# Patient Record
Sex: MALE | Race: WHITE | NOT HISPANIC OR LATINO | Employment: OTHER | URBAN - METROPOLITAN AREA
[De-identification: names, ages, dates, MRNs, and addresses within clinical notes are randomized per-mention and may not be internally consistent; named-entity substitution may affect disease eponyms.]

---

## 2017-01-18 ENCOUNTER — APPOINTMENT (OUTPATIENT)
Dept: SLEEP MEDICINE | Facility: MEDICAL CENTER | Age: 72
End: 2017-01-18
Attending: NURSE PRACTITIONER
Payer: MEDICARE

## 2017-02-01 ENCOUNTER — APPOINTMENT (OUTPATIENT)
Dept: SLEEP MEDICINE | Facility: MEDICAL CENTER | Age: 72
End: 2017-02-01
Payer: MEDICARE

## 2017-10-23 ENCOUNTER — TELEPHONE (OUTPATIENT)
Dept: PULMONOLOGY | Facility: HOSPICE | Age: 72
End: 2017-10-23

## 2017-10-23 NOTE — TELEPHONE ENCOUNTER
Samantha the pt's wife called wanting to know if we had received something from the dentist for the pt's dental appliance.  I informed that nothing had been received yet and gave her the fax number to have info sent over.

## 2017-11-01 NOTE — TELEPHONE ENCOUNTER
Pt notified on vm that [facility] was [facility] and for them to cb.

## 2018-01-31 ENCOUNTER — OFFICE VISIT (OUTPATIENT)
Dept: URGENT CARE | Facility: PHYSICIAN GROUP | Age: 73
End: 2018-01-31
Payer: MEDICARE

## 2018-01-31 VITALS
BODY MASS INDEX: 23.46 KG/M2 | HEIGHT: 66 IN | HEART RATE: 71 BPM | WEIGHT: 146 LBS | SYSTOLIC BLOOD PRESSURE: 122 MMHG | OXYGEN SATURATION: 94 % | DIASTOLIC BLOOD PRESSURE: 78 MMHG | RESPIRATION RATE: 15 BRPM | TEMPERATURE: 99 F

## 2018-01-31 DIAGNOSIS — J22 LRTI (LOWER RESPIRATORY TRACT INFECTION): ICD-10-CM

## 2018-01-31 PROCEDURE — 99203 OFFICE O/P NEW LOW 30 MIN: CPT | Performed by: FAMILY MEDICINE

## 2018-01-31 RX ORDER — GUAIFENESIN 600 MG/1
600 TABLET, EXTENDED RELEASE ORAL PRN
COMMUNITY
End: 2022-08-15

## 2018-01-31 RX ORDER — AZITHROMYCIN 250 MG/1
TABLET, FILM COATED ORAL
Qty: 6 TAB | Refills: 0 | Status: SHIPPED | OUTPATIENT
Start: 2018-01-31 | End: 2020-09-01

## 2018-01-31 ASSESSMENT — ENCOUNTER SYMPTOMS
SHORTNESS OF BREATH: 1
COUGH: 1
WHEEZING: 1
FEVER: 0

## 2018-02-01 NOTE — PROGRESS NOTES
Subjective:     Alex Montemayor is a 72 y.o. male who presents for Cough (Chest congestion, body aches, sore thraot x 3 weeks)       Cough   This is a new problem. The current episode started 1 to 4 weeks ago. The problem has been gradually worsening. The problem occurs every few minutes. The cough is productive of sputum. Associated symptoms include shortness of breath and wheezing. Pertinent negatives include no fever, nasal congestion or postnasal drip.     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 at [facility]   • FORAMINOTOMY  12/2/2013    Performed by Aleksey Garcia M.D. at [facility]   • PB INJ,FORAMEN,L/S,1 LEVEL  10/30/2013    Performed by Demond Sullivan M.D. at [facility]   • PB INJ,FORAMEN,L/S,ADDL LEVELS  10/30/2013    Performed by Demond Sullivan M.D. at [facility]   • CAROTID ENDARTERECTOMY  7/24/2013    Performed by Collins Conroy M.D. at [facility]   • [facility]  [facility]23/2012    [facility]   • OTHER      pacemaker   • TONSILLECTOMY       Social History     Social History   • Marital status:   "    Spouse name: N/A   • Number of children: N/A   • Years of education: N/A     Occupational History   • Not on file.     Social History Main Topics   • Smoking status: Former Smoker     Packs/day: 3.00     Years: 6.00     Types: Cigarettes     Quit date: 1/1/1967   • Smokeless tobacco: Never Used   • Alcohol use 0.0 oz/week      Comment: RARE 1 PER MONTH   • Drug use: No   • Sexual activity: Not on file     Other Topics Concern   • Not on file     Social History Narrative   • No narrative on file      Family History   Problem Relation Age of Onset   • Other Mother      wegeners granulomatosis   • Other Father      parkinsons,dementia    Review of Systems   Constitutional: Negative for fever.   HENT: Negative for postnasal drip.    Respiratory: Positive for cough, shortness of breath and wheezing.      Allergies   Allergen Reactions   • Serzone [Nefazodone Hydrochloride] Anaphylaxis   • Ibuprofen Hives     Chest pains if taken long term   • Nsaids Hives     Chest pain, if taken long term   • Other Misc Rash     Adhesive tape,  3-0 vicryl, 4-0 monocryl      Objective:   /78   Pulse 71   Temp 37.2 °C (99 °F)   Resp 15   Ht 1.676 m (5' 6\")   Wt 66.2 kg (146 lb)   SpO2 94%   BMI 23.57 kg/m²   Physical Exam   Constitutional: He is oriented to person, place, and time. He appears well-developed and well-nourished. No distress.   HENT:   Head: Normocephalic and atraumatic.   Eyes: Conjunctivae and EOM are normal. Pupils are equal, round, and reactive to light.   Cardiovascular: Normal rate and regular rhythm.    No murmur heard.  Pulmonary/Chest: Effort normal. No respiratory distress. He has wheezes. He has no rales.   Abdominal: Soft. He exhibits no distension. There is no tenderness.   Neurological: He is alert and oriented to person, place, and time. He has normal reflexes. No sensory deficit.   Skin: Skin is warm and dry.   Psychiatric: He has a normal mood and affect.         Assessment/Plan: "   Assessment    1. LRTI (lower respiratory tract infection)  - azithromycin (ZITHROMAX) 250 MG Tab; Take 2 tablets by mouth on day one. Take one tablet by mouth the remaining days until gone  Dispense: 6 Tab; Refill: 0  Differential diagnosis, natural history, supportive care, and indications for immediate follow-up discussed.

## 2020-09-01 ENCOUNTER — OFFICE VISIT (OUTPATIENT)
Dept: NEUROLOGY | Facility: MEDICAL CENTER | Age: 75
End: 2020-09-01
Payer: MEDICARE

## 2020-09-01 VITALS
DIASTOLIC BLOOD PRESSURE: 60 MMHG | RESPIRATION RATE: 16 BRPM | HEART RATE: 80 BPM | WEIGHT: 142 LBS | OXYGEN SATURATION: 96 % | SYSTOLIC BLOOD PRESSURE: 96 MMHG | TEMPERATURE: 98.2 F | BODY MASS INDEX: 22.82 KG/M2 | HEIGHT: 66 IN

## 2020-09-01 DIAGNOSIS — G47.33 OBSTRUCTIVE SLEEP APNEA: ICD-10-CM

## 2020-09-01 DIAGNOSIS — R41.3 MEMORY LOSS: ICD-10-CM

## 2020-09-01 DIAGNOSIS — F41.9 ANXIETY: ICD-10-CM

## 2020-09-01 DIAGNOSIS — Z86.73 HISTORY OF STROKE: ICD-10-CM

## 2020-09-01 DIAGNOSIS — F32.A DEPRESSION, UNSPECIFIED DEPRESSION TYPE: ICD-10-CM

## 2020-09-01 DIAGNOSIS — Z87.898 HISTORY OF SEIZURE: ICD-10-CM

## 2020-09-01 PROCEDURE — 99205 OFFICE O/P NEW HI 60 MIN: CPT | Performed by: PSYCHIATRY & NEUROLOGY

## 2020-09-01 ASSESSMENT — ENCOUNTER SYMPTOMS
ABDOMINAL PAIN: 0
FALLS: 0
SHORTNESS OF BREATH: 0
WEIGHT LOSS: 0
SORE THROAT: 0
HALLUCINATIONS: 0
BRUISES/BLEEDS EASILY: 0
FEVER: 0
EYE DISCHARGE: 0

## 2020-09-01 ASSESSMENT — PATIENT HEALTH QUESTIONNAIRE - PHQ9
CLINICAL INTERPRETATION OF PHQ2 SCORE: 1
SUM OF ALL RESPONSES TO PHQ QUESTIONS 1-9: 6
5. POOR APPETITE OR OVEREATING: 0 - NOT AT ALL
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
## 2020-09-04 ENCOUNTER — TELEPHONE (OUTPATIENT)
Dept: NEUROLOGY | Facility: MEDICAL CENTER | Age: 75
End: 2020-09-04

## 2020-09-04 NOTE — TELEPHONE ENCOUNTER
After I got a fax saying no medical record at [facility] I called the pt and spoke with his wife teddy . She gave me a phone  and fax number for Augusto's PCP . So I called and spoke with assistant to send us the test results gave him our fax number. And I faxed a request to his pcp office.  The pt asked us the referrals for behavioral health and psychiatry to be moved to [facility] from [facility]. I contacted gudelia chapman and she moved the referrals to [facility] and I informed the pt . He commincated understanding.

## 2020-09-09 ENCOUNTER — DOCUMENTATION (OUTPATIENT)
Dept: NEUROLOGY | Facility: MEDICAL CENTER | Age: 75
End: 2020-09-09

## 2020-09-09 NOTE — PROGRESS NOTES
Labs received dated August 2020.    Folate, B6 12, TSH normal  Unremarkable CBC and CMP  The panel with triglycerides 54 LDL 96

## 2020-09-17 ENCOUNTER — DOCUMENTATION (OUTPATIENT)
Dept: NEUROLOGY | Facility: MEDICAL CENTER | Age: 75
End: 2020-09-17

## 2020-09-17 NOTE — PROGRESS NOTES
Records received.    MRI brain with without contrast 2001.  -Incidental vascular lesion in the left subinsular region suspicion for venous angioma  -Sinus disease    MRI brain with and without contrast dated April 2008.  -Few scattered nonspecific white matter change  -Mild sinus disease    MRI?  Brain 1991  -No cerebral aneurysm  -Nonspecific white matter change    1996 EEG interpreted by Dr. West: Normal    Neuropsych testing?  Incomplete report.   Dr. Logan, PhD/ELISA    No change to plan or impression

## 2020-10-02 ENCOUNTER — OFFICE VISIT (OUTPATIENT)
Dept: BEHAVIORAL HEALTH | Facility: CLINIC | Age: 75
End: 2020-10-02
Payer: MEDICARE

## 2020-10-02 DIAGNOSIS — G47.00 INSOMNIA, UNSPECIFIED TYPE: ICD-10-CM

## 2020-10-02 DIAGNOSIS — F41.1 GENERALIZED ANXIETY DISORDER: ICD-10-CM

## 2020-10-02 DIAGNOSIS — F33.1 MODERATE RECURRENT MAJOR DEPRESSION (HCC): ICD-10-CM

## 2020-10-02 PROCEDURE — 99213 OFFICE O/P EST LOW 20 MIN: CPT | Performed by: PSYCHIATRY & NEUROLOGY

## 2020-10-02 RX ORDER — ZOLPIDEM TARTRATE 10 MG/1
10 TABLET ORAL NIGHTLY PRN
Qty: 30 TAB | Refills: 2 | Status: SHIPPED | OUTPATIENT
Start: 2020-10-02 | End: 2020-11-01

## 2020-10-02 RX ORDER — ESCITALOPRAM OXALATE 20 MG/1
TABLET ORAL
Qty: 30 TAB | Refills: 2 | Status: SHIPPED | OUTPATIENT
Start: 2020-10-02 | End: 2020-12-02 | Stop reason: SDUPTHER

## 2020-10-02 ASSESSMENT — ANXIETY QUESTIONNAIRES
4. TROUBLE RELAXING: SEVERAL DAYS
1. FEELING NERVOUS, ANXIOUS, OR ON EDGE: SEVERAL DAYS
3. WORRYING TOO MUCH ABOUT DIFFERENT THINGS: SEVERAL DAYS
GAD7 TOTAL SCORE: 7
2. NOT BEING ABLE TO STOP OR CONTROL WORRYING: SEVERAL DAYS
5. BEING SO RESTLESS THAT IT IS HARD TO SIT STILL: SEVERAL DAYS
7. FEELING AFRAID AS IF SOMETHING AWFUL MIGHT HAPPEN: SEVERAL DAYS
6. BECOMING EASILY ANNOYED OR IRRITABLE: SEVERAL DAYS

## 2020-10-02 ASSESSMENT — PATIENT HEALTH QUESTIONNAIRE - PHQ9
5. POOR APPETITE OR OVEREATING: 0 - NOT AT ALL
CLINICAL INTERPRETATION OF PHQ2 SCORE: 5
SUM OF ALL RESPONSES TO PHQ QUESTIONS 1-9: 13

## 2020-10-02 NOTE — PROGRESS NOTES
" [facility] INITIAL PSYCHIATRIC EVALUATION    This provider informed the patient their medical records are totally confidential except for the use by other providers involved in their care, or if the patient signs a release, or to report instances of child or elder abuse, or if it is determined they are an immediate risk to harm themselves or others.    TOTAL FACE-TO-FACE TIME 60 minutes    CHIEF COMPLAINT       Having depressed mood, generalized anxiety, and neurocognitive disorder.    IDENTIFYING INFORMATION       The patient is a  75yo W male who retired in 2009 from a job as a .    HISTORY OF PRESENT ILLNESS       The patient was referred by his neurologist, Dr. 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  He has fought depression since [place].  His depressive episodes have a typical pattern of anhedonia, hypersomnia and increased appetite, markedly decreased energy, concentration, interest, and motivation, feeling worthless and hopeless, and having passive suicidal thoughts \"that people might be better off without him\", but he denies ever having an overt suicidal plan, intent, or attempt.  He had serious depressions in 1990, 1996, and 2001.         He also has had Generalized Anxiety Disorder with excessive worry and concern that he cannot get out of his mind, restlessness, irritability, difficulty relaxing, and muscle tension in his neck, shoulders, and upper back.       He has had serious medical problems including having a (L) carotid endarterectomy after a TIA, having a cardiac pacemaker, and having obstructive sleep apnea.  He was treated for seizure disorder in the past.        Today he scores a 30/30 on a mini mental status exam and has no evidence of serious cognitive impairment.  However, he at times is thinking slower and he " may have pseudodementia associated with his depressed mood.       His father  in  from Parkinson's Disease and the patient was his major caretaker.  His 38yo daughter who has bipolar disorder has been living with the patient and his wife for the last 2 years.    PSYCHIATRIC REVIEW OF SYSTEMS  denies manic symptoms, denies psychotic symptoms including AH / VH, denies OCD symptoms, denies restrictive eating or purging, denies trauma related symptoms, see HPI for depressive symptoms and see HPI for anxeity symptoms    MEDICAL REVIEW OF SYSTEMS:   Constitutional negative   Eyes negative   Ears/Nose/Mouth/Throat positive - cervical spondylosis, (L) carotid endarterectomy, sinusitis   Cardiovascular positive - cardiac pacemaker   Respiratory positive - obtsructive sleep apnea with CPAP   Gastrointestinal negative   Genitourinary negative   Muscular negative   Integumentary negative   Neurological positive - seizure disorder   Endocrine negative   Hematologic/Lymphatic negative       PAST PSYCHIATRIC HISTORY       Recurrent major Depression and Generalized Anxiety Disorder.  He denies ever having OCD, manic or hypomanic episodes.  PAST PSYCHIATRIC MEDICATIONS       Serzone, buspirone (hallucinations, sertraline, paroxetine, fluoxetine (spacy, lightheaded)  SOCIAL HISTORY       Born and raised in [place], [place] and graduated from college.  He worked for the [employer] company 35 years.  DRUGS none  ALCOHOL occasional alcohol  TOBACCO former smoker    MEDICAL HISTORY       Cervical spondylosis, sinusitis, (L) carotid endarterectomy, cardiac pacemaker, obstructive sleep apnea with CPAP, TIA, and seizure disorder with possible petit mal.  CURRENT MEDICATIONS       See Rooming section of this medical record for current medications.  ALLERGIES       Serzone, NSAIDS, ibuprofen  MENTAL STATUS EXAMINATION    There were no vitals taken for this visit.  Participation: Active verbal participation  Grooming:Neat  Orientation: Fully  Oriented  Eye contact: Good  Behavior:Calm   Mood: Depressed  Affect: Full range  Thought process: Logical  Thought content:  Within normal limits  Speech: Rate within normal limits and Volume within normal limits  Perception:  Within normal limits  Memory:  No gross evidence of memory deficits  Insight: Good  Judgment: Good  Family/couple interaction observations:   Other:  Depression Screen (PHQ-2/PHQ-9) 9/1/2020 10/2/2020   PHQ-2 Total Score 1 5   PHQ-9 Total Score 6 13       Interpretation of PHQ-9 Total Score   Score Severity   1-4 No Depression   5-9 Mild Depression   10-14 Moderate Depression   15-19 Moderately Severe Depression   20-27 Severe Depression  CURRENT RISK       Suicidal:Low       Homicidal:Not applicable       Self-Harm:Low       Relapse:Moderate       Crisis Safety Plan Reviewed No    ASSESSMENT       Having generalized anxiety and depression and he will be started on escitalopram   DIFFERENTIAL DIAGNOSES       (1) Major Depression, Recurrent, Moderate (F33.1)       (2) Generalized Anxiety Disorder (F41.1)  PLAN       Start escitalopram 10mg po daily X 1 week, then increase the dosage to 20mg po daily thereafter.       RTC to  Clinic in 2 months for 30 min follow up with this provider.        Patient was seen for 60 minutes face to face of which > 50% of appointment time was spent on counseling and coordination of care regarding the above.

## 2020-10-07 ENCOUNTER — APPOINTMENT (OUTPATIENT)
Dept: BEHAVIORAL HEALTH | Facility: CLINIC | Age: 75
End: 2020-10-07
Payer: MEDICARE

## 2020-12-02 ENCOUNTER — OFFICE VISIT (OUTPATIENT)
Dept: BEHAVIORAL HEALTH | Facility: CLINIC | Age: 75
End: 2020-12-02
Payer: MEDICARE

## 2020-12-02 DIAGNOSIS — F41.1 GENERALIZED ANXIETY DISORDER: ICD-10-CM

## 2020-12-02 DIAGNOSIS — F33.1 MODERATE RECURRENT MAJOR DEPRESSION (HCC): ICD-10-CM

## 2020-12-02 PROCEDURE — 90833 PSYTX W PT W E/M 30 MIN: CPT | Performed by: PSYCHIATRY & NEUROLOGY

## 2020-12-02 PROCEDURE — 99213 OFFICE O/P EST LOW 20 MIN: CPT | Performed by: PSYCHIATRY & NEUROLOGY

## 2020-12-02 RX ORDER — ESCITALOPRAM OXALATE 20 MG/1
20 TABLET ORAL DAILY
Qty: 90 TAB | Refills: 0 | Status: SHIPPED | OUTPATIENT
Start: 2020-12-02 | End: 2021-03-02

## 2020-12-02 NOTE — PROGRESS NOTES
"                                  [facility] PSYCHIATRIC FOLLOW-UP NOTE    This provider informed the patient their medical records are totally confidential except for the use by other providers involved in their care, or if the patient signs a release, or to report instances of child or elder abuse, or if it is determined they are an immediate risk to harm themselves or others.    TOTAL FACE-TO-FACE TIME  30 minutes    CHIEF COMPLAINT       Having depression, generalized anxiety, and neurocognitive disorder.  HISTORY OF PRESENT ILLNESS       The patient is a  76yo W male who is followed by this provider for recurrent Major Depression, Generalized Anxiety Disorder, and Pseudodementia.  He has had recurrent depressive episodes since high school with a typical pattern of anhedonia, decreased sleep and appetite, feeling worthless and hopeless, having moderate psychomotor retardation, and having passive suicidal ideation \"that people might be better off without him\".  He denies ever having an overt suicidal plan, intent, or attempt.  He has serious depressions in , , and .            He also has had Generalized Anxiety Disorder with excessive worry and concern that he cannot get out of his mind, restlessness, irritability, difficulty relaxing, and muscle tension in his neck, shoulders, and upper back.       He has had serious medical problems including having a (L) carotid endarterectomy after a TIA, having a cardiac pacemaker, and having obstructive sleep apnea.  He was treated for seizure disorder in the past.             His depression has been associated with some pseudodementia and he will think slower and have decreased recent memory.       His father  in  from Parkinson's Disease and the patient was his major caretaker.  His 40yo daughter who has bipolar disorder has been living with the patient and his wife for the last 2 years.  PSYCHOSOCIAL CHANGES SINCE PREVIOUS " CONTACT       The patient is maintaining social distancing but is very active at home and exercises by taking walking.  RESPONSE TO TREATMENT       The patient is more calm and has alleviation of anxiety on escitalopram 20mg po daily.  PAST PSYCHIATRIC MEDICATIONS       Serzone, buspirone (hallucinations), sertraline, paroxetine, fluoxetine (spacy, lightheaded)  MEDICATION SIDE EFFECTS       None    MEDICAL REVIEW OF SYSTEMS:   Constitutional negative   Eyes negative   Ears/Nose/Mouth/Throat positive - cervical spondylosis, (L) carotid endarterectomy, sinusitis   Cardiovascular positive - cardiac pacemaker   Respiratory positive - obtsructive sleep apnea with CPAP   Gastrointestinal negative   Genitourinary negative   Muscular negative   Integumentary negative   Neurological positive - seizure disorder   Endocrine negative   Hematologic/Lymphatic negative       MENTAL STATUS EVALUATION  There were no vitals taken for this visit.  Participation: Active verbal participation  Grooming:Neat  Orientation: Fully Oriented  Eye contact: Good  Behavior:Calm   Mood: Euthymic  Affect: Full range  Thought process: Logical  Thought content:  Within normal limits  Speech: Rate within normal limits and Volume within normal limits  Perception:  Within normal limits  Memory:  No gross evidence of memory deficits  Insight: Good  Judgment: Good  Family/couple interaction observations:   Other:  Depression Screen (PHQ-2/PHQ-9) 9/1/2020 10/2/2020   PHQ-2 Total Score 1 5   PHQ-9 Total Score 6 13       Interpretation of PHQ-9 Total Score   Score Severity   1-4 No Depression   5-9 Mild Depression   10-14 Moderate Depression   15-19 Moderately Severe Depression   20-27 Severe Depression  Current risk:    Suicide: Low   Homicide: Not applicable   Self-harm: Low  Relapse: Moderate  Other:   Crisis Safety Plan reviewed?No  If evidence of imminent risk is present, intervention/plan:    Medical Records/Labs/Diagnostic Tests Reviewed:  yes    Medical Records/Labs/Diagnostic Tests Ordered: no    DIAGNOSTIC IMPRESSIONS       (1) Major Depression, Recurrent, Moderate (F33.1)       (2) Generalized Anxiety Disorder (F41.1)    ASSESSMENT AND PLAN       Having alleviation of anxiety and depression on escitalopram 20mg po daily.       Continue escitalopram 20mg po daily.       RTC to  Clinic in 2 months for 30 min follow up with this provider.    30 minutes spent in psychotherapy  Topics addressed in psychotherapy include:  Discussed how depression can cause changes in cognition.  Explored external stressors and patient's support system.  Discussed how to restore a sense of meaning in his life.

## 2021-01-14 DIAGNOSIS — Z23 NEED FOR VACCINATION: ICD-10-CM

## 2021-01-22 ENCOUNTER — TELEPHONE (OUTPATIENT)
Dept: NEUROLOGY | Facility: MEDICAL CENTER | Age: 76
End: 2021-01-22

## 2021-01-22 ENCOUNTER — HOSPITAL ENCOUNTER (OUTPATIENT)
Dept: RADIOLOGY | Facility: MEDICAL CENTER | Age: 76
End: 2021-01-22
Attending: PSYCHIATRY & NEUROLOGY
Payer: MEDICARE

## 2021-01-22 DIAGNOSIS — R41.3 MEMORY LOSS: ICD-10-CM

## 2021-01-22 PROCEDURE — 70450 CT HEAD/BRAIN W/O DYE: CPT

## 2021-01-22 NOTE — TELEPHONE ENCOUNTER
----- Message from Tran Arevalo M.D. sent at 1/22/2021  1:35 PM PST -----  NO acute findings. Suspect underlying white matter disease. Will have Dipesh communicate to patient.

## 2021-01-23 NOTE — TELEPHONE ENCOUNTER
I called the pt and informed him the above. He communicated understanding. He asked the report to be mailed to him and I mailed it to his address

## 2021-03-02 ENCOUNTER — APPOINTMENT (OUTPATIENT)
Dept: BEHAVIORAL HEALTH | Facility: CLINIC | Age: 76
End: 2021-03-02
Payer: MEDICARE

## 2021-03-22 DIAGNOSIS — F51.01 PRIMARY INSOMNIA: ICD-10-CM

## 2021-03-22 RX ORDER — ZOLPIDEM TARTRATE 10 MG/1
10 TABLET ORAL NIGHTLY PRN
Qty: 30 TABLET | Refills: 2 | Status: SHIPPED | OUTPATIENT
Start: 2021-03-22 | End: 2021-04-21

## 2021-03-22 RX ORDER — ESCITALOPRAM OXALATE 20 MG/1
20 TABLET ORAL DAILY
Qty: 90 TABLET | Refills: 0 | Status: SHIPPED | OUTPATIENT
Start: 2021-03-22 | End: 2021-05-28 | Stop reason: SDUPTHER

## 2021-03-22 RX ORDER — ESCITALOPRAM OXALATE 20 MG/1
20 TABLET ORAL DAILY
Qty: 90 TABLET | Refills: 0 | Status: SHIPPED | OUTPATIENT
Start: 2021-03-22 | End: 2021-03-22 | Stop reason: SDUPTHER

## 2021-05-27 NOTE — PROGRESS NOTES
"                                  [facility] PSYCHIATRIC FOLLOW-UP NOTE    This provider informed the patient their medical records are totally confidential except for the use by other providers involved in their care, or if the patient signs a release, or to report instances of child or elder abuse, or if it is determined they are an immediate risk to harm themselves or others.  To avoid spread of covid-19 virus this appointment was conducted by telehealth.  The patient gave consent to have this follow up session by video telehealth.    Time at beginning of call:  11:00 AM    TOTAL FACE-TO-FACE TIME  30 minutes    CHIEF COMPLAINT       Having depression, generalized anxiety, and neurocognitive disorder.  HISTORY OF PRESENT ILLNESS       The patient is a  76yo W male who is followed by this provider for recurrent Major Depression, Generalized Anxiety Disorder, and Pseudodementia.  He has had recurrent depressive episodes since high school with a typical pattern of anhedonia, decreased sleep and appetite, feeling worthless and hopeless, having moderate psychomotor retardation, and having passive suicidal ideation \"that people might be better off without him\".  He denies ever having an overt suicidal plan, intent, or attempt.  He has serious depressions in 1990, 1996, and 2001.       He also has had Generalized Anxiety Disorder with excessive worry and concern that he cannot get out of his mind, restlessness, irritability, difficulty relaxing, and muscle tension in his neck, shoulders, and upper back.       He has had serious medical problems including having a (L) carotid endarterectomy after a TIA, having a cardiac pacemaker, and having obstructive sleep apnea.  He was treated for seizure disorder in the past.             His depression has been associated with some pseudodementia and he will think slower and have decreased recent memory.  However, his wife has noticed he has difficulty using " the TV remote, he has difficulty with word finding, and he loses track of directions when driving to places he has been familiar before.         His father  in  from Parkinson's Disease and the patient was his major caretaker.  His 40yo daughter who has bipolar disorder has been living with the patient and his wife for the last 2 years.       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  PSYCHOSOCIAL CHANGES SINCE PREVIOUS CONTACT       His spouse is getting frustrated and wants him to be on medications for dementing change.  RESPONSE TO TREATMENT       Having progression of his dementing change.  PAST PSYCHIATRIC MEDICATIONS       Serzone, buspirone (hallucinations), sertraline, paroxetine, fluoxetine (spacy, lightheaded)  MEDICATION SIDE EFFECTS       None    MEDICAL REVIEW OF SYSTEMS:   Constitutional negative   Eyes negative   Ears/Nose/Mouth/Throat positive - cervical spondylosis, (L) carotid endarterectomy, sinusitis   Cardiovascular positive - cardiac pacemaker   Respiratory positive - obtsructive sleep apnea with CPAP   Gastrointestinal negative   Genitourinary negative   Muscular negative   Integumentary negative   Neurological positive - seizure disorder   Endocrine negative   Hematologic/Lymphatic negative           MENTAL STATUS EVALUATION  There were no vitals taken for this visit.  Participation: Active verbal participation  Grooming:Neat  Orientation: Fully Oriented  Eye contact: Good  Behavior:Calm   Mood: Euthymic  Affect: Full range  Thought process: Logical  Thought content:  Within normal limits  Speech: Rate within normal limits and Volume within normal limits  Perception:  Within normal limits  Memory:  No gross evidence of memory deficits  Insight: Good  Judgment: Good  Family/couple interaction observations:   Other:  Depression Screen (PHQ-2/PHQ-9) 2020 10/2/2020   PHQ-2 Total Score 1 5  "  PHQ-9 Total Score 6 13       Interpretation of PHQ-9 Total Score   Score Severity   1-4 No Depression   5-9 Mild Depression   10-14 Moderate Depression   15-19 Moderately Severe Depression   20-27 Severe Depression  Current risk:    Suicide: Low   Homicide: Not applicable   Self-harm: Low  Relapse: Moderate  Other:   Crisis Safety Plan reviewed?No  If evidence of imminent risk is present, intervention/plan:    Medical Records/Labs/Diagnostic Tests Reviewed: yes    Medical Records/Labs/Diagnostic Tests Ordered: no    DIAGNOSTIC IMPRESSIONS  DIAGNOSTIC IMPRESSIONS       (1) Major Depression, Recurrent, Moderate (F33.1)       (2) Generalized Anxiety Disorder (F41.1)       (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  However, his dementing change is progressing and he has recent memory loss.  He will be started on donepezil 5mg po QHS and memantine 10mg po BID.       Continue escitalopram 20mg po daily.       Start donepezil 5mg po QHS.       Start memantine 10mg po BID.       RTC to  Clinic in 2 months for 30 min follow up with this provider.    Time at end of call:  11:30 AM    30 minutes spent in psychotherapy  Topics addressed in psychotherapy include:  His cognition and recent memory is improving as he becomes less depressed.  The patient shared his fears because of seeing his father immobilized by Parkinson\"s Disease.  He was reassured that his cognitive impaired was part of having severe depression and reversible.          "

## 2021-05-28 ENCOUNTER — TELEMEDICINE (OUTPATIENT)
Dept: BEHAVIORAL HEALTH | Facility: CLINIC | Age: 76
End: 2021-05-28
Payer: MEDICARE

## 2021-05-28 DIAGNOSIS — F41.1 GENERALIZED ANXIETY DISORDER: ICD-10-CM

## 2021-05-28 DIAGNOSIS — F33.1 MODERATE RECURRENT MAJOR DEPRESSION (HCC): ICD-10-CM

## 2021-05-28 DIAGNOSIS — F03.90: ICD-10-CM

## 2021-05-28 PROCEDURE — 90833 PSYTX W PT W E/M 30 MIN: CPT | Mod: 95,CR | Performed by: PSYCHIATRY & NEUROLOGY

## 2021-05-28 PROCEDURE — 99213 OFFICE O/P EST LOW 20 MIN: CPT | Mod: 95,CR | Performed by: PSYCHIATRY & NEUROLOGY

## 2021-05-28 RX ORDER — ESCITALOPRAM OXALATE 20 MG/1
20 TABLET ORAL DAILY
Qty: 90 TABLET | Refills: 0 | Status: SHIPPED | OUTPATIENT
Start: 2021-05-28 | End: 2021-08-26

## 2021-05-28 RX ORDER — DONEPEZIL HYDROCHLORIDE 5 MG/1
5 TABLET, FILM COATED ORAL
Qty: 90 TABLET | Refills: 0 | Status: SHIPPED | OUTPATIENT
Start: 2021-05-28 | End: 2021-06-28

## 2021-05-28 RX ORDER — MEMANTINE HYDROCHLORIDE 10 MG/1
10 TABLET ORAL 2 TIMES DAILY
Qty: 180 TABLET | Refills: 0 | Status: SHIPPED | OUTPATIENT
Start: 2021-05-28 | End: 2021-06-28

## 2021-06-28 ENCOUNTER — OFFICE VISIT (OUTPATIENT)
Dept: NEUROLOGY | Facility: MEDICAL CENTER | Age: 76
End: 2021-06-28
Attending: PSYCHIATRY & NEUROLOGY
Payer: MEDICARE

## 2021-06-28 VITALS
SYSTOLIC BLOOD PRESSURE: 120 MMHG | TEMPERATURE: 98.4 F | HEART RATE: 68 BPM | WEIGHT: 149.25 LBS | BODY MASS INDEX: 23.99 KG/M2 | RESPIRATION RATE: 20 BRPM | HEIGHT: 66 IN | DIASTOLIC BLOOD PRESSURE: 74 MMHG | OXYGEN SATURATION: 96 %

## 2021-06-28 DIAGNOSIS — R41.3 MEMORY LOSS: ICD-10-CM

## 2021-06-28 DIAGNOSIS — Z12.11 COLON CANCER SCREENING: ICD-10-CM

## 2021-06-28 DIAGNOSIS — F32.A DEPRESSION, UNSPECIFIED DEPRESSION TYPE: ICD-10-CM

## 2021-06-28 PROCEDURE — 99214 OFFICE O/P EST MOD 30 MIN: CPT | Performed by: PSYCHIATRY & NEUROLOGY

## 2021-06-28 PROCEDURE — 99202 OFFICE O/P NEW SF 15 MIN: CPT | Performed by: PSYCHIATRY & NEUROLOGY

## 2021-06-28 RX ORDER — ZOLPIDEM TARTRATE 10 MG/1
TABLET ORAL
COMMUNITY
End: 2021-09-16 | Stop reason: SDUPTHER

## 2021-06-28 RX ORDER — OMEPRAZOLE 40 MG/1
CAPSULE, DELAYED RELEASE ORAL DAILY
COMMUNITY
Start: 2021-05-20

## 2021-06-28 ASSESSMENT — ENCOUNTER SYMPTOMS: HALLUCINATIONS: 0
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
## 2021-07-22 ENCOUNTER — PRE-ADMISSION TESTING (OUTPATIENT)
Dept: ADMISSIONS | Facility: MEDICAL CENTER | Age: 76
End: 2021-07-22
Attending: INTERNAL MEDICINE
Payer: MEDICARE

## 2021-07-26 NOTE — OR NURSING
COVID-19 Pre-surgery screenin. Do you have an undiagnosed respiratory illness or symptoms such as coughing or sneezing? NO (Yes/No)  a. Onset of Sx -  b. Acute vs. chronic respiratory illness -     2. Do you have an unexplained fever greater than 100.4 degrees Fahrenheit or 38 degrees Celsius?                     NO (Yes/No)     3. Have you had direct exposure to a patient who tested positive for Covid-19?                          NO (Yes/No)     4. Have you had any loss of your sense of taste or smell? Have you had N/V or sore throat? NO     Patient has been informed of visitor policy and asked to wear a mask upon entering the hospital   YES (Yes/No)            COVID-19 Pre-surgery screening:     Pt did not answer generic voicemail was left with call back number.

## 2021-07-27 ENCOUNTER — HOSPITAL ENCOUNTER (OUTPATIENT)
Facility: MEDICAL CENTER | Age: 76
End: 2021-07-27
Attending: INTERNAL MEDICINE | Admitting: INTERNAL MEDICINE
Payer: MEDICARE

## 2021-07-27 VITALS
HEIGHT: 65 IN | DIASTOLIC BLOOD PRESSURE: 69 MMHG | OXYGEN SATURATION: 96 % | RESPIRATION RATE: 16 BRPM | BODY MASS INDEX: 24.72 KG/M2 | WEIGHT: 148.37 LBS | TEMPERATURE: 98.2 F | SYSTOLIC BLOOD PRESSURE: 129 MMHG | HEART RATE: 67 BPM

## 2021-07-27 PROCEDURE — 160048 HCHG OR STATISTICAL LEVEL 1-5: Performed by: INTERNAL MEDICINE

## 2021-07-27 PROCEDURE — 700101 HCHG RX REV CODE 250

## 2021-07-27 PROCEDURE — 160035 HCHG PACU - 1ST 60 MINS PHASE I: Performed by: INTERNAL MEDICINE

## 2021-07-27 PROCEDURE — 160200: Performed by: INTERNAL MEDICINE

## 2021-07-27 PROCEDURE — 91010 ESOPHAGUS MOTILITY STUDY: CPT | Performed by: INTERNAL MEDICINE

## 2021-07-27 RX ORDER — LIDOCAINE HYDROCHLORIDE 20 MG/ML
SOLUTION OROPHARYNGEAL
Status: DISCONTINUED
Start: 2021-07-27 | End: 2021-07-27 | Stop reason: HOSPADM

## 2021-07-27 RX ORDER — LIDOCAINE HYDROCHLORIDE 20 MG/ML
JELLY TOPICAL
Status: DISCONTINUED
Start: 2021-07-27 | End: 2021-07-27 | Stop reason: HOSPADM

## 2021-07-27 NOTE — DISCHARGE INSTRUCTIONS
Esophageal Manometry- Discharge Instructions     Esophageal Manometry  1. Resume regular diet and medications.  2. Follow up with your doctor.  3. Additional instructions:     You should call 911 if you develop problems with breathing or chest pain. If any questions arise, call your doctor. The Contact Center is open Monday through Friday 7AM to 5PM and may speak to a nurse at [phone], or toll free at [phone].    I acknowledge receipt and understanding of these Home Care Instructions.

## 2021-07-27 NOTE — PROGRESS NOTES
Patient consented for the procedure, as RN proceeded with placing the esophageal manometry probe to right nostril, patient continuously starting coughing, gaging. Waited for couple minutes to proceed but patient started vomiting and stated that his neck is hurting now. Patient stated to take the probe out now and wished not to continue with this procedure. Explained that this is normal as the beads from probe does irritate the back of his throat which causes the gagging and vomiting, but patient refused. Gave patient more water to drink and suggested to proceed with left nostril. Patient refused to proceed with this procedure. Informed Dr Ley office and spoke with Carisa () about patient refusal for this procedure. Informed patient's wife as well about this. Patient signed his discharge paperwork and left the building with his wife.

## 2021-07-27 NOTE — PROGRESS NOTES
Risk and benefits reviewed with patient including the following:  Esophageal manometry is generally safe, though complications including nose bleeds, and nose and/or throat discomfort may occur.  Acquired data may be inaccurate due to the inability to place the manometry tube in the correct position.  Potential serious risks which include irregular heartbeat, aspiration, or perforation are extremely rare.  Your physician believes these potential risks are outweighed by the benefits of this test in your case.  Patient agrees and wishes to continue with test.

## 2021-09-13 ENCOUNTER — TELEMEDICINE (OUTPATIENT)
Dept: BEHAVIORAL HEALTH | Facility: CLINIC | Age: 76
End: 2021-09-13
Payer: MEDICARE

## 2021-09-13 DIAGNOSIS — F03.90: ICD-10-CM

## 2021-09-13 DIAGNOSIS — F41.1 GENERALIZED ANXIETY DISORDER: ICD-10-CM

## 2021-09-13 DIAGNOSIS — F33.1 MODERATE RECURRENT MAJOR DEPRESSION (HCC): ICD-10-CM

## 2021-09-13 NOTE — PROGRESS NOTES
The patient could not get on to the virtual visit and will reschedule and come face-to-face for a follow up appointment.

## 2021-09-16 ENCOUNTER — OFFICE VISIT (OUTPATIENT)
Dept: BEHAVIORAL HEALTH | Facility: CLINIC | Age: 76
End: 2021-09-16
Payer: MEDICARE

## 2021-09-16 DIAGNOSIS — F41.1 GENERALIZED ANXIETY DISORDER: ICD-10-CM

## 2021-09-16 DIAGNOSIS — F33.1 MODERATE RECURRENT MAJOR DEPRESSION (HCC): ICD-10-CM

## 2021-09-16 PROCEDURE — 99213 OFFICE O/P EST LOW 20 MIN: CPT | Performed by: PSYCHIATRY & NEUROLOGY

## 2021-09-16 PROCEDURE — 90833 PSYTX W PT W E/M 30 MIN: CPT | Performed by: PSYCHIATRY & NEUROLOGY

## 2021-09-16 RX ORDER — ZOLPIDEM TARTRATE 10 MG/1
TABLET ORAL
Qty: 30 TABLET | Refills: 2 | Status: SHIPPED | OUTPATIENT
Start: 2021-09-16 | End: 2022-03-11 | Stop reason: SDUPTHER

## 2021-09-16 RX ORDER — ESCITALOPRAM OXALATE 10 MG/1
10 TABLET ORAL DAILY
Qty: 90 TABLET | Refills: 0 | Status: SHIPPED | OUTPATIENT
Start: 2021-09-16 | End: 2021-12-15

## 2021-09-16 NOTE — PROGRESS NOTES
"                                  [facility] PSYCHIATRIC FOLLOW-UP NOTE    This provider informed the patient their medical records are totally confidential except for the use by other providers involved in their care, or if the patient signs a release, or to report instances of child or elder abuse, or if it is determined they are an immediate risk to harm themselves or others.  To avoid spread of covid-19 virus this follow up session was conducted face to-face wearing masks and a face shield.    Time at beginning of session:  8:00 AM    TOTAL FACE-TO-FACE TIME  30 minutes    CHIEF COMPLAINT       Having depression, generalized anxiety, and neurocognitive disorder.  HISTORY OF PRESENT ILLNESS       The patient is a  74yo W male who is followed by this provider for recurrent Major Depression, Generalized Anxiety Disorder, and Pseudodementia.  He has had recurrent depressive episodes since high school with a typical pattern of anhedonia, decreased sleep and appetite, feeling worthless and hopeless, having moderate psychomotor retardation, and having passive suicidal ideation \"that people might be better off without him\".  He denies ever having an overt suicidal plan, intent, or attempt.  He has serious depressions in 1990, 1996, and 2001.       He also has had Generalized Anxiety Disorder with excessive worry and concern that he cannot get out of his mind, restlessness, irritability, difficulty relaxing, and muscle tension in his neck, shoulders, and upper back.       He has had serious medical problems including having a (L) carotid endarterectomy after a TIA, having a cardiac pacemaker, and having obstructive sleep apnea.  He was treated for seizure disorder in the past.             His depression has been associated with some pseudodementia and he will think slower and have decreased recent memory.  However, his wife has noticed he has difficulty using the TV remote, he has difficulty with " word finding, and he loses track of directions when driving to places he has been familiar before.         His father  in  from Parkinson's Disease and the patient was his major caretaker.  His 38yo daughter who has bipolar disorder has been living with the patient and his wife for the last 2 years.       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  He still has a problem with short term memory and he stays at home most of the time.  PSYCHOSOCIAL CHANGES SINCE PREVIOUS CONTACT       The patient has had 3 to 4 times when his vision acted unusually and his binocularity broke down and one eye looked upwards and one eye looked downwards.  These symptoms lasted only 15 seconds.  He is seeing a neurologist to evaluate these symptoms.  RESPONSE TO TREATMENT       Having alleviation of   PAST PSYCHIATRIC MEDICATIONS       Serzone, buspirone (hallucinations), sertraline, paroxetine, fluoxetine (spacy, lightheaded)  MEDICATION SIDE EFFECTS       Visual hallucinations and some disorientation from escitalopram 20mg po daily.  He therefore decreased the escitalopram dosage to 10mg po QAM and his side effects went away.    MEDICAL REVIEW OF SYSTEMS:   Constitutional negative   Eyes negative   Ears/Nose/Mouth/Throat positive - cervical spondylosis, (L) carotid endarterectomy, sinusitis   Cardiovascular positive - cardiac pacemaker   Respiratory positive - obtsructive sleep apnea with CPAP   Gastrointestinal negative   Genitourinary negative   Muscular negative   Integumentary negative   Neurological positive - seizure disorder   Endocrine negative   Hematologic/Lymphatic negative       MENTAL STATUS EVALUATION  There were no vitals taken for this visit.  Participation: Active verbal participation  Grooming:Neat  Orientation: Fully Oriented  Eye contact: Good  Behavior:Calm   Mood: Anxious  Affect: Constricted  Thought process:  Logical  Thought content:  Within normal limits  Speech: Rate within normal limits and Volume within normal limits  Perception:  Within normal limits  Memory:  No gross evidence of memory deficits  Insight: Adequate  Judgment: Adequate  Family/couple interaction observations:   Other:  BRAYAN-7 Questionnaire    Feeling nervous, anxious, or on edge:  2  Not being able to stop or control worryin  Worrying too much about different things:  2  Trouble relaxin  Being so restless that it's hard to sit still: 2   Becoming easily annoyed or irritable: 3  Feeling afraid as if something awful might happen:  3  Total:  15    Interpretation of BRAYAN 7 Total Score   Score Severity :  0-4 No Anxiety   5-9 Mild Anxiety  10-14 Moderate Anxiety  15-21 Severe Anxiety    Depression Screening    Little interest or pleasure in doing things?    3  Feeling down, depressed , or hopeless?   1  Trouble falling or staying asleep, or sleeping too much?    2  Feeling tired or having little energy?    2  Poor appetite or overeating?    2  Feeling bad about yourself - or that you are a failure or have let yourself or your family down?   1  Trouble concentrating on things, such as reading the newspaper or watching television?   2  Moving or speaking so slowly that other people could have noticed.  Or the opposite - being so fidgety or restless that you have been moving around a lot more than usual?    2  Thoughts that you would be better off dead, or of hurting yourself?    1  Patient Health Questionnaire Score:   16      If depressive symptoms identified deferred to follow up visit unless specifically addressed in assesment and plan.    Interpretation of PHQ-9 Total Score   Score Severity   1-4 No Depression   5-9 Mild Depression   10-14 Moderate Depression   15-19 Moderately Severe Depression   20-27 Severe Depression    Current risk:    Suicide: Low   Homicide: Not applicable   Self-harm: Low  Relapse: Moderate  Other:   Crisis Safety Plan  reviewed?No  If evidence of imminent risk is present, intervention/plan:    Medical Records/Labs/Diagnostic Tests Reviewed: yes    Medical Records/Labs/Diagnostic Tests Ordered: no    DIAGNOSTIC IMPRESSIONS       (1) Major Depression, Recurrent, Moderate (F33.1)       (2) Generalized Anxiety Disorder (F41.1)       (3) 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  However, his dementing change is progressing and he has recent memory loss.         Continue escitalopram 10mg po daily.       RTC to  Clinic in 2 months for 30 min follow up with this provider.    Time at end of session:  8:30 AM    Greater than 16 minutes spent in psychotherapy exclusive of my E&M.  Topics addressed in psychotherapy include:  He is afraid because he watched his father die from dementia.  He ruminates because he was his father's caretaker.  Discussed how medications for memory improve functioning.

## 2021-10-11 ENCOUNTER — TELEPHONE (OUTPATIENT)
Dept: NEUROLOGY | Facility: MEDICAL CENTER | Age: 76
End: 2021-10-11

## 2021-10-11 ENCOUNTER — OFFICE VISIT (OUTPATIENT)
Dept: NEUROLOGY | Facility: MEDICAL CENTER | Age: 76
End: 2021-10-11
Attending: PSYCHIATRY & NEUROLOGY
Payer: MEDICARE

## 2021-10-11 VITALS
HEIGHT: 65 IN | HEART RATE: 85 BPM | DIASTOLIC BLOOD PRESSURE: 84 MMHG | BODY MASS INDEX: 24.28 KG/M2 | TEMPERATURE: 97.9 F | RESPIRATION RATE: 12 BRPM | SYSTOLIC BLOOD PRESSURE: 132 MMHG | OXYGEN SATURATION: 97 % | WEIGHT: 145.72 LBS

## 2021-10-11 DIAGNOSIS — Z86.73 HISTORY OF STROKE: ICD-10-CM

## 2021-10-11 DIAGNOSIS — R41.3 MEMORY LOSS: ICD-10-CM

## 2021-10-11 PROCEDURE — 99214 OFFICE O/P EST MOD 30 MIN: CPT | Performed by: PSYCHIATRY & NEUROLOGY

## 2021-10-11 PROCEDURE — 99212 OFFICE O/P EST SF 10 MIN: CPT | Performed by: PSYCHIATRY & NEUROLOGY

## 2021-10-11 RX ORDER — KETOCONAZOLE 20 MG/ML
SHAMPOO TOPICAL
COMMUNITY
Start: 2021-10-06 | End: 2021-12-28

## 2021-10-11 RX ORDER — NYSTATIN 100000 U/G
OINTMENT TOPICAL
COMMUNITY
End: 2021-12-28

## 2021-10-11 RX ORDER — TRIAMCINOLONE ACETONIDE 1 MG/G
CREAM TOPICAL
COMMUNITY
End: 2021-12-28

## 2021-10-11 ASSESSMENT — ENCOUNTER SYMPTOMS: HALLUCINATIONS: 0

## 2021-10-11 NOTE — TELEPHONE ENCOUNTER
Called pt to request that he get a lipid panel completed as soon as possible, per Dr. Arevalo's request.     I have requested the paperwork from his last lab work/lipid profile from [facility]/Dr. Lott (cardiology). It is reportedly from 8/2020 and is to be faxed over today.

## 2021-10-11 NOTE — TELEPHONE ENCOUNTER
Called pt / LVM to let him know that Dr. Arevalo placed an order for an MRI- Brain today for Augusto. She would like him to get that imaging completed as soon as possible. Requested return call if any questions.    --------------------------------------------------------    Pt returned call saying that he is most likely ineligible for an MRI due to his pacemaker. Please advise.
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
## 2021-10-14 ENCOUNTER — HOSPITAL ENCOUNTER (OUTPATIENT)
Dept: RADIOLOGY | Facility: MEDICAL CENTER | Age: 76
End: 2021-10-14
Attending: NURSE PRACTITIONER
Payer: MEDICARE

## 2021-10-14 DIAGNOSIS — R13.14 PHARYNGOESOPHAGEAL DYSPHAGIA: ICD-10-CM

## 2021-10-14 DIAGNOSIS — R13.19 ESOPHAGEAL DYSPHAGIA: ICD-10-CM

## 2021-10-14 PROCEDURE — 92611 MOTION FLUOROSCOPY/SWALLOW: CPT

## 2021-10-14 PROCEDURE — 74230 X-RAY XM SWLNG FUNCJ C+: CPT

## 2021-10-14 NOTE — OP THERAPY EVALUATION
"[facility]  Speech-Language Pathology Department  Modified Barium Swallow Study Summary      Patient:  Augusto Montemayor      Date of Evaluation: 10/14/21    Referring Provider:  Princess RINCON   Fax: [phone]      Patient History/Reason for Referral: Pt was referred for MBS due to pharyngoesophageal dysphagia.    Patient with swallowing deficits for years.  Recently with coughing on liquids where \"goes the wrong way.\"  This is occurring about 1 time a week.  When having soft solids such as scrambled eggs, egg ad tuna salad, and mashed potatoes where will get stuck at laryngeal and sternal notch area.  Will take a couple of extra swallows or liquid wash to clear from pharynx.   Can have salad and crunchy solids with no complications.  Ahsan admit that is taking larger bites with soft solids as well as not masticating as much in comparison to the more complex solids. Stated he isn't eating the softer texture more than 1 time a week, but if he did, he would have this happen consistently.  Determined at end of evaluation he is having swallowing deficits more at night while he is eating/drinking with distraction (watching TV, talking, etc). Felt that Lexepro was causing swallowing difficulty and weaned off over the last month and has been completely off for a week with improved swallow function.  Feels he is back to 75% of his baseline.  At times can feel bolus in chest area which takes more to clear through to stomach.  Has no weight loss or vocal changes.    MOCA score 28/30 on 10/11/21.  Had EGD per report a couple of months ago with no improvements with swallowing.  Manometry also attempted, but unable to complete as there was an issue with procedure.    Past medical history not limited to: Afib, arthritis, CVA, dysphagia, seizures, heart burn, macular degeneration, depression, and KAMI       Oral Mechanism Exam:   -Dentition: natural and intact  -Labial: WFL  -Lingual: WFL  -Palatal Elevation: " WFL  -Laryngeal Elevation: mild decrease  -Cough: Strong  -Vocal Quality: Clear  - Circumlaryngeal Palpation: No edema, right sided pharyngeal pain 2/10.    Procedure Results:  The examination was conducted with videofluoroscopy from a   Lateral and Anterior view.  The following textures were presented:   Pudding, Diced Fruit, Cookie and Thin Liquid     Structural Abnormalities: Query CP Bar    The following observations were made:   (WFL unless otherwise noted)    Oral Phase Thin Liquids  Puree Mixed Solid   Lip Closure       Tongue Control During Bolus Hold X  Valleculae      Premature spillage into the valleculae X X     Premature spillage into the pyriform sinus X  X    Bolus Preparation/ Mastication    X  extended   Bolus Transport/ Lingual Motion  X  Slow     Oral Residue after swallow X  Collection  X  Collection    Initiation of Pharyngeal Swallow          Other Observations:   Only larger intake with residue and more spillage, small intake with no residue or spillage.         Pharyngeal Phase Thin Liquids  Puree Mixed Solid   Soft Palate Elevation       Laryngeal Elevation       Anterior Hyoid Excursion       Epiglottic Inversion  X X X X   Laryngeal Vestibular Closure       Pharyngeal Stripping Wave       Pharyngoesophageal Segment Opening (PES) X  Ret/retro X X  Ret/retro X   Tongue Base Retraction (BOT) and/or BOT Residue X  Collection        X   Collection     Residue in valleculae X  Collection      Residue in piriform sinus(es) X  Collection      Residue on posterior pharyngeal wall (PPW)       Penetration X      Aspiration         Other Observations:  Only larger intake with residue and penetration, small intake with no residue.      Penetration Aspiration Scale:   Score of 1: Neither penetration nor aspiration  Score of 2-5: Penetration  Score of 6-8: Aspiration    1: Material does not enter airway  2: Material enters airway, but remains above vocal folds; Ejected from airway; no stasis  3: Material  remains above vocal folds; visible stasis remains  4: Material contacts vocal folds, but is ejected; no stasis  5: Material contacts vocal folds, and is not ejected; visible stasis remains  6: Material passes glottis, but is ejected from airway; No visible subglottic stasis  7: Material passes glottis, but is not ejected from airway; visible subglottic stasis despite patient’s response  8: Material passes glottis, and is not ejected; visible subglottic stasis; Absent patient response                        Esophageal Phase: Retention and retrograde flow at UES with liquids and mixed viscosities.  Slight retention below UES with liquids.      Impressions:  Patient with penetration of thin liquids one time, only with large intake, no stasis.  No aspiration with all intake.  Small intake eliminated penetration and risks of occurrence.       Patient independently took additional swallows to clear oropharyngeal cavities.  Oropharyngeal residue varied from slight to mild with all intake, but only occurred with larger intake. Smaller intake with no deficits noted in all function. Patient with negative globus sensation at mid pharynx which I suspect was referred sensation from items at UES that were not clearing.  Soft solids with more residue than hard solids.  Educated on floating pills especially due to fluctuations with managing larger amounts of liquids needed to clear pills from oral cavity.    Mild retention and retrograde flow at UES was not observed to enter into vestibular space, but did enter into pharynx.  Query CP bar that minimally reduce flow through UES.  If strategies weren’t observed I would suspect that there would be intermittent ascending penetration/aspiration events occurring.  Suspect the retention is causing referred sensation in pharynx.    Oropharyngeal function within normal age related changes that are inconsistent.  When using strategies, he demonstrated improved tolerance of PO. Educated on  the aging brain and his current change in cognitive function and need to focus more on swallowing, no distractions, no talking, attending to bite and drink size, and increase mastication with softer items to ensure can pass pharynx to UES.         Recommendations:   Diet: Regular (IDDSI L 7)    Liquid: Thin (IDDSI L 0)          Medications:   Whole or floated    Compensatory Strategies:   siting up at 90 degrees, small bites/drinks, no distractions, avoid problematic foods, and no talking.        Your patient may benefit from a referral for:       1. Consider further assessment from GI as needed for UES dysfunction.        Thanks you for the referral.    For further questions, please call [phone].       Urmila Diaz M.S., CCC-SLP

## 2021-11-01 ENCOUNTER — TELEPHONE (OUTPATIENT)
Dept: NEUROLOGY | Facility: MEDICAL CENTER | Age: 76
End: 2021-11-01

## 2021-11-01 NOTE — TELEPHONE ENCOUNTER
"Returned call to Samantha Sim regarding health of , Alex. She says that he has recently(last week) had \"severe memory lapses and worsening headaches\" and \"worsening mood and personality changes\". Her concern is primarily about the recent progressive decline she is seeing in Alex. She would like to speak with Dr. Arevalo when convenient. Given numbers were  or .   "

## 2021-11-12 NOTE — TELEPHONE ENCOUNTER
Pt's wife called again today asking to schedule an appointment with Dr. Arevalo. Sent message to [employer] asking her to schedule them asap.

## 2021-12-28 ENCOUNTER — OFFICE VISIT (OUTPATIENT)
Dept: NEUROLOGY | Facility: MEDICAL CENTER | Age: 76
End: 2021-12-28
Attending: PSYCHIATRY & NEUROLOGY
Payer: MEDICARE

## 2021-12-28 VITALS
DIASTOLIC BLOOD PRESSURE: 80 MMHG | BODY MASS INDEX: 23.82 KG/M2 | SYSTOLIC BLOOD PRESSURE: 132 MMHG | HEART RATE: 74 BPM | TEMPERATURE: 98.7 F | WEIGHT: 143 LBS | HEIGHT: 65 IN | OXYGEN SATURATION: 98 %

## 2021-12-28 DIAGNOSIS — R41.3 MEMORY LOSS: ICD-10-CM

## 2021-12-28 PROBLEM — N45.1 EPIDIDYMITIS: Status: ACTIVE | Noted: 2021-10-14

## 2021-12-28 PROCEDURE — 99214 OFFICE O/P EST MOD 30 MIN: CPT | Performed by: PSYCHIATRY & NEUROLOGY

## 2021-12-28 RX ORDER — DOXYCYCLINE HYCLATE 100 MG/1
CAPSULE ORAL
COMMUNITY
Start: 2021-10-14 | End: 2021-12-28
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
## 2022-01-13 ENCOUNTER — HOSPITAL ENCOUNTER (OUTPATIENT)
Dept: RADIOLOGY | Facility: MEDICAL CENTER | Age: 77
End: 2022-01-13
Attending: PSYCHIATRY & NEUROLOGY
Payer: MEDICARE

## 2022-01-13 DIAGNOSIS — R41.3 MEMORY LOSS: ICD-10-CM

## 2022-01-13 PROCEDURE — A9584 IODINE I-123 IOFLUPANE: HCPCS

## 2022-01-17 ENCOUNTER — TELEPHONE (OUTPATIENT)
Dept: NEUROLOGY | Facility: MEDICAL CENTER | Age: 77
End: 2022-01-17

## 2022-01-17 NOTE — TELEPHONE ENCOUNTER
MAYELA for patient with information to [facility]. Per Dr. Arevalo, patient was having trouble logging back into [employer] account.     Phone number provided: [phone] for [employer] assistance.

## 2022-02-08 ENCOUNTER — OFFICE VISIT (OUTPATIENT)
Dept: NEUROLOGY | Facility: MEDICAL CENTER | Age: 77
End: 2022-02-08
Attending: PSYCHIATRY & NEUROLOGY
Payer: MEDICARE

## 2022-02-08 VITALS
BODY MASS INDEX: 23.84 KG/M2 | WEIGHT: 143.08 LBS | DIASTOLIC BLOOD PRESSURE: 58 MMHG | TEMPERATURE: 97.5 F | OXYGEN SATURATION: 96 % | HEIGHT: 65 IN | HEART RATE: 72 BPM | SYSTOLIC BLOOD PRESSURE: 118 MMHG

## 2022-02-08 DIAGNOSIS — G31.83 LEWY BODY DEMENTIA WITH BEHAVIORAL DISTURBANCE (HCC): ICD-10-CM

## 2022-02-08 DIAGNOSIS — F02.818 LEWY BODY DEMENTIA WITH BEHAVIORAL DISTURBANCE (HCC): ICD-10-CM

## 2022-02-08 PROCEDURE — 99214 OFFICE O/P EST MOD 30 MIN: CPT | Performed by: PSYCHIATRY & NEUROLOGY

## 2022-02-08 PROCEDURE — 99212 OFFICE O/P EST SF 10 MIN: CPT | Performed by: PSYCHIATRY & NEUROLOGY
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
## 2022-03-11 DIAGNOSIS — F33.1 MODERATE RECURRENT MAJOR DEPRESSION (HCC): ICD-10-CM

## 2022-03-11 RX ORDER — ZOLPIDEM TARTRATE 10 MG/1
TABLET ORAL
Qty: 30 TABLET | Refills: 2 | Status: SHIPPED | OUTPATIENT
Start: 2022-03-11 | End: 2022-03-24 | Stop reason: SDUPTHER

## 2022-03-11 NOTE — TELEPHONE ENCOUNTER
Upcoming appt. 03/24    Received request via: Pharmacy    Was the patient seen in the last year in this department? No    Does the patient have an active prescription (recently filled or refills available) for medication(s) requested? No

## 2022-03-24 ENCOUNTER — TELEMEDICINE (OUTPATIENT)
Dept: BEHAVIORAL HEALTH | Facility: CLINIC | Age: 77
End: 2022-03-24
Payer: MEDICARE

## 2022-03-24 DIAGNOSIS — F33.1 MODERATE RECURRENT MAJOR DEPRESSION (HCC): ICD-10-CM

## 2022-03-24 DIAGNOSIS — F02.80 DEMENTIA DUE TO PARKINSON'S DISEASE WITHOUT BEHAVIORAL DISTURBANCE (HCC): ICD-10-CM

## 2022-03-24 DIAGNOSIS — G20.A1 DEMENTIA DUE TO PARKINSON'S DISEASE WITHOUT BEHAVIORAL DISTURBANCE (HCC): ICD-10-CM

## 2022-03-24 DIAGNOSIS — F41.1 GENERALIZED ANXIETY DISORDER: ICD-10-CM

## 2022-03-24 PROCEDURE — 90833 PSYTX W PT W E/M 30 MIN: CPT | Performed by: PSYCHIATRY & NEUROLOGY

## 2022-03-24 PROCEDURE — 99214 OFFICE O/P EST MOD 30 MIN: CPT | Performed by: PSYCHIATRY & NEUROLOGY

## 2022-03-24 RX ORDER — ESCITALOPRAM OXALATE 5 MG/1
5 TABLET ORAL DAILY
Qty: 90 TABLET | Refills: 0 | Status: SHIPPED | OUTPATIENT
Start: 2022-03-24 | End: 2022-06-09

## 2022-03-24 RX ORDER — ZOLPIDEM TARTRATE 10 MG/1
TABLET ORAL
Qty: 30 TABLET | Refills: 2 | Status: SHIPPED | OUTPATIENT
Start: 2022-03-24 | End: 2022-04-23

## 2022-03-24 NOTE — PROGRESS NOTES
"                                  [facility] PSYCHIATRIC FOLLOW-UP NOTE    This provider informed the patient their medical records are totally confidential except for the use by other providers involved in their care, or if the patient signs a release, or to report instances of child or elder abuse, or if it is determined they are an immediate risk to harm themselves or others.  To avoid spread of covid-19 virus this follow up appointment was conducted face-to-face wearing masks and a face shield.    Time at beginning of session:  9:30 AM    TOTAL FACE-TO-FACE TIME  30 minutes    CHIEF COMPLAINT       Having depression, generalized anxiety, and neurocognitive disorder.  HISTORY OF PRESENT ILLNESS       The patient is a  76yo W male who is followed by this provider for recurrent Major Depression, Generalized Anxiety Disorder, and Pseudodementia.  He has had recurrent depressive episodes since high school with a typical pattern of anhedonia, decreased sleep and appetite, feeling worthless and hopeless, having moderate psychomotor retardation, and having passive suicidal ideation \"that people might be better off without him\".  He denies ever having an overt suicidal plan, intent, or attempt.  He has serious depressions in 1990, 1996, and 2001.       He also has had Generalized Anxiety Disorder with excessive worry and concern that he cannot get out of his mind, restlessness, irritability, difficulty relaxing, and muscle tension in his neck, shoulders, and upper back.       He has had serious medical problems including having a (L) carotid endarterectomy after a TIA, having a cardiac pacemaker, and having obstructive sleep apnea.  He was treated for seizure disorder in the past.             His depression has been associated with some pseudodementia and he will think slower and have decreased recent memory.  However, his wife has noticed he has difficulty using the TV remote, he has difficulty with " "word finding, and he loses track of directions when driving to places he has been familiar before.         His father  in  from Parkinson's Disease and the patient was his major caretaker.  His 38yo daughter who has bipolar disorder has been living with the patient and his wife for the last 2 years.       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  He still has a problem with short term memory and he stays at home most of the time.  With medical illnesses including carotid endarterectomy, KAMI, cardiac pacemaker, and seizures, and he had a ERIC scan which is similar to a PET scan which showed abnormalities in the caudate and putamen and striatum which were consistent with Parkinson\"s  dementing change.  His wife has never seen him with \"sundowning\" and recent imaging is consistent with Parkinson's.  PSYCHOSOCIAL CHANGES SINCE PREVIOUS CONTACT       The patient has been diagnosed with Parkinson's Disease.  RESPONSE TO TREATMENT       Having partial alleviation of depression and anxiety by escitalopram 10mg po daily.  PAST PSYCHIATRIC MEDICATIONS       Serzone, buspirone (hallucinations), sertraline, paroxetine, fluoxetine (spacy, lightheaded)  MEDICATION SIDE EFFECTS       The patient believes he's a slow metabolizer and a little bit of medication goes a long way.    MEDICAL REVIEW OF SYSTEMS:   Constitutional negative   Eyes negative   Ears/Nose/Mouth/Throat positive - cervical spondylosis, (L) carotid endarterectomy, sinusitis   Cardiovascular positive - cardiac pacemaker   Respiratory positive - obtsructive sleep apnea with CPAP   Gastrointestinal negative   Genitourinary negative   Muscular negative   Integumentary negative   Neurological positive - seizure disorder   Endocrine negative   Hematologic/Lymphatic negative       MENTAL STATUS EVALUATION  There were no vitals taken for this visit.  Participation: Active " verbal participation  Grooming:Neat  Orientation: Fully Oriented  Eye contact: Good  Behavior:Calm   Mood: Depressed  Affect: Constricted  Thought process: Logical  Thought content:  Within normal limits  Speech: Rate within normal limits and Volume within normal limits  Perception:  Within normal limits  Memory:  Poor memory for chronology of events  Insight: Good  Judgment: Good  Family/couple interaction observations:   Other:  BRAYAN-7 Questionnaire    Feeling nervous, anxious, or on edge:  0  Not being able to stop or control worryin  Worrying too much about different things:  2  Trouble relaxin  Being so restless that it's hard to sit still:  0  Becoming easily annoyed or irritable:  0  Feeling afraid as if something awful might happen:  0  Total:  6    Interpretation of BRAYAN 7 Total Score   Score Severity :  0-4 No Anxiety   5-9 Mild Anxiety  10-14 Moderate Anxiety  15-21 Severe Anxiety    Depression Screening    Little interest or pleasure in doing things?    2  Feeling down, depressed , or hopeless?   1  Trouble falling or staying asleep, or sleeping too much?   1   Feeling tired or having little energy?    1  Poor appetite or overeating?    1  Feeling bad about yourself - or that you are a failure or have let yourself or your family down?  2   Trouble concentrating on things, such as reading the newspaper or watching television?  2   Moving or speaking so slowly that other people could have noticed.  Or the opposite - being so fidgety or restless that you have been moving around a lot more than usual?    2  Thoughts that you would be better off dead, or of hurting yourself?    1  Patient Health Questionnaire Score:   13      If depressive symptoms identified deferred to follow up visit unless specifically addressed in assesment and plan.    Interpretation of PHQ-9 Total Score   Score Severity   1-4 No Depression   5-9 Mild Depression   10-14 Moderate Depression   15-19 Moderately Severe Depression    20-27 Severe Depression    Current risk:    Suicide: Low   Homicide: Not applicable   Self-harm: Low  Relapse: Moderate  Other:   Crisis Safety Plan reviewed?No  If evidence of imminent risk is present, intervention/plan:    Medical Records/Labs/Diagnostic Tests Reviewed: yes    Medical Records/Labs/Diagnostic Tests Ordered: no    DIAGNOSTIC IMPRESSIONS       (1) Major Depression, Recurrent, Moderate (F33.1)       (2) Generalized Anxiety Disorder (F41.1)       (3)  Dementing Change from Parkinson's Disease or Lewy Body Dementia (F01.50)    ASSESSMENT AND PLAN       Stable on current medications and dosages.       Continue escitalopram 10mg po daily.       Continue zolpidem 5mg po QHS as needed for sleep.       RTC to  Clinic in 3 months for 30 min follow up with this provider.    Time at end of session:  10:00 AM    Greater than 16 minutes spent in psychotherapy exclusive of my E&M.  Topics addressed in psychotherapy include:  The patient's cognitive problems wax and wane during the day or day-to-day.  He is afraid of developing Parkinson's Disease as a cause of his cognitive decline.

## 2022-05-18 DIAGNOSIS — F33.1 MODERATE RECURRENT MAJOR DEPRESSION (HCC): ICD-10-CM

## 2022-05-19 RX ORDER — ZOLPIDEM TARTRATE 10 MG/1
5 TABLET ORAL NIGHTLY PRN
Qty: 15 TABLET | Refills: 2 | Status: SHIPPED | OUTPATIENT
Start: 2022-05-19 | End: 2022-06-18

## 2022-06-09 RX ORDER — ESCITALOPRAM OXALATE 5 MG/1
TABLET ORAL
Qty: 90 TABLET | Refills: 0 | Status: SHIPPED | OUTPATIENT
Start: 2022-06-09 | End: 2022-10-03 | Stop reason: SDUPTHER

## 2022-08-15 ENCOUNTER — OFFICE VISIT (OUTPATIENT)
Dept: NEUROLOGY | Facility: MEDICAL CENTER | Age: 77
End: 2022-08-15
Attending: PSYCHIATRY & NEUROLOGY
Payer: MEDICARE

## 2022-08-15 VITALS
DIASTOLIC BLOOD PRESSURE: 58 MMHG | OXYGEN SATURATION: 97 % | HEART RATE: 67 BPM | RESPIRATION RATE: 16 BRPM | WEIGHT: 139.33 LBS | TEMPERATURE: 98.1 F | SYSTOLIC BLOOD PRESSURE: 114 MMHG | BODY MASS INDEX: 23.21 KG/M2 | HEIGHT: 65 IN

## 2022-08-15 DIAGNOSIS — F03.90 DEMENTIA WITHOUT BEHAVIORAL DISTURBANCE, UNSPECIFIED DEMENTIA TYPE: ICD-10-CM

## 2022-08-15 DIAGNOSIS — F32.A DEPRESSION, UNSPECIFIED DEPRESSION TYPE: ICD-10-CM

## 2022-08-15 PROCEDURE — 99212 OFFICE O/P EST SF 10 MIN: CPT | Performed by: PSYCHIATRY & NEUROLOGY

## 2022-08-15 PROCEDURE — 99214 OFFICE O/P EST MOD 30 MIN: CPT | Performed by: PSYCHIATRY & NEUROLOGY

## 2022-08-15 RX ORDER — FAMOTIDINE 40 MG/1
40 TABLET, FILM COATED ORAL NIGHTLY
COMMUNITY
Start: 2022-08-10

## 2022-08-15 RX ORDER — ZOLPIDEM TARTRATE 10 MG/1
TABLET ORAL
COMMUNITY
Start: 2022-08-01 | End: 2022-10-03 | Stop reason: SDUPTHER
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
## 2022-10-03 DIAGNOSIS — F51.01 PRIMARY INSOMNIA: ICD-10-CM

## 2022-10-03 NOTE — TELEPHONE ENCOUNTER
Upcoming appt. 10/12  Received request via: Patient    Was the patient seen in the last year in this department? Yes    Does the patient have an active prescription (recently filled or refills available) for medication(s) requested? No

## 2022-10-04 RX ORDER — ZOLPIDEM TARTRATE 10 MG/1
TABLET ORAL
Qty: 30 TABLET | Refills: 2 | Status: SHIPPED | OUTPATIENT
Start: 2022-10-04 | End: 2022-10-12 | Stop reason: SDUPTHER

## 2022-10-04 RX ORDER — ESCITALOPRAM OXALATE 5 MG/1
5 TABLET ORAL DAILY
Qty: 90 TABLET | Refills: 0 | Status: SHIPPED | OUTPATIENT
Start: 2022-10-04 | End: 2022-10-12 | Stop reason: SDUPTHER

## 2022-10-12 ENCOUNTER — OFFICE VISIT (OUTPATIENT)
Dept: BEHAVIORAL HEALTH | Facility: CLINIC | Age: 77
End: 2022-10-12
Payer: MEDICARE

## 2022-10-12 DIAGNOSIS — G31.83 MILD LEWY BODY DEMENTIA WITH PSYCHOTIC DISTURBANCE (HCC): ICD-10-CM

## 2022-10-12 DIAGNOSIS — F41.1 GENERALIZED ANXIETY DISORDER: ICD-10-CM

## 2022-10-12 DIAGNOSIS — F33.1 MODERATE RECURRENT MAJOR DEPRESSION (HCC): ICD-10-CM

## 2022-10-12 DIAGNOSIS — F02.A2 MILD LEWY BODY DEMENTIA WITH PSYCHOTIC DISTURBANCE (HCC): ICD-10-CM

## 2022-10-12 DIAGNOSIS — F51.01 PRIMARY INSOMNIA: ICD-10-CM

## 2022-10-12 PROCEDURE — 99214 OFFICE O/P EST MOD 30 MIN: CPT | Performed by: PSYCHIATRY & NEUROLOGY

## 2022-10-12 PROCEDURE — 90833 PSYTX W PT W E/M 30 MIN: CPT | Performed by: PSYCHIATRY & NEUROLOGY

## 2022-10-12 RX ORDER — ESCITALOPRAM OXALATE 5 MG/1
5 TABLET ORAL DAILY
Qty: 90 TABLET | Refills: 0 | Status: SHIPPED | OUTPATIENT
Start: 2022-10-12 | End: 2023-01-10

## 2022-10-12 RX ORDER — ZOLPIDEM TARTRATE 10 MG/1
TABLET ORAL
Qty: 30 TABLET | Refills: 2 | Status: SHIPPED | OUTPATIENT
Start: 2022-10-12 | End: 2022-11-12

## 2022-10-12 NOTE — PROGRESS NOTES
"                                  [facility] PSYCHI[facility] FOLLOW-UP NOTE    This provider informed the patient their medical records are totally confidential except for the use by other providers involved in their care, or if the patient signs a release, or to report instances of child or elder abuse, or if it is determined they are an immediate risk to harm themselves or others.  To avoid spread of covid-19 virus this follow up appointment was conducted face-to-face wearing masks and a face shield.     Time at end of session:  4:30 PM    TOTAL FACE-TO-FACE TIME  30 minutes    CHIEF COMPLAINT       Having depression, generalized anxiety, and neurocognitive disorder.  HISTORY OF PRESENT ILLNESS       The patient is a  76yo W male who is followed by this provider for recurrent Major Depression, Generalized Anxiety Disorder, and Pseudodementia.  He has had recurrent depressive episodes since high school with a typical pattern of anhedonia, decreased sleep and appetite, feeling worthless and hopeless, having moderate psychomotor retardation, and having passive suicidal ideation \"that people might be better off without him\".  He denies ever having an overt suicidal plan, intent, or attempt.  He has serious depressions in 1990, 1996, and 2001.       He also has had Generalized Anxiety Disorder with excessive worry and concern that he cannot get out of his mind, restlessness, irritability, difficulty relaxing, and muscle tension in his neck, shoulders, and upper back.       He has had serious medical problems including having a (L) carotid endarterectomy after a TIA, having a cardiac pacemaker, and having obstructive sleep apnea.  He was treated for seizure disorder in the past.             His depression has been associated with some pseudodementia and he will think slower and have decreased recent memory.  However, his wife has noticed he has difficulty using the TV remote, he has difficulty with word " "finding, and he loses track of directions when driving to places he has been familiar before.         His father  in  from Parkinson's Disease and the patient was his major caretaker.  His 40yo daughter who has bipolar disorder has been living with the patient and his wife for the last 2 years.       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  He still has a problem with short term memory and he stays at home most of the time.  With medical illnesses including carotid endarterectomy, KAMI, cardiac pacemaker, and seizures, and he had a ERIC scan which is similar to a PET scan which showed abnormalities in the caudate and putamen and striatum which were consistent with Parkinson\"s  dementing change.  His wife has never seen him with \"sundowning\" and recent imaging is consistent with Parkinson's.          PSYCHOSOCIAL CHANGES SINCE PREVIOUS CONTACT       ***  RESPONSE TO TREATMENT       ***  PAST PSYCHIATRIC MEDICATIONS       Serzone, buspirone (hallucinations), sertraline, paroxetine, fluoxetine (spacy, lightheaded)  MEDICATION SIDE EFFECTS       ***    MEDICAL REVIEW OF SYSTEMS:   Constitutional negative   Eyes negative   Ears/Nose/Mouth/Throat positive - cervical spondylosis, (L) carotid endarterectomy, sinusitis   Cardiovascular positive - cardiac pacemaker   Respiratory positive - obtsructive sleep apnea with CPAP   Gastrointestinal negative   Genitourinary negative   Muscular negative   Integumentary negative   Neurological positive - seizure disorder   Endocrine negative   Hematologic/Lymphatic negative          MENTAL STATUS EVALUATION  There were no vitals taken for this visit.  Participation: {[facility] PARTICIPATION MEASURES:[id number]}  Grooming:{[facility] GRO[facility]}  Orientation: {[facility] ORIENTATION:82406158}  Eye contact: {[facility] EYE CONTACT:26086182}  Behavior:{[facility] BEHAVIOR:15313904}   Mood: {RB " MOOD:89075537}  Affect: {[facility] AFFECT:76134929}  Thought process: {[facility] THOUGHT PROCESS:02283463}  Thought content:  {[facility] THOUGHT CONTENT:75521589}  Speech: {[facility] SPEECH:05883001}  Perception:  {[facility] PERCEPTION:93470543}  Memory:  {[facility] MEMORY:64631802}  Insight: {GOOD/ADEQUATE/LIMITED/POOR:90122309}  Judgment: {GOOD/ADEQUATE/LIMITED/POOR:81487486}  Family/couple interaction observations:   Other:    Current risk:    Suicide: Low   Homicide: Not applicable   Self-harm: Low  Relapse: Moderate  Other:   Crisis Safety Plan reviewed?No  If evidence of imminent risk is present, intervention/plan:    Medical Records/Labs/Diagnostic Tests Reviewed: yes    Medical Records/Labs/Diagnostic Tests Ordered: no    DIAGNOSTIC IMPRESSIONS       (1) Major Depression, Recurrent, Moderate (F33.1)       (2) Generalized Anxiety Disorder (F41.1)       (3)  Dementing Change from Parkinson's Disease or Lewy Body Dementia (F01.50)     ASSESSMENT AND PLAN       Stable on current medications and dosages.       Continue escitalopram 10mg po daily.       Continue zolpidem 5mg po QHS as needed for sleep.       RTC to  Clinic in 3 months for 30 min follow up with this provider.    Time at end of session:  5:00 PM    Greater than 16 minutes spent in psychotherapy exclusive of my E&M.  Topics addressed in psychotherapy include:  He has imaging consistent with Parkinson's and he will die of that disease.  He also has problems with vision, hearing, and memory.

## 2022-10-12 NOTE — PROGRESS NOTES
"                                  [facility] PSYCHIATRIC FOLLOW-UP NOTE    This provider informed the patient their medical records are totally confidential except for the use by other providers involved in their care, or if the patient signs a release, or to report instances of child or elder abuse, or if it is determined they are an immediate risk to harm themselves or others.  To avoid spread of covid-19 virus this follow up appointment was conducted face-to-face wearing masks and a face shield.     Time at beginning of call:  4:30 PM    TOTAL FACE-TO-FACE TIME  30 minutes    CHIEF COMPLAINT       Having depression, generalized anxiety, and neurocognitive disorder.  HISTORY OF PRESENT ILLNESS       The patient is a  76yo W male who is followed by this provider for recurrent Major Depression, Generalized Anxiety Disorder, and Pseudodementia.  He has had recurrent depressive episodes since high school with a typical pattern of anhedonia, decreased sleep and appetite, feeling worthless and hopeless, having moderate psychomotor retardation, and having passive suicidal ideation \"that people might be better off without him\".  He denies ever having an overt suicidal plan, intent, or attempt.  He has serious depressions in 1990, 1996, and 2001.       He also has had Generalized Anxiety Disorder with excessive worry and concern that he cannot get out of his mind, restlessness, irritability, difficulty relaxing, and muscle tension in his neck, shoulders, and upper back.       He has had serious medical problems including having a (L) carotid endarterectomy after a TIA, having a cardiac pacemaker, and having obstructive sleep apnea.  He was treated for seizure disorder in the past.             His depression has been associated with some pseudodementia and he will think slower and have decreased recent memory.  However, his wife has noticed he has difficulty using the TV remote, he has difficulty with " "word finding, and he loses track of directions when driving to places he has been familiar before.         His father  in  from Parkinson's Disease and the patient was his major caretaker.  His 38yo daughter who has bipolar disorder has been living with the patient and his wife for the last 2 years.       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  He still has a problem with short term memory and he stays at home most of the time.  With medical illnesses including carotid endarterectomy, KAMI, cardiac pacemaker, and seizures, and he had a ERIC scan which is similar to a PET scan which showed abnormalities in the caudate and putamen and striatum which were consistent with Parkinson\"s  dementing change.  His wife has never seen him with \"sundowning\" and recent imaging is consistent with Parkinson's or Lewy Body Dementia with visual and auditory hallucinations.  He also has eye hand coordianation.  PSYCHOSOCIAL CHANGES SINCE PREVIOUS CONTACT       Having a festenating gait and some difficulty with motor function.  He had a TURP on .  RESPONSE TO TREATMENT       Less dark thoughts on escitalopram 5mg po daily.  PAST PSYCHIATRIC MEDICATIONS       Serzone, buspirone (hallucinations), sertraline, paroxetine, fluoxetine (spacy, lightheaded)  MEDICATION SIDE EFFECTS       Gets pruritis on escitalopram at 10mg po daily.    MEDICAL REVIEW OF SYSTEMS:   Constitutional negative   Eyes negative   Ears/Nose/Mouth/Throat positive - cervical spondylosis, (L) carotid endarterectomy, sinusitis   Cardiovascular positive - cardiac pacemaker   Respiratory positive - obtsructive sleep apnea with CPAP   Gastrointestinal negative   Genitourinary negative   Muscular negative   Integumentary negative   Neurological positive - seizure disorder   Endocrine negative   Hematologic/Lymphatic negative        MENTAL STATUS EVALUATION  There " were no vitals taken for this visit.  Participation: Active verbal participation  Grooming:Neat  Orientation: Fully Oriented  Eye contact: Good  Behavior:Calm   Mood: Depressed  Affect: Blunted  Thought process: Logical  Thought content:  Within normal limits  Speech: Rate within normal limits and Volume within normal limits  Perception:  Within normal limits  Memory:  No gross evidence of memory deficits  Insight: Good  Judgment: Good  Family/couple interaction observations:   Other:  Depression Screening    Little interest or pleasure in doing things?    3  Feeling down, depressed , or hopeless?   2  Trouble falling or staying asleep, or sleeping too much?    1  Feeling tired or having little energy?    1  Poor appetite or overeating?    0  Feeling bad about yourself - or that you are a failure or have let yourself or your family down?   2  Trouble concentrating on things, such as reading the newspaper or watching television?   2  Moving or speaking so slowly that other people could have noticed.  Or the opposite - being so fidgety or restless that you have been moving around a lot more than usual?    2  Thoughts that you would be better off dead, or of hurting yourself?    2  Patient Health Questionnaire Score:   15      If depressive symptoms identified deferred to follow up visit unless specifically addressed in assesment and plan.    Interpretation of PHQ-9 Total Score   Score Severity   1-4 No Depression   5-9 Mild Depression   10-14 Moderate Depression   15-19 Moderately Severe Depression   20-27 Severe Depression   BRAYAN-7 Questionnaire    Feeling nervous, anxious, or on edge:  2  Not being able to stop or control worryin  Worrying too much about different things:  1  Trouble relaxin  Being so restless that it's hard to sit still:  1  Becoming easily annoyed or irritable:  0  Feeling afraid as if something awful might happen:  2  Total:  9    Interpretation of BRAYAN 7 Total Score   Score Severity  ":  0-4 No Anxiety   5-9 Mild Anxiety  10-14 Moderate Anxiety  15-21 Severe Anxiety   .MMSE    -What is the:  Year, season, date, day, month.               5/5 points    -Where are we:   State, county, town, hospital, floor.      5/5 points    -3 objects immediate recall.                                             3/3 points    -World backwards or serial sevens.                                 5/5 points    -Three-minute recall.                                                         2/3 points    -Name 2 objects.                                                               2/2 points    -Repeat the following.      \"No ifs and is or buts\"                                                    1/1 point    -Follow a 3 stage command.      Paper right hand, fold in half, place on floor.                 3/3 points    -Follow a written command.       Close your eyes                                                          1/1 point    -Write a complete sentence.                                           1/1 point    -Copy a design.                                                                0/1 point    Total                                                                                  28/30    Cutoff of 24 indicating dementia.   Current risk:    Suicide: Low   Homicide: Not applicable   Self-harm: Low  Relapse: Moderate  Other:   Crisis Safety Plan reviewed?No  If evidence of imminent risk is present, intervention/plan:    Medical Records/Labs/Diagnostic Tests Reviewed: yes    Medical Records/Labs/Diagnostic Tests Ordered: no    DIAGNOSTIC IMPRESSIONS       (1) Major Depression, Recurrent, Moderate (F33.1)       (2) Generalized Anxiety Disorder (F41.1)       (3)  Dementing Change from Parkinson's Disease or Lewy Body Dementia (F01.50)     ASSESSMENT AND PLAN       Stable on current medications and dosages.       Continue escitalopram 5mg po daily.       Continue zolpidem 5mg po QHS as needed for sleep.       RTC to "  Clinic in 3 months for 30 min follow up with this provider.    Time at end of call:  5:00 PM    Greater than 16 minutes spent in psychotherapy exclusive of my E&M.  Topics addressed in psychotherapy include:  He has imaging consistent with Parkinson's and is afraid of dying of that disease.  He also has difficulty with hearing, vision, and memory.

## 2022-11-02 ENCOUNTER — PATIENT MESSAGE (OUTPATIENT)
Dept: HEALTH INFORMATION MANAGEMENT | Facility: OTHER | Age: 77
End: 2022-11-02

## 2022-11-30 ENCOUNTER — TELEPHONE (OUTPATIENT)
Dept: NEUROLOGY | Facility: MEDICAL CENTER | Age: 77
End: 2022-11-30

## 2022-11-30 NOTE — TELEPHONE ENCOUNTER
Patient's wife called saying patient is having some concerning changes. He has been very unstable on his feet. He was vacuuming and felt like his body was forcing him to fall backwards and slid down the wall. He has been having halations and saying things that are not right.She is even wondering if he's having seizures.    She is not sure what to do he can't get in with a new provider until possibly April. He has appointment next week with a new pcp as the other one left.    Please advise    Thanks

## 2022-11-30 NOTE — TELEPHONE ENCOUNTER
I spoke with wife and  today -this seems like a progression of suspected Lewy body.  There is postural instability.  We discussed obtaining an EEG given his history of seizures.  I offered them an appointment in December which they would like to attend given this changes in August.  Currently there is no evidence of fever, abrupt decline.  I will reach out to Bridgette for scheduling.

## 2022-12-12 ENCOUNTER — TELEPHONE (OUTPATIENT)
Dept: NEUROLOGY | Facility: MEDICAL CENTER | Age: 77
End: 2022-12-12
Payer: MEDICARE

## 2022-12-19 ENCOUNTER — OFFICE VISIT (OUTPATIENT)
Dept: NEUROLOGY | Facility: MEDICAL CENTER | Age: 77
End: 2022-12-19
Attending: PSYCHIATRY & NEUROLOGY
Payer: MEDICARE

## 2022-12-19 VITALS
HEART RATE: 68 BPM | SYSTOLIC BLOOD PRESSURE: 112 MMHG | DIASTOLIC BLOOD PRESSURE: 66 MMHG | BODY MASS INDEX: 23.85 KG/M2 | TEMPERATURE: 97.7 F | OXYGEN SATURATION: 98 % | WEIGHT: 143.3 LBS

## 2022-12-19 DIAGNOSIS — G31.83 LEWY BODY DEMENTIA WITH BEHAVIORAL DISTURBANCE (HCC): ICD-10-CM

## 2022-12-19 DIAGNOSIS — F02.818 LEWY BODY DEMENTIA WITH BEHAVIORAL DISTURBANCE (HCC): ICD-10-CM

## 2022-12-19 PROCEDURE — 99214 OFFICE O/P EST MOD 30 MIN: CPT | Performed by: PSYCHIATRY & NEUROLOGY

## 2022-12-19 PROCEDURE — 99212 OFFICE O/P EST SF 10 MIN: CPT | Performed by: PSYCHIATRY & NEUROLOGY

## 2022-12-19 RX ORDER — DONEPEZIL HYDROCHLORIDE 10 MG/1
TABLET, FILM COATED ORAL
Qty: 90 TABLET | Refills: 2 | Status: SHIPPED | OUTPATIENT
Start: 2022-12-19 | End: 2023-11-20

## 2022-12-19 RX ORDER — ZOLPIDEM TARTRATE 5 MG/1
5 TABLET ORAL NIGHTLY PRN
COMMUNITY
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
## 2023-01-12 ENCOUNTER — APPOINTMENT (OUTPATIENT)
Dept: BEHAVIORAL HEALTH | Facility: CLINIC | Age: 78
End: 2023-01-12
Payer: MEDICARE

## 2023-04-25 ENCOUNTER — SPEECH THERAPY (OUTPATIENT)
Dept: SPEECH THERAPY | Facility: OTHER | Age: 78
End: 2023-04-25
Payer: MEDICARE

## 2023-04-25 DIAGNOSIS — R49.0 HYPOKINETIC PARKINSONIAN DYSPHONIA (HCC): ICD-10-CM

## 2023-04-25 DIAGNOSIS — G20.A1 HYPOKINETIC PARKINSONIAN DYSPHONIA (HCC): ICD-10-CM

## 2023-04-25 PROCEDURE — 31579 LARYNGOSCOPY TELESCOPIC: CPT | Performed by: SPEECH-LANGUAGE PATHOLOGIST

## 2023-04-25 NOTE — OP THERAPY EVALUATION
Outpatient Speech Therapy  INITIAL EVALUATION    [facility] SPEECH PATHOLOGY AND AUDIOLOGY  [address] [place] [address]  Phone:  [phone]  Fax:  [phone]    Date of Evaluation: 04/25/2023    Patient: Augusto Montemayor Jr.  YOB: 1945  MRN: [id number]     Referring Provider: STEFAN Singh   Referring Diagnosis G20     Time Calculation    Start time: 1310  Stop time: 1400 Time Calculation (min): 50 minutes           Chief Complaint: Voice Evaluation (Parkinson's Disease)    Visit Diagnoses     ICD-10-CM   1. Hypokinetic Parkinsonian dysphonia (HCC)  G20    R49.0     Subjective Evaluation  Past Medical History:   Diagnosis Date    Anesthesia     became combative with LAST surgery; unable to urinate and ended up  in the hospital    Arrhythmia     Ventricular Tachycardia    Arthritis     Bronchitis     Cataract 2018    Removed bilat    GERD (gastroesophageal reflux disease)     Heart burn     Indigestion     episodic    Major neurocognitive disorder probably due to vascular disease, without behavioral disturbance (HCC) 5/28/2021    KAMI (obstructive sleep apnea)     Uses a dental device    Osteoporosis     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    Pain     lumbar spine down L leg/buttock; 5/10    Palpitations 2/21/2012    Psychiatric problem     Depression    Seizure (HCC)     last seizure 05/1980    Stroke ([facility]) [address] [id number]    possible TIA, blind in one eye less than a minute duration    Syncope and collapse 2/21/2012    hypersensitive R carotid artery; why pt has a pacermaker    Trouble swallowing 07/22/2021    Unspecified hemorrhagic conditions     brusies easily, fragile skin, bleeds easily     Past Surgical History:   Procedure Laterality Date    LUMBAR DECOMPRESSION  12/2/2013    Performed by Aleksey Garcia M.D. at SURGERY [facility]    FORAMINOTOMY  12/2/2013    Performed by Aleksey Garcia M.D. at SURGERY [facility]    [place] NJX AA&/STRD TFRML EPI  LUMBAR/SACRAL 1 LEVEL  10/30/2013    Performed by Demond Sullivan M.D. at SURGERY [facility]    [place] NJX AA&/STRD TFRML EPI LUMBAR/SACRAL EA ADDL  10/30/2013    Performed by Demond Sullivan M.D. at SURGERY [facility]    CAROTID ENDARTERECTOMY  7/24/2013    Performed by Collins Conroy M.D. at SURGERY [facility]S    RECOVERY  2/23/2012    Performed by SURGERY, CATH-RECOVERY at SURGERY SAME DAY [facility]    OTHER      pacemaker    TONSILLECTOMY         Speech Therapy Objective      Assessments  Background:  Mr. Montemayor comes in with his wife and both serve as informants.  Mr. Montemayor was diagnosed with Lewy body dementia about 15 months ago and he developed PD symptoms about six months ago.  He complains that his voice is too soft to be audible in some situations, and by the end of the day, his voice is like a whisper. Both Mr. Montemayor and his wife admit to hearing impairment which exacerbates the dysphonia problem.  Mr. Monteamyor has hearing aids but does not wear them.  His wife is in the process of hearing aid assessment. They see Dr. Chevalier for ENT care.  Speech articulation was within normal limits. He complains of mild tremor but none was observed today. He is being treated for GERD and he complains of minor dysphagia.    Evaluation:  Vocal quality today was weak and breathy.  Total VHI= 66:  Functional=  26; Physiological= 20; and Emotional= 20.  This suggests that he perceives a severe handicap from dysphonia.  During a sustained vowel at 109 Hz, the RAP= 2.1%, and shimmer= 7.45%.  Total pitch range was greater than two octaves from a basal of 85 Hz to a high of 400 Hz.  The average fundamental frequency in connected speech was 105 Hz.  Dyspnea Index was zero.  Aerodynamic assessment revealed a mean airflow rate of  210 cc/second at a comfortable pitch and loudness.  MPT was 8.82 seconds with a mean airflow rate of 130 cc/second.  Maximum phonation volume was 1.18 liters.   A rigid endoscope was  introduced orally and the true vocal cords were studied by video stroboscopy.  Anatomically, the larynx and vocal cords appeared normal but there was thick mucus.  During phonation, there was asymmetry and aperiodicity of vibration. Excessive compliance in both vocal cords with the right greater than the left. Open phase dominated the cycle. Range, speed, and symmetry of vocal cord abduction and adduction were within normal limits.    Speech Therapy Plan :   Prognosis & Recommendations  Impression Summary:  Mr. Montemayor has a weak, breathy voice of about six months duration that is consistent with a diagnosis of PD. Fundamental frequency range and habitual pitch were within normal limits.  Laryngeal air flow was mildly high in habitual voice but within normal limits for maximum phonation time measurement. Expiratory volume was low but may have reflected lack of maximum effort.  Vocal acoustic measures were high. We offered PD voice training in the summer voice therapy session.  They would like to be contacted when that happens but did not commit.      Prognosis:  Good  Goals  Short Term Goals:  STG1:  Establish vocal hygiene program.  STG2:  Initiate Speak Out Training  Short Term Goal Duration (Weeks):  6-8 weeks  Patient progression on Short Term Goals:  LTG1:  Monitor and maintain Speak Out training.  Long Term Goal Duration (Weeks):  2-4 months  Patient Stated Goal:  Improve vocal loudness.  Therapy Recommendations  Recommendation:  Individual Speech Therapy,[id number]  Frequency:  1x week    Functional Assessment Used       Referring provider co-signature:  I have reviewed this plan of care and my co-signature certifies the need for services.    Certification Period: 04/25/2023 to  08/04/2023    Physician Signature: ________________________________ Date: ______________

## 2023-05-03 ENCOUNTER — TELEPHONE (OUTPATIENT)
Dept: NEUROLOGY | Facility: MEDICAL CENTER | Age: 78
End: 2023-05-03
Payer: MEDICARE

## 2023-05-15 ENCOUNTER — OFFICE VISIT (OUTPATIENT)
Dept: NEUROLOGY | Facility: MEDICAL CENTER | Age: 78
End: 2023-05-15
Attending: PSYCHIATRY & NEUROLOGY
Payer: MEDICARE

## 2023-05-15 VITALS
TEMPERATURE: 98.4 F | WEIGHT: 139.11 LBS | BODY MASS INDEX: 23.18 KG/M2 | HEIGHT: 65 IN | DIASTOLIC BLOOD PRESSURE: 62 MMHG | SYSTOLIC BLOOD PRESSURE: 124 MMHG | HEART RATE: 70 BPM | OXYGEN SATURATION: 97 %

## 2023-05-15 DIAGNOSIS — G31.9 DEGENERATIVE DISEASE OF NERVOUS SYSTEM, UNSPECIFIED (HCC): ICD-10-CM

## 2023-05-15 DIAGNOSIS — G20.C PARKINSONISM, UNSPECIFIED PARKINSONISM TYPE (HCC): ICD-10-CM

## 2023-05-15 DIAGNOSIS — F03.A0 MILD NEURODEGENERATIVE DEMENTIA (HCC): ICD-10-CM

## 2023-05-15 PROCEDURE — 3074F SYST BP LT 130 MM HG: CPT | Performed by: PSYCHIATRY & NEUROLOGY

## 2023-05-15 PROCEDURE — 99215 OFFICE O/P EST HI 40 MIN: CPT | Performed by: PSYCHIATRY & NEUROLOGY

## 2023-05-15 PROCEDURE — 99212 OFFICE O/P EST SF 10 MIN: CPT | Performed by: PSYCHIATRY & NEUROLOGY

## 2023-05-15 PROCEDURE — 3078F DIAST BP <80 MM HG: CPT | Performed by: PSYCHIATRY & NEUROLOGY

## 2023-05-15 RX ORDER — ERYTHROMYCIN 5 MG/G
OINTMENT OPHTHALMIC
COMMUNITY
End: 2023-11-20

## 2023-05-15 RX ORDER — OMEPRAZOLE 20 MG/1
20 CAPSULE, DELAYED RELEASE ORAL DAILY
COMMUNITY

## 2023-05-15 RX ORDER — FLUOXETINE 10 MG/1
20 CAPSULE ORAL DAILY
COMMUNITY
Start: 2023-03-09

## 2023-05-15 ASSESSMENT — MONTREAL COGNITIVE ASSESSMENT (MOCA)
3. DRAW A CLOCK: CONTOUR, NUMBERS, HANDS: 3/3
6. READ LIST OF DIGITS [FORWARD/BACKWARD]: 2/2
8. SERIAL SUBTRACTION OF 7S: 4 OR 5/5
DELAYED RECALL SUBSCORE: 3/5
4. NAME EACH OF THE THREE ANIMALS SHOWN: 3/3
10. [FLUENCY] NAME WORDS STARTING WITH DESIGNATED LETTER: 1/1
ORIENTATION SUBSCORE: 6/6
5. MEMORY TRIALS: SECOND TRIAL
9. REPEAT EACH SENTENCE: 2/2
WHAT IS THE VERSION OF MOCA ADMINISTERED: 8.1
WHAT IS THE TOTAL SCORE (OUT OF 30): 26
7. [VIGILENCE] TAP WHEN HEARING DESIGNATED LETTER: 1/1
2. COPY DRAWING: 0/1
11. FOR EACH PAIR OF WORDS, WHAT CATEGORY DO THEY BELONG TO (OUT OF 2): 2/2
1. ALTERNATING TRAIL MAKING: 0/1

## 2023-05-15 NOTE — PROGRESS NOTES
"Neuro note:    Followed by Dr. Tran Arevalo for 2 year or so for Dementia issue.    Last seen by Tran in 2022 at [facility].    He is here with Samantha and worked as a capable  - retired in .    About 3 years or so go when he started to have problems with his remote control (like checking the DVR)- he would hit the wrong buttons and then frustrated. There was also some \"short term memory loss\"> wife would talk about going somewhere and he would forget the conversation with 5 minutes and with slight forgetfulness over the last 2-3 years or so.    For more than 3-4 days of the week he will repeat himself within 5 to 10 minutes for well over 2 years or so.    The wife  has noticed he has problems with dates and times for over 2 years.    2 or 3 times a week he may see a formed person or animal or \"is something moving throughout the yard\"    The wife does  not endorse any notable cognitive/behavioral fluctuations in the last 12 months.    He has for the last 5-6 years or so wife has noticed he body his kick or jump which can last 1-2 hours and he can infrequently snore and he uses a dental device and he tried a CPAP machine (which drove him insane due to the mask).    Voice has clearly gotten softer in the last 6-12 months.    He has  had no falls or near falls in the last 6 months.    He has not had any dysphagia in the last 3 to 6 months.    He denies lack or loss of appetite in the recent months.    He  used to have a left hand rest tremor and then moved to the right hand and handwriting has gotten smaller.    ERIC Scan done about 2 to 3 years and was abnormal.    Family Hx: Parkinson's Disease- in mid 50's and  at age 78.            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    Pacemaker [employer] placed 2/23/12 02/24/2012    Syncope and collapse 02/21/2012    Palpitations 02/21/2012       Past medical history:   Past Medical History:   Diagnosis Date    Anesthesia     became combative with LAST surgery; unable to urinate and ended up  in the hospital    Arrhythmia     Ventricular Tachycardia    Arthritis     Bronchitis     Cataract 2018    Removed bilat    GERD (gastroesophageal reflux disease)     Heart burn     Indigestion     episodic    Major neurocognitive disorder probably due to vascular disease, without behavioral disturbance ([facility]) 5/28/2021    KAMI (obstructive sleep apnea)     Uses a dental device    Osteoporosis     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    Pain     lumbar spine down L leg/buttock; 5/10    Palpitations 2/21/2012    Psychiatric problem     Depression    Seizure ([facility])     last seizure 05/1980    Stroke ([facility]) [address] [id number]    possible TIA, blind in one eye less than a minute duration    Syncope and collapse 2/21/2012    hypersensitive R carotid artery; why pt has a pacermaker    Trouble swallowing 07/22/2021    Unspecified hemorrhagic conditions     brusies easily, fragile skin, bleeds easily       Past surgical history:   Past Surgical History:   Procedure Laterality Date    LUMBAR DECOMPRESSION  12/2/2013    Performed by Aleksey Garcia M.D. at SURGERY [facility] ORS    FORAMINOTOMY  12/2/2013    Performed by Aleksey Garcia M.D. at [facility]    [place] NJX AA&/STRD TFRML EPI LUMBAR/SACRAL 1 LEVEL  10/30/2013    Performed by Demond Sullivan M.D. at SURGERY [facility]S    [place] NJX AA&/STRD TFRML EPI LUMBAR/SACRAL EA ADDL   10/30/2013    Performed by Demond Sullivan M.D. at SURGERY [facility]    CAROTID ENDARTERECTOMY  2013    [facility] Collins Conroy [facility]    RECOVERY  2012    Performed by SURGERY CATH-RECOVERY at SURGERY SAME DAY [facility] ORS    OTHER      pacemaker    TONSILLECTOMY           Social history:   Social History     Socioeconomic History    Marital status:      Spouse name: Not on file    Number of children: Not on file    Years of education: Not on file    Highest education level: Not on file   Occupational History    Not on file   Tobacco Use    Smoking status: Former     Packs/day: 3.00     Years: 6.00     Pack years: 18.00     Types: Cigarettes     Quit date: 1967     Years since quittin.4    Smokeless tobacco: Never   Vaping Use    Vaping Use: Never used   Substance and Sexual Activity    Alcohol use: Yes     Alcohol/week: 0.0 oz     Comment: RARE 1 every 6 months    Drug use: No    Sexual activity: Not on file   Other Topics Concern    Not on file   Social History Narrative    Not on file     Social Determinants of Health     Financial Resource Strain: Not on file   Food Insecurity: Not on file   Transportation Needs: Not on file   Physical Activity: Not on file   Stress: Not on file   Social Connections: Not on file   Intimate Partner Violence: Not on file   Housing Stability: Not on file       Family history:   Family History   Problem Relation Age of Onset    Other Mother         wegeners granulomatosis    Psychiatric Illness Mother     Other Father         parkinsons,dementia    Psychiatric Illness Sister          Current medications:   Current Outpatient Medications   Medication    FLUoxetine (PROZAC) 10 MG Cap    omeprazole (PRILOSEC) 20 MG delayed-release capsule    zolpidem (AMBIEN) 5 MG Tab    donepezil (ARICEPT) 10 MG tablet    famotidine (PEPCID) 40 MG Tab    Multiple Vitamin (MULTIVITAMIN PO)    rosuvastatin (CRESTOR) 5 MG Tab    docusate  "sodium (COLACE) 100 MG CAPS    Probiotic Product (PRO-BIOTIC BLEND PO)    Lutein 20 MG CAPS    erythromycin 5 MG/GM Ointment    omeprazole (PRILOSEC) 40 MG delayed-release capsule     No current facility-administered medications for this visit.       Medication Allergy:  Allergies   Allergen Reactions    Serzone [Nefazodone Hydrochloride] Anaphylaxis    Ibuprofen Hives     Chest pains if taken long term    Nsaids Hives     Chest pain, if taken long term    Lexapro      Pt reports double vision and confusion    Other Misc Rash     Adhesive tape,  3-0 vicryl, 4-0 monocryl. Paper tape is ok for short-periods.           Physical examination:   Vitals:    05/15/23 0951   BP: 124/62   Pulse: 70   Temp: 36.9 °C (98.4 °F)   TempSrc: Temporal   SpO2: 97%   Weight: 63.1 kg (139 lb 1.8 oz)   Height: 1.651 m (5' 5\")       Normal cephalic atraumatic.  There is full range of movement around the neck in all directions without restrictions or discrete pain evoked triggers.  No lower extremity edema.      Neurological  Exam:      Matthew Cognitive Assessment (MOCA) Version 7.1    Years of Education: 4 years college    TOTAL SCORE: 26/30  (to be scanned into the MEDIA section in the E.M.R.)          Mental status: Awake, alert and fully oriented to person, place, time, and situation. Normal attention, concentration, and fund of knowledge for education level.  Did not appear/act combative,irritable,anxious,paranoid/delusional or aggressive to or with me.    Speech and language: Speech is fluent without errors, clear, intact to repetition, and intact to naming.     Follows 3 step motor commands in sequence without significant delay and correctly.    Cranial nerve exam:  II: Pupils are equally round and reactive to light. Visual fields are intact by confrontation.  III, IV, VI: EOMI, no diplopia, no ptosis.  V: Sensation to light touch is normal over V1-3 distributions bilaterally.  .  VII: Facial movements are symmetrical. There is no " facial droop. .  VIII: Hearing intact to soft speech and finger rub bilaterally  IX: Palate elevates symmetrically, uvula is midline. Dysarthria is not present.  XI: Shoulder shrug are symmetrical and strong.   XII: Tongue protrudes midline.      Motor exam:  Muscle tone is normal in all 4 limbs.    Left vs Right hand is mildly slow when opening and closing hand vs left hand.    Left vs right foot (reduced speed in tapping).    Left leg vs right is slower.        Muscle strength:    Neck Flexors/Extensors: 5/5       Right  Left  Deltoid   5/5  5/5      Biceps   5/5  5/5  Triceps              5/5  5/5   Wrist extensors 5/5  5/5  Wrist flexors  5/5  5/5     5/5  5/5  Interossei  5/5  5/5  Thenar (APB)  5/5  5/5   Hip flexors  5/5  5/5  Quadriceps  5/5  5/5    Hamstrings  5/5  5/5  Dorsiflexors  5/5  5/5  Plantarflexors  5/5  5/5  Toe extension  5/5  5/5            Reflexes:       Right  Left  Biceps   2/4  2/4  Triceps               2/4  2/4  Brachioradialis  2/4  2/4  Knee jerk  2/4  2/4  Ankle jerk  2/4  2/4     Frontal release signs are absent    bilaterally toes are downgoing to plantar stimulation..    Coordination (finger-to-nose, heel/knee/shin, rapid alternating movements) was normal.     There was no ataxia, no tremors, and no dysmetria.     Station and gait were normal. Easily stands up from exam chair without retropulsion,veering,leaning,swaying (to either side).       Labs and Tests:     NEUROIMAGIN/2022:    HISTORY/REASON FOR EXAM: Atypical Parkinson's versus essential tremor  Bilateral hand tremors and memory loss.     TECHNIQUE/EXAM DESCRIPTION AND NUMBER OF VIEWS:  DaTscan? Ioflupane I123        COMPARISON: None.     PROCEDURE:     Oral administration of Lugol solution to block thyroid uptake.     Under standardized conditions, 3.96 MCI of Ioflupane I123 was injected IV.     3 hour after the injection, SPECT imaging of the head were obtained.     FINDINGS:  There is asymmetric slightly  reduced uptake in the bilateral caudates, left more than right.     Significantly reduced/near absent uptake in the bilateral putamens        IMPRESSION:        Abnormal pattern of uptake in the striata is suggestive of a Parkinsonian type syndrome.              Exam Ended: 01/13/22  4:41 PM Last Resulted: 01/13/22  5:03 PM             Head CT    1/22/2021 1:24 PM     HISTORY/REASON FOR EXAM:  History of fall with loss of consciousness and memory loss.        TECHNIQUE/EXAM DESCRIPTION AND NUMBER OF VIEWS:  CT of the head without contrast.     Contiguous 5 mm axial sections were obtained from the skull base through the vertex.     Up to date radiation dose reduction adjustments have been utilized to meet ALARA standards for radiation dose reduction.     COMPARISON:  None available     FINDINGS:     There is no evidence of extra-axial hemorrhage. No intra-axial hemorrhage is noted. There is no brain swelling or edema. No mass effect or midline shift is noted.  There is decreased attenuation in the periventricular white matter.     IMPRESSION:        NO ACUTE ABNORMALITIES ARE NOTED ON CT SCAN OF THE HEAD.     Decreased attenuation in the cerebral white matter likely indicates microvascular ischemic disease.           Exam Ended: 01/22/21  1:24 PM Last Resulted: 01/22/21              Impression/Plans/Recommendations:    Parkinsonism- onset 3 years ago with associated cognitive/memory disturbance.    Differential includes Parkinson's Dz vs Lewy Body.    The clinical issues that support Lewy Body Disease including very mild visual hallucination.    There has also been REM Sleep Behavioral symptoms.    However he has no history of cognitive fluctuations per wife in the last 3-6 months.    2. Early stage of a  Dementia- related to #1> based on information provided by wife and verified on the Global Deterioration Score today (12 today per wife)> wife gave him 3 (2's) today.    Global Deterioration Score today in the 3 to  "range.    MOCA of 26 with clear difficulties on \"trails\", mild difficulties with cube copying and some spatial issues on clock drawing.    We discussed the consideration of Dozepezil (10 mg a day) vs other cognitive medications like Namenda and he is in agreement to not use them at this time or point.    Plans:    A. Brain PET Scan to be arranged to determine with a higher degree of certainty where Augusto is on the parkinsonian spectrum    B. Driving simulator testing to be arranged.    C. He is going PT due to stooping.    D. We discussed the importance of exercise over time.    E. Will hold off on Dopamine medications at this time- we reviewed Sinemet today.    I have performed  a history and physical exam and a directed /focused  ROS today.    Total time spent today or this patient's care was  60 and included reviewing  the diagnostic workup to date (such as labs and imaging as well as interpreting such tests relevant to this patient's neurological condition),  reviewing/obtaining separately obtained history (from patient and/or accompanying ffamily member)  for today's neurological problem(s) ,counseling and educating the patient and family member on issues related to cognition/memory and cognitive health factors and documenting  the clinical information in the EMR.    Follow up in 6 months or so.        Calvin Mckeon MD  [employer].   [facility]    "

## 2023-06-06 ENCOUNTER — HOSPITAL ENCOUNTER (OUTPATIENT)
Dept: RADIOLOGY | Facility: MEDICAL CENTER | Age: 78
End: 2023-06-06
Attending: PSYCHIATRY & NEUROLOGY
Payer: MEDICARE

## 2023-06-06 DIAGNOSIS — F03.A0 MILD NEURODEGENERATIVE DEMENTIA (HCC): ICD-10-CM

## 2023-06-06 DIAGNOSIS — G31.9 DEGENERATIVE DISEASE OF NERVOUS SYSTEM, UNSPECIFIED (HCC): ICD-10-CM

## 2023-06-06 PROCEDURE — A9552 F18 FDG: HCPCS

## 2023-06-07 ENCOUNTER — TELEPHONE (OUTPATIENT)
Dept: NEUROLOGY | Facility: MEDICAL CENTER | Age: 78
End: 2023-06-07
Payer: MEDICARE

## 2023-06-07 NOTE — TELEPHONE ENCOUNTER
Calvin Mckeon M.D.  Kendra Fine, Med Ass't    Kendra       Please send the  LETTER that I just wrote to Augusto in the [place] Mail.     He is not using MY CHART.     Tino garber    Letter mailed today

## 2023-06-08 ENCOUNTER — TELEPHONE (OUTPATIENT)
Dept: NEUROLOGY | Facility: MEDICAL CENTER | Age: 78
End: 2023-06-08
Payer: MEDICARE

## 2023-06-08 NOTE — TELEPHONE ENCOUNTER
[phone]  or [phone]  Samantha wife called states she would like to speak with dr Mckeon regarding the PET scan results. She states they received the letter today however she has questions.  Please advise

## 2023-06-14 ENCOUNTER — SPEECH THERAPY (OUTPATIENT)
Dept: SPEECH THERAPY | Facility: OTHER | Age: 78
End: 2023-06-14
Payer: MEDICARE

## 2023-06-14 DIAGNOSIS — G20.A1 HYPOKINETIC PARKINSONIAN DYSPHONIA (HCC): ICD-10-CM

## 2023-06-14 DIAGNOSIS — R49.0 HYPOKINETIC PARKINSONIAN DYSPHONIA (HCC): ICD-10-CM

## 2023-06-14 PROCEDURE — 99999 PR NO CHARGE: CPT | Performed by: SPEECH-LANGUAGE PATHOLOGIST

## 2023-06-16 NOTE — OP THERAPY DAILY TREATMENT
Outpatient Speech Therapy  DAILY TREATMENT     [facility] SPEECH PATHOLOGY AND AUDIOLOGY  [address] [place] [id number]  Phone:  [phone]  Fax:  [phone]    Date: 06/14/2023    Patient: Augusto Montemayor Jr.  YOB: 1945  MRN: [id number]     Time Calculation    Start time: 1230  Stop time: 1325 Time Calculation (min): 55 minutes         Chief Complaint: No chief complaint on file.    Visit #: 2    Subjective Evaluation    Mr. Montemayor arrived on time accompanied by his wife. Mr. Montemayor often needed redirection throughout the session. He told jokes to the clinicians in between and during baseline measurements. Mr. Montemayor had a positive attitude throughout the session and when asked why he was in speech therapy, Mr. Montemayor reported that his family has a difficult time hearing him.    Speech Therapy Objective  Sustained 'ah'  Average: 10.6 sec  Average: 60.6 dB  Lowest to highest pitch   Average: 406.2 Hz  Highest to lowest pitch  Average: 132.8 Hz  Word reading   Average: 50 dB       Assessments    Mr. Montemayor has a diagnosis of Lewy body dementia and Parkinson's disease. Mr. Montemayor presents with mild, hypokinetic dysarthria characterized by low volume, weak breathy voice, and variable rates.    Mr. Montemayor sustained 'ah', for as long as possible, at a comfortable pitch for an average of 10.6 seconds which is WNL. Mr. Montemayor's average intensity on this task was 60.6 dB. The expected intensity on this task for Mr. Montemayor is between 70-80 dB. Mr. Montemayor is ready to work on increasing his intensity through directed practice.    Mr. Montemayor's average highest pitch was 406.2 Hz and and his average lowest pitch was 132.8. This demonstrates a fundamental frequency range that is within normal limits.     Mr. Montemayor read a list of words at an average of 50 dB. Mr. Montemayor read at a fast rate that reduced his comprehensibility to approximately 50%.    Speech Therapy Plan    Mr. Montemayor will be seen again on 6/21/23.    will continue practice the intensive respiratory program.     LTG: Mr. Montemayor will demonstrate carryover from therapy to his home environment as reported by his wife.     STG1: Mr. Montemayor will increase his average loudness on sustained 'ah' to 75 dB in two consecutive sessions.     STG2: Mr. Montemayor will increase his average loudness on sentences to 75 dB in two consecutive sessions.    Visit #: 2/40      [x] As the licensed therapist supervising this student, I was present during the entire treatment session directing the care and reviewing the assessment plan.  I reviewed all documentation prior to signing.    (Optional) The following changes or alternations were made by the licensed therapist.

## 2023-06-21 ENCOUNTER — SPEECH THERAPY (OUTPATIENT)
Dept: SPEECH THERAPY | Facility: OTHER | Age: 78
End: 2023-06-21
Payer: MEDICARE

## 2023-06-21 DIAGNOSIS — R49.0 HYPOKINETIC PARKINSONIAN DYSPHONIA (HCC): ICD-10-CM

## 2023-06-21 DIAGNOSIS — G20.A1 HYPOKINETIC PARKINSONIAN DYSPHONIA (HCC): ICD-10-CM

## 2023-06-21 PROCEDURE — 99999 PR NO CHARGE: CPT | Performed by: SPEECH-LANGUAGE PATHOLOGIST

## 2023-06-23 NOTE — OP THERAPY DAILY TREATMENT
"  Outpatient Speech Therapy  DAILY TREATMENT     [facility] SPEECH PATHOLOGY AND AUDIOLOGY  [address] [place] [address]  Phone:  [phone]  Fax:  [phone]    Date: 06/21/2023    Patient: Augusto Montemayor Jr.  YOB: 1945  MRN: [id number]     Time Calculation    Start time: 1230  Stop time: 1325 Time Calculation (min): 55 minutes         Chief Complaint: Speech Therapy    Visit #: 3    Subjective Evaluation  Mr. Montemayor was seen for speech therapy services today. His wife accompanied him to the session. He regularly required redirection to stimulus after telling jokes and became distracted easily. However, redirection was successful following clinician prompts. Mr. Montemayor appeared to have a positive attitude and reported that he had completed his homework.     Speech Therapy Objective   1. Sustained \"ah\"   a. Average: 14.7 sec  b. Average: 70 dB  2. Lowest to Highest Pitch  a. Average:456.9 Hz  3. Highest to Lowest Pitch   a. Average: 115 Hz  3. Word Reading  a. Average: 58 dB    Assessments    Mr. Montemayor presents with Parkinson's disease and Lewy body dementia. 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     Mr. Montemayor sustained \"ah\"  for as long as possible at a comfortable pitch for an average of 14.7 seconds, demonstrating WNL. This is an increase from last session (10.6 seconds to 14.7 seconds). His average intensity for this phonation task was 70 dB, which is WNL since the expected intensity for this task is between 70-80 dB. This is also an increase from last session (60.6 dB to 70 dB). Clinician modeling of the task with a timer and the verbal cue \"Think loud!\" assisted with higher intensity, duration, and force. Reminders to drink water prevented vocal fatigue.    Mr. Montemayor's average highest pitch was 456.9 Hz and his average lowest pitch was 115 Hz. The goal for these exercises is to re " "calibrate his perception of loudness, leading to speaking at a louder and more typical volume. This is consistent with a fundamental frequency range that is within normal limits. He demonstrated a higher and lower average pitch than last session.     Mr. Montemayor read a list of words at an average of 58 dB. He read at a slower pace than he did last time, increasing his comprehensibility from 50% to 85%. The verbal cue, \"Use your loud \"ah\" voice\" resulted in Mr. Montemayor's increased intensity.     Speech Therapy Plan  Mr. Montemayor will continue practicing his 10 functional phrases at home. He will return to speech therapy on 6/28/2023.    LTG: Mr. Montemayor will demonstrate carryover from therapy to his home environment as reported by his wife.                 STG1: Mr. Montemayor will increase his average loudness on sustained 'ah' to 75 dB in two consecutive sessions.                 STG2: Mr. Montemayor will increase his average loudness on sentences to 75 dB in two consecutive sessions.     Visit #: 3/40      [x] As the licensed therapist supervising this student, I was present during the entire treatment session directing the care and reviewing the assessment plan.  I reviewed all documentation prior to signing.    (Optional) The following changes or alternations were made by the licensed therapist.             "

## 2023-06-27 ENCOUNTER — APPOINTMENT (OUTPATIENT)
Dept: ADMISSIONS | Facility: MEDICAL CENTER | Age: 78
End: 2023-06-27
Attending: NEUROLOGICAL SURGERY
Payer: MEDICARE

## 2023-06-28 ENCOUNTER — SPEECH THERAPY (OUTPATIENT)
Dept: SPEECH THERAPY | Facility: OTHER | Age: 78
End: 2023-06-28
Payer: MEDICARE

## 2023-06-28 DIAGNOSIS — R49.0 HYPOKINETIC PARKINSONIAN DYSPHONIA (HCC): ICD-10-CM

## 2023-06-28 DIAGNOSIS — G20.A1 HYPOKINETIC PARKINSONIAN DYSPHONIA (HCC): ICD-10-CM

## 2023-06-28 PROCEDURE — 99999 PR NO CHARGE: CPT | Performed by: SPEECH-LANGUAGE PATHOLOGIST

## 2023-06-29 NOTE — OP THERAPY DAILY TREATMENT
"  Outpatient Speech Therapy  DAILY TREATMENT     [facility] Speech Pathology & Audiology  [address] [place] [address]  Phone:  [phone]  Fax:  [phone]    Date: 06/28/2023    Patient: Augusto Montemayor Jr.  YOB: 1945  MRN: [id number]     Time Calculation    Start time: 1230  Stop time: 1325 Time Calculation (min): 55 minutes         Chief Complaint: Speech Therapy    Visit #: 4    Subjective Evaluation  Mr. Montemayor was seen for speech therapy services today. His wife accompanied him to the session but waited outside. He was less distracted today and was cooperative to therapeutic tasks. When distractions did occur, redirection was easily achieved with clinician prompts.     Speech Therapy Objective   1. Sustained \"ah\"              a. Average: 17 sec  b. Average: 74 dB  2. Lowest to Highest Pitch  a. Average: 393 Hz  3. Highest to Lowest Pitch              a. Average: 105 Hz  3. Word Reading   a.  Average: 73.9  dB  4. Sentence Reading   a. Average: 73 dB       Assessments    Mr. Montemayor presents with Parkinson's disease and Lewy body dementia. 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    Mr. Montemayor sustained \"ah\" for as long as possible at a comfortable pitch for an average of 17 seconds, demonstrating WNL. This is an increase from last session (14.7 seconds to 17 seconds). His average intensity for this phonation task was 74 dB, which is an additional increase from last session (70 dB to 74 dB). This is WNL since the expected intensity for this task is between 70-80 dB. Clinician modeling of the task, the verbal cue \"Louder!\" and the visual-handout depicting the words \"THINK LOUD!\" facilitated Mr. Montemayor's higher duration, intensity, and appropriate force. Reminders to drink water prevented vocal fatigue.    Mr. Montemayor's average highest pitch was 393 Hz. This is a decrease from last session " (456.9 Hz to 393 Hz). This may be due to decreased breath support during the exercise. It was noted that Mr. Montemayor often stayed in the middle of the glide followed by a slow ascent. His average lowest pitch was 105 Hz and was lower than last session (115 Hz to 105 Hz), however this is the desired outcome. The goal for these exercises is to re calibrate his perception of loudness, leading to speaking at a more typical and louder volume. This is consistent with a fundamental frequency range WNL.     Mr. Montemayor read two different lists of words at an average of 73.9 dB. He read at a fast rate that reduced his comprehensibility to 80%. Verbal cues to slow down increased his comprehensibility to 100% as the task progressed.     Mr. Montemayor read functional sentences at an average of 73 dB. This is WNL and demonstrates goal advancement. Verbal cues to slow down also occurred during the task.     Speech Therapy Plan  Mr. Montemayor will continue carry-over of 10 functional phrases in the home environment. He will return to speech therapy on 7/5/2023.     LTG: Mr. Montemayor will demonstrate carryover from therapy to his home environment as reported by his wife.                 STG1: Mr. Montemayor will increase his average loudness on sustained 'ah' to 75 dB in two consecutive sessions.                 STG2: Mr. Montemayor will increase his average loudness on sentences to 75 dB in two consecutive sessions.    Visit #: 4/40    [x] As the licensed therapist supervising this student, I was present during the entire treatment session directing the care and reviewing the assessment plan.  I reviewed all documentation prior to signing.

## 2023-06-30 ENCOUNTER — OCCUPATIONAL THERAPY (OUTPATIENT)
Dept: OCCUPATIONAL THERAPY | Facility: REHABILITATION | Age: 78
End: 2023-06-30
Attending: PSYCHIATRY & NEUROLOGY
Payer: MEDICARE

## 2023-06-30 DIAGNOSIS — F03.A0 MILD NEURODEGENERATIVE DEMENTIA (HCC): ICD-10-CM

## 2023-06-30 PROCEDURE — 97750 PHYSICAL PERFORMANCE TEST: CPT

## 2023-06-30 ASSESSMENT — BALANCE ASSESSMENTS
BALANCE - STANDING STATIC: GOOD
BALANCE - STANDING DYNAMIC: FAIR +
BALANCE - SITTING STATIC: NORMAL
BALANCE - SITTING DYNAMIC: NORMAL
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
## 2023-07-03 ENCOUNTER — PRE-ADMISSION TESTING (OUTPATIENT)
Dept: ADMISSIONS | Facility: MEDICAL CENTER | Age: 78
End: 2023-07-03
Attending: NEUROLOGICAL SURGERY
Payer: MEDICARE

## 2023-07-03 NOTE — OR NURSING
RN pre admit tele appointment complete.  Patient has pacemaker.  Pacemaker form faxed to Dr. Lott 7/3/23 at 10:28 - confirmation received.

## 2023-07-05 ENCOUNTER — SPEECH THERAPY (OUTPATIENT)
Dept: SPEECH THERAPY | Facility: OTHER | Age: 78
End: 2023-07-05
Payer: MEDICARE

## 2023-07-05 DIAGNOSIS — R49.0 HYPOKINETIC PARKINSONIAN DYSPHONIA (HCC): ICD-10-CM

## 2023-07-05 DIAGNOSIS — G20.A1 HYPOKINETIC PARKINSONIAN DYSPHONIA (HCC): ICD-10-CM

## 2023-07-05 PROCEDURE — 99999 PR NO CHARGE: CPT | Performed by: SPEECH-LANGUAGE PATHOLOGIST

## 2023-07-06 ENCOUNTER — APPOINTMENT (OUTPATIENT)
Dept: ADMISSIONS | Facility: MEDICAL CENTER | Age: 78
End: 2023-07-06
Attending: NEUROLOGICAL SURGERY
Payer: MEDICARE

## 2023-07-06 DIAGNOSIS — Z01.812 PRE-OPERATIVE LABORATORY EXAMINATION: ICD-10-CM

## 2023-07-06 LAB
ERYTHROCYTE [DISTWIDTH] IN BLOOD BY AUTOMATED COUNT: 48 FL (ref 35.9–50)
HCT VFR BLD AUTO: 42.9 % (ref 42–52)
HGB BLD-MCNC: 14.3 G/DL (ref 14–18)
INR PPP: 1.21 (ref 0.87–1.13)
MCH RBC QN AUTO: 30.7 PG (ref 27–33)
MCHC RBC AUTO-ENTMCNC: 33.3 G/DL (ref 32.3–36.5)
MCV RBC AUTO: 92.1 FL (ref 81.4–97.8)
PLATELET # BLD AUTO: 242 K/UL (ref 164–446)
PMV BLD AUTO: 9.1 FL (ref 9–12.9)
PROTHROMBIN TIME: 15.1 SEC (ref 12–14.6)
RBC # BLD AUTO: 4.66 M/UL (ref 4.7–6.1)
WBC # BLD AUTO: 9.5 K/UL (ref 4.8–10.8)

## 2023-07-06 PROCEDURE — 85027 COMPLETE CBC AUTOMATED: CPT

## 2023-07-06 PROCEDURE — 85610 PROTHROMBIN TIME: CPT

## 2023-07-06 PROCEDURE — 36415 COLL VENOUS BLD VENIPUNCTURE: CPT

## 2023-07-07 ENCOUNTER — APPOINTMENT (OUTPATIENT)
Dept: RADIOLOGY | Facility: MEDICAL CENTER | Age: 78
End: 2023-07-07
Attending: NEUROLOGICAL SURGERY
Payer: MEDICARE

## 2023-07-07 ENCOUNTER — HOSPITAL ENCOUNTER (OUTPATIENT)
Facility: MEDICAL CENTER | Age: 78
End: 2023-07-07
Attending: NEUROLOGICAL SURGERY | Admitting: NEUROLOGICAL SURGERY
Payer: MEDICARE

## 2023-07-07 VITALS
HEART RATE: 65 BPM | RESPIRATION RATE: 16 BRPM | SYSTOLIC BLOOD PRESSURE: 126 MMHG | DIASTOLIC BLOOD PRESSURE: 63 MMHG | BODY MASS INDEX: 22.63 KG/M2 | OXYGEN SATURATION: 93 % | WEIGHT: 135.8 LBS | HEIGHT: 65 IN | TEMPERATURE: 97.6 F

## 2023-07-07 DIAGNOSIS — M54.16 LUMBAR RADICULOPATHY: ICD-10-CM

## 2023-07-07 PROCEDURE — 700117 HCHG RX CONTRAST REV CODE 255: Performed by: NEUROLOGICAL SURGERY

## 2023-07-07 PROCEDURE — 77003 FLUOROGUIDE FOR SPINE INJECT: CPT

## 2023-07-07 PROCEDURE — 160002 HCHG RECOVERY MINUTES (STAT)

## 2023-07-07 PROCEDURE — 160046 HCHG PACU - 1ST 60 MINS PHASE II

## 2023-07-07 PROCEDURE — 72110 X-RAY EXAM L-2 SPINE 4/>VWS: CPT

## 2023-07-07 PROCEDURE — 160047 HCHG PACU  - EA ADDL 30 MINS PHASE II

## 2023-07-07 PROCEDURE — 62284 INJECTION FOR MYELOGRAM: CPT

## 2023-07-07 PROCEDURE — 72132 CT LUMBAR SPINE W/DYE: CPT

## 2023-07-07 RX ADMIN — IOHEXOL 10 ML: 180 INJECTION INTRAVENOUS at 14:21

## 2023-07-07 NOTE — OP THERAPY DAILY TREATMENT
"  Outpatient Speech Therapy  DAILY TREATMENT     [facility] Speech Pathology & Audiology  [address] [place] [address]  Phone:  [phone]  Fax:  [phone]    Date: 07/05/2023    Patient: Augusto Montemayor Jr.  YOB: 1945  MRN: [id number]     Time Calculation    Start time: 1230  Stop time: 1325 Time Calculation (min): 55 minutes         Chief Complaint: No chief complaint on file.    Visit #: 5    Subjective Evaluation    Mr. Montemayor arrived on time accompanied by his wife who stayed in the waiting area during the session. Mr. Montemayor was cooperative but needed redirection periodically throughout the session. Mr. Montemayor joked with the clinicians in the hallway and was in high spirits throughout the session.    Speech Therapy Objective      1. Sustained \"ah\"              a. Average: 15.5 sec  b. Average: 77 dB  2. Lowest to Highest Pitch  a. Average: 424 Hz  3. Highest to Lowest Pitch              a. Average: 90 Hz  3. Word Reading              a.  Average: 74 dB  4. Sentence Reading              a. Average: 73 dB    Assessments    Mr. Montemayor presents with Parkinson's disease and Lewy body dementia. 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    Mr. Montemayor sustained 'ah', for as long as possible, at a comfortable pitch for an average of 15.5 seconds which is WNL. This is a 5.4 second increase from the baseline data collection (10.6 sec on 6/14/23). Mr. Wests average intensity on this task was 77 dB. This is a 3 dB increase when compared to mr. Ornelas previous session (74 dB on 6/28/23) and a 16.4 dB increase when compared to the baseline data collection (60.6 dB on 6/14/23).    Mr. Montemayor's average highest pitch was 424 Hz. This is a 31 Hz increase when compared to the previous session (393 Hz on 6/28/23).     Mr. Wests average lowest pitch was 90 Hz. This is a decrease of 15 Hz when compared to the " previous session (105 Hz on 6/28/23). Mr. Montemayor needed to be instructed on how to safely start at a high pitch without damaging his vocal folds. Additionally, Mr. Montemayor occasionally needed to reminded to use the appropriate breath support when doing this exercise.    Mr. Montemayor read a list of words at an average of 74 dB. This is an increase of 14 dB when compared to the baseline data collection (50 dB on 6/14/23). Mr. Montemayor needed to be reminded to slow down his pace throughout this exercise.     Mr. Montemayor read functional sentences at an average of 73 dB. This is equal to the previous session and indicates maintains of skills across sessions. Mr. Montemayor needed to be reminded to slow down his pace throughout this exercise.     Speech Therapy Plan    Twan Montemayor will be seen again on 7/12/23. Mr. Montemayor will continue to practice the intensive respiratory program.     Visit #: 5/40    LTG: Mr. Montemayor will demonstrate carryover from therapy to his home environment as reported by his wife.                 STG1: Mr. Montemayor will increase his average loudness on sustained 'ah' to 75 dB in two consecutive sessions.                 STG2: Mr. Montemayor will increase his average loudness on sentences to 75 dB in two consecutive sessions.    [x] As the licensed therapist supervising this student, I was present during the entire treatment session directing the care and reviewing the assessment plan.  I reviewed all documentation prior to signing.

## 2023-07-07 NOTE — DISCHARGE INSTRUCTIONS
Lumbar Puncture, Care After  The following information offers guidance on how to care for yourself after your procedure. Your health care provider may also give you more specific instructions. If you have problems or questions, contact your health care provider.  What can I expect after the procedure?  After the procedure, it is common to have:  Mild discomfort or pain at the puncture site.  A mild headache that is relieved with pain medicines.  Follow these instructions at home:  Activity  Lie down flat or rest for as long as told by your health care provider.  You may have to avoid lifting. Ask your health care provider how much you can safely lift.  If you were given a sedative during the procedure, it can affect you for several hours. Do not drive or operate machinery until your health care provider says that it is safe.  Return to your normal activities as told by your health care provider. Ask your health care provider what activities are safe for you.  Puncture site care  follow instructions from your health care provider about how to care for your puncture site. Make sure you:  Wash your hands with soap and water for at least 20 seconds before and after you change your bandage (dressing). If soap and water are not available, use hand .  Change or remove your dressing as told by your health care provider.  Check your puncture site every day for signs of infection. Check for:  More pain.  Redness or swelling.  Fluid or blood.  Warmth.  Pus or a bad smell.  General instructions  Take over-the-counter and prescription medicines only as told by your health care provider.  Drink enough fluid to keep your urine pale yellow. Your health care provider may recommend drinking caffeine to prevent a headache.  Keep all follow-up visits. This is important.  Contact a health care provider if:  You have redness, swelling, or more pain around your puncture site.  You have fluid or blood coming from your puncture  site.  Your puncture site feels warm to the touch.  You have pus or a bad smell coming from your puncture site.  You have fever or chills.  You have nausea or vomiting.  You have a headache that lasts for more than 2 days or does not get better with medicine.  Get help right away if:  You develop any of the following in your legs:  Weakness.  Numbness.  Tingling.  You are unable to control when you urinate or have a bowel movement (incontinence).  You are dizzy or you feel like you might faint.  You have a severe headache, especially when you sit or stand.  Summary  A lumbar puncture is a procedure in which a small needle is inserted into the lower back to remove fluid that surrounds the brain and spinal cord.  After this procedure, it is common to have a headache and pain around the needle insertion area.  Lying flat, staying hydrated, and drinking caffeine can help prevent headaches.  Monitor your needle insertion site for signs of infection, including warmth, fluid, or more pain.  Get help right away if you develop leg weakness, leg numbness, incontinence, or severe headaches.  This information is not intended to replace advice given to you by your health care provider. Make sure you discuss any questions you have with your health care provider.  Document Revised: 08/22/2022 Document Reviewed: 08/22/2022  [employer] Patient Education © 2023 [employer]If any questions arise, call your provider.  If your provider is not available, please feel free to call the [facility] at [phone].    MEDICATIONS: Resume taking daily medication.  Take prescribed pain medication with food.  If no medication is prescribed, you may take non-aspirin pain medication if needed.  PAIN MEDICATION CAN BE VERY CONSTIPATING.  Take a stool softener or laxative such as senokot, pericolace, or milk of magnesia if needed.    Last pain medication given: none given post procedure    Diet    Resume pre-operative diet upon discharge from  the hospital. Depending on how you are feeling and whether you have nausea or not, you may wish to stay with a bland diet for the first few days. However, you can advance your diet as quickly as you feel ready.    Activity    Resume Your Normal Activity    You may resume your normal activity as tolerated.  Rest as needed.      No Heavy Lifting    No Strenuous Activity    No Driving or Heavy Equipment for 24 hours

## 2023-07-08 NOTE — OR NURSING
1445 Report received from [employer] and patient arrived to phase 2.      1500 Vital signs stable. Pain level 0/10, no complaints of n/v.  Band-Aid dressing to low back c/d/i. Neuro intact. + void in urinal    1645 Bed rest completed.      1700 Patient states ready to go home.  VSS, patient has all belongings. Patient got dressed with wife.  Discharge instructions discussed with patient and wife.  Questions answered. Patient and family verbalized understanding.  Patient taken out with wheelchair and all belongings.

## 2023-07-12 ENCOUNTER — SPEECH THERAPY (OUTPATIENT)
Dept: SPEECH THERAPY | Facility: OTHER | Age: 78
End: 2023-07-12
Payer: MEDICARE

## 2023-07-12 DIAGNOSIS — G20.A1 HYPOKINETIC PARKINSONIAN DYSPHONIA (HCC): ICD-10-CM

## 2023-07-12 DIAGNOSIS — R49.0 HYPOKINETIC PARKINSONIAN DYSPHONIA (HCC): ICD-10-CM

## 2023-07-12 PROCEDURE — 99999 PR NO CHARGE: CPT | Performed by: SPEECH-LANGUAGE PATHOLOGIST

## 2023-07-13 NOTE — OP THERAPY DAILY TREATMENT
"  Outpatient Speech Therapy  DAILY TREATMENT     [facility] Speech Pathology & Audiology  [address] [place] [address]  Phone:  [phone]  Fax:  [phone]    Date: 07/12/2023    Patient: Augusto Montemayor Jr.  YOB: 1945  MRN: [id number]     Time Calculation    Start time: 1230  Stop time: 1325 Time Calculation (min): 55 minutes         Chief Complaint: No chief complaint on file.    Visit #: 6    Subjective Evaluation    Mr. Montemayor arrived on time accompanied by his wife who stayed in the waiting area during the session. Mr. Montemayor complained of feeling stiff and tired after working in his garage yesterday. Mr. Montemayor was notably quieter in conversation compared to past sessions requiring increased cues for participation.     Speech Therapy Objective     1. Sustained \"ah\"              a. Average: 16.6 sec  b. Average: 77 dB  2. Lowest to Highest Pitch  a. Average: 304 Hz  3. Highest to Lowest Pitch              a. Average: 138 Hz  3. Word Reading              a.  Average: 77 dB  4. Sentence Reading              a. Average: 76 dB  Assessments    Mr. Montemayor presents with Parkinson's disease and Lewy body dementia. 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    Mr. Montemayor sustained 'ah', for as long as possible, at a comfortable pitch for an average of 16.6 seconds which is WNL. This is a 6 second increase from the baseline data collection (10.6 sc on 6/14/23). Mr. Wests average intensity on this task was 77 dB. This is a 16.4 dB increase when compared to the baseline data collection (60.6 dB on 6/14/23).    Mr. Montemayor's average highest pitch was 304 Hz. This is a 102 Hz decrease when compared to the baseline data collection (6/14/23). This drop in progress could be attributed to Mr. Montemayor's fatigue from working the previous day.    Mr. Wests average lowest pitch was 138 Hz. This is a 5.2 Hz increase " compared to Mr. Montemayor baseline data collection (132.8 Hz on 6/14/23). This drop in progress could be attributed to Mr. Montemayor's fatigue from working the previous day.    Mr. Montemayor read a list of words at an average of 77 dB. This is an increase of 17 dB when compared to the baseline data collection (50 dB on 6/14/23). Mr. Montemayor is ready to work on maintaining this intensity at the phrase level in future session.    Mr. Montemayor read functional sentences at an average of 76 dB. This is a 3 dB increase when compared to the previous two sessions (73 dB on 7/5/23 and  6/28/23).       Speech Therapy Plan    Twan Montemayor will be seen again on 7/19/23. Mr. Montemayor will continue to practice the intensive respiratory program.     LTG: Mr. Montemayor will demonstrate carryover from therapy to his home environment as reported by his wife.                 STG1: Mr. Montemayor will increase his average loudness on sustained 'ah' to 75 dB in two consecutive sessions.                 STG2: Mr. Montemayor will increase his average loudness on sentences to 75 dB in two consecutive sessions.    [x] As the licensed therapist supervising this student, I was present during the entire treatment session directing the care and reviewing the assessment plan.  I reviewed all documentation prior to signing.

## 2023-07-19 ENCOUNTER — SPEECH THERAPY (OUTPATIENT)
Dept: SPEECH THERAPY | Facility: OTHER | Age: 78
End: 2023-07-19
Payer: MEDICARE

## 2023-07-19 DIAGNOSIS — R49.0 HYPOKINETIC PARKINSONIAN DYSPHONIA (HCC): ICD-10-CM

## 2023-07-19 DIAGNOSIS — G20.A1 HYPOKINETIC PARKINSONIAN DYSPHONIA (HCC): ICD-10-CM

## 2023-07-19 PROCEDURE — 92507 TX SP LANG VOICE COMM INDIV: CPT | Mod: GN,GC,KX | Performed by: SPEECH-LANGUAGE PATHOLOGIST

## 2023-07-20 NOTE — OP THERAPY DAILY TREATMENT
"  Outpatient Speech Therapy  DAILY TREATMENT     [facility] Speech Pathology & Audiology  [address] [place] [address]  Phone:  [phone]  Fax:  [phone]    Date: 07/19/2023    Patient: Augusto Montemayor Jr.  YOB: 1945  MRN: [id number]     Time Calculation    Start time: 1230  Stop time: 1325 Time Calculation (min): 55 minutes         Chief Complaint: Speech Therapy    Visit #: 7    Subjective Evaluation  Mr. Montemayor was seen for speech therapy services today. He arrived on time accompanied by his wife who remained in the waiting room for the entirety of the session. Mr. Montemayor reported feeling a sudden onset of increased hearing loss and persistent tinnitus today. He stated, \"It feels like there is water in my ears\". Mr. Montemayor was notably quieter during spontaneous conversation than previous sessions, requiring reminders to increase his volume.     Speech Therapy Objective   1. Sustained \"ah\"   a. Average: 18 sec    b. Average: 77 dB  2. Lowest to Highest Pitch   a. Average: 326 Hz  3. Highest to Lowest Pitch   a. Average: 110 Hz  4. Functional Sentences Reading    a. Average: 76.6 dB  5. Phrase Reading    a. Average: 76 dB    Assessments    Mr. Montemayor presents with Parkinson's disease and Lewy body dementia. 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     Mr. Montemayor sustained \"ah\" for as long as possible at a comfortable pitch for an average of 18 seconds, demonstrating WNL. This is a 1.4 dB increase from the previous session (16.6 seconds to 18 seconds). Mr. Montemayor's average intensity on this task was 77 dB, which is consistent with the previous session. This is WNL as the expected intensity for this task is between 70-80 dB. Clinician modeling, the verbal cue, \"Louder!\", and visual-handout consisting of the words \"THINK LOUD!\" facilitated Mr. Montemayor's increased loudness and intensity. Reminders to " "drink water occurred after 3 sustained phonations.     Mr. Montemayor's average highest pitch was 326 Hz. This is a 22 dB increase from previous session (304 Hz to 326 Hz). It was noted that Mr. Montemayor often began this task with a rough, harsh voice. Verbal cues to utilize an easier onset resulted in a softer start and higher pitch.     Mr. Montemayor's average lowest pitch was 110 Hz. This is a 28 dB decrease from previous session (138 Hz to 110 Hz). Clinician cues to cut off his voice when it sounded \"gravelly\" and rough when progressing from the high to low pitch facilitated increased vocal quality.     Mr. Montemayor read functional sentences at an average of 76.6 dB. This is consistent with the previous session. Clinician verbal cues to increase his loudness occurred throughout the task.     Mr. Montemayor progressed from the word to phrase level today for his reading material. He maintaining his intensity and read phrases at an average of 76 dB. This is a slight decrease from last session (77 to 76 dB), although this may be attributed to an increase in complexity.     Speech Therapy Plan  Mr. Montemayor will return to therapy on 7/26/2023. He will continue with the intensive respiratory program.     Visit #: 7/40    [x] As the licensed therapist supervising this student, I was present during the entire treatment session directing the care and reviewing the assessment plan.  I reviewed all documentation prior to signing.            "

## 2023-07-26 ENCOUNTER — SPEECH THERAPY (OUTPATIENT)
Dept: SPEECH THERAPY | Facility: OTHER | Age: 78
End: 2023-07-26
Payer: MEDICARE

## 2023-07-26 DIAGNOSIS — G20.A1 HYPOKINETIC PARKINSONIAN DYSPHONIA (HCC): ICD-10-CM

## 2023-07-26 DIAGNOSIS — R49.0 HYPOKINETIC PARKINSONIAN DYSPHONIA (HCC): ICD-10-CM

## 2023-07-26 PROCEDURE — 99999 PR NO CHARGE: CPT | Performed by: SPEECH-LANGUAGE PATHOLOGIST

## 2023-07-27 NOTE — OP THERAPY DAILY TREATMENT
"  Outpatient Speech Therapy  DAILY TREATMENT     [facility] Speech Pathology & Audiology  [address] [place] [address]  Phone:  [phone]  Fax:  [phone]    Date: 07/26/2023    Patient: Augusto Montemayor Jr.  YOB: 1945  MRN: [id number]     Time Calculation    Start time: 1230  Stop time: 1325 Time Calculation (min): 55 minutes         Chief Complaint: Speech Therapy    Visit #: 8    Subjective Evaluation  Mr. Montemayor was seen for speech therapy services today. He arrived on time accompanied by his wife who remained in the waiting room for the entirety of the session. Mr. Montemayor reported worsened hearing and tinnitus when compared to previous session. He stated, \"I have to try extra hard to hear you today\". Mr. Montemayor demonstrated notable carry-over outside the therapeutic clinic environment.     Speech Therapy Objective   1. Sustained \"ah\"   a. Average: 18 sec   b. Average: 78 dB  2. Lowest to Highest Pitch   a. Average: 349 Hz  3. Highest to Lowest Pitch   a. Average: 106.6 Hz  4. Functional Sentences Reading   a. Average: 77 dB  5. Phrase Reading   a. Average: 76 dB    Assessments    Mr. Montemayor presents with Parkinson's disease and Lewy body dementia. 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      Mr. Montemayor sustained \"ah\" for as long as possible at a comfortable pitch for an average of 18 seconds, demonstrating WNL. This is consistent from previous session. Mr. Wests average intensity on this task was 78 dB, which is a 1 dB increase from previous session (77 dB to 78 dB). This is WNL as the expected intensity for this task is between 70-80 dB. The verbal cue, \"louder!\", visual-handout depicting the word, \"THINK LOUD!\" and clinician modeling facilitated Mr. Montemayor's loudness and intensity. Cues to drink water occurred following 3 sustained phonations.     Mr. Montemayor's average highest pitch was 349 " Hz. This is a 23 dB increase from previous session (326 Hz to 349 Hz). It was noted that Mr. Montemayor began this task with a strained voice. Verbal cues and clinician modeling to utilize an easier onset and cut it off when it wavered resulted in a softer start and improved vocal quality.     Mr. Montemayor's average lowest pitch was 106.6 Hz. This is a 3.4 dB decrease from previous session (110 Hz to 106.6 Hz). Clinician cues to cut off his voice when it sounded fatigued resulted in an smoother and more efficient vocal range.     Mr. Montemayor read functional sentences at an average of 77 dB. This is a 0.4 increase from previous session. Clinician verbal cue to increase loudness transpired throughout the task.     Mr. Montemayor read phrases at an average of 76 dB. This is consistent from previous session. Clinician cues to increase loudness occurred throughout the task. Mr. Montemayor sounded increasingly more natural and enthusiastic compared to the beginning of the summer treatment period.     Speech Therapy Plan  Mr. Montemayor will return to therapy on 8/2/2023. He will continue the intensive respiratory program. Visit 8/40.     [x] As the licensed therapist supervising this student, I was present during the entire treatment session directing the care and reviewing the assessment plan.  I reviewed all documentation prior to signing.

## 2023-08-02 ENCOUNTER — SPEECH THERAPY (OUTPATIENT)
Dept: SPEECH THERAPY | Facility: OTHER | Age: 78
End: 2023-08-02
Payer: MEDICARE

## 2023-08-02 DIAGNOSIS — R49.0 HYPOKINETIC PARKINSONIAN DYSPHONIA (HCC): ICD-10-CM

## 2023-08-02 DIAGNOSIS — G20.A1 HYPOKINETIC PARKINSONIAN DYSPHONIA (HCC): ICD-10-CM

## 2023-08-02 PROCEDURE — 92507 TX SP LANG VOICE COMM INDIV: CPT | Mod: GN,GC | Performed by: SPEECH-LANGUAGE PATHOLOGIST

## 2023-08-03 NOTE — OP THERAPY PROGRESS SUMMARY
"  Outpatient Speech Therapy  PROGRESS SUMMARY NOTE      [facility] Speech Pathology & Audiology  [address] [facility] [address]  Phone:  [phone]  Fax:  [phone]    Date of Visit: 08/02/2023    Patient: Augusto Montemayor Jr.  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 Hypokinetic Parkinsonian dysphonia (HCC)  G20    R49.0       Subjective Evaluation  The note below serves as both a record of service provided on 08/02/23 (see \"current data\" gathered on this date of service), and also a record of progress across the last 8-week period.Mr. Montemayor has excellent attendance (8/8 scheduled sessions attended) to speech therapy. Mr. Montemayor arrived on time today accompanied by his wife who remained in the waiting room for the entirety of the session. Mr. Montemayor reported improved hearing when compared to the previous two sessions. This may be a result of wearing his hearing aids today.      Speech Therapy Objective  Voice Intervention     Long-Term Goal: Mr. Montemayor will demonstrate carryover from therapy to his home environment as reported by his wife.     Short-Term Goal 1: Mr. Montemayor will increase his average loudness on sustained 'ah' to 75 dB in two consecutive sessions.  Baseline Data 6/14/23: Mr. Montemayor sustained \"ah\", for as long as possible at a comfortable pitch for an average of 10.6 seconds with an intensity of 60.6 dB.   Current Data 8/02/23: Mr. Montemayor sustained \"ah\", for as long as possible at a comfortable pitch for an average of 19.2 seconds with an intensity of 80 dB.   Status: Mr." " has demonstrated significant goal advancement within this area. Baseline data detailed Mr. Montemayor's average intensity of 60.6 dB on sustained \"ah\". On 6/21/23, he achieved 70 dB, which is WNL since the expected intensity for this task is between 70-80 dB.  On 7/05/23, he increased his loudness during the task to 77 db. Since that session, he has continued to increase his intensity. Today marks the second session in a row where he achieved 78 dB on sustained \"ah\". This demonstrates excellent goal advancement and a 17.4 dB increase from baseline (60.6 dB to 78 dB). Mr. Montemayor benefits from the verbal cue, \"louder!\", visual-handout depicting the words, \"THINK LOUD!\", and clinician modeling. Cues to drink water occur following 3 sustained phonations.   Goal met.     Short-Term Goal 2: Mr. Montemayor will increase his average loudness on sentences to 75 dB in two consecutive sessions.  Baseline Data 6/28/23: Mr. Montemayor read functional sentences at an average of 73 dB.   Current Data 8/02/23: Mr. Montemayor read functional sentences at an average of 78 dB.   Status: Mr. Montemayor has demonstrated consistent progression with this goal as evidenced by his notable achievements. Baseline data detailed Mr. Montemayor's average loudness on sentences at an average of 73 dB. On  7/12/23, he increased his loudness during the task to 76 dB and maintained this intensity for the following three sessions. Today was the third continuous session where Mr. Montemayor achieved at least 77 dB on functional sentences. He read these sentences today at his highest intensity yet, at 78 dB. This marks a 5 dB increase when compared to baseline data (73 to 78 dB). Throughout the treatment period, Mr. Montemayor was frequently told to slow down his rate of speech during this task and speak louder, resulting in increased comprehensibility and intensity.   Goal met.     Assessments    Mr. Montemayor presents with Parkinson's disease and Lewy body dementia. Mr. Montemayor has a diagnosis of mild " hypokinetic dysarthria characterized by weak breathy voice, low volume, and variable rate of speech.      Speech Therapy Plan :   Prognosis:  Good  Goals  Short Term Goals:  STM #3 Mr. Montemayor will increase his average loudness in conversation to 75-80 dB in two consecutive sessions.     STM #4 Mr. Montemayor will maintain his average loudness at 80 dB during sustained phonation and simultaneous cognitive tasks.     STM#5 Mr. Montemayor will demonstrate carryover of taught strategies in a group session with 90% accuracy and minimal verbal cueing.     Short Term Goal Duration (Weeks):  2-4 months  Long Term Goals:  LTG #1 Mr. Montemayor will demonstrate carryover from therapy to his home environment as reported by his wife.    Long Term Goal Duration (Weeks):  4-6 months  Therapy Recommendations  Recommendation:  Individual Speech Therapy,  Frequency:  2x week  Duration (in visits):  20    It is recommended at this time that Mr. Montemayor attend a more intensive respiratory program 2x a week during the Fall 2023 treatment period if scheduling allows. In addition, group therapy is recommended. This will benefit Mr. Montemayor greatly by providing increased opportunities for social interaction, carry-over of vocal techniques in various environments, and support from others diagnosed with Parkinson's. Visit 9/20.       Referring provider co-signature:  I have reviewed this plan of care and my co-signature certifies the need for services.    Certification Period: 08/02/2023 to 4/18/2024    Physician Signature: ________________________________ Date: ______________       [x] As the licensed therapist supervising this student, I was present during the entire treatment session directing the care and reviewing the assessment plan.  I reviewed all documentation prior to signing.

## 2023-09-06 ENCOUNTER — SPEECH THERAPY (OUTPATIENT)
Dept: SPEECH THERAPY | Facility: OTHER | Age: 78
End: 2023-09-06
Payer: MEDICARE

## 2023-09-06 DIAGNOSIS — G20.A1 HYPOKINETIC PARKINSONIAN DYSPHONIA (HCC): ICD-10-CM

## 2023-09-06 DIAGNOSIS — R49.0 HYPOKINETIC PARKINSONIAN DYSPHONIA (HCC): ICD-10-CM

## 2023-09-06 PROCEDURE — 92508 TX SP LANG VOICE COMM GROUP: CPT | Mod: GN,GC | Performed by: SPEECH-LANGUAGE PATHOLOGIST

## 2023-09-06 NOTE — OP THERAPY DAILY TREATMENT
Outpatient Speech Therapy  DAILY TREATMENT     [facility] Speech Pathology & Audiology  [address] [facility] [address]  Phone:  [phone]  Fax:  [phone]    Date: 09/06/2023    Patient: Augusto Montemayor Jr.  YOB: 1945  MRN: [id number]     Time Calculation    Start time: 1000  Stop time: 1055 Time Calculation (min): 55 minutes         Chief Complaint: Speech Therapy    Visit #: 10    Subjective Evaluation  Mr. Montemayor arrived to his therapy session on time accompanied by his wife, who remained in the waiting room for the entirety of the session. Mr. Montemayor met the other group therapy client as well as the new clinicians. He stated he enjoyed sitting back and observing the conversations during the session but he did participate when asked.    Speech Therapy Objective   1. Cues Required  Mr. Montemayor participated in group conversation with clinicians and fellow group member. He required 8 verbal and visual cues in conversation from his communciation partner to increase his loudness and indicate his message. He required moderate verbal cues from the clinician to utilize taught strategies from the summer treatment period. Mr. Montemayor participated in a 50 minute conversation suggesting increased endurance over longer periods of time.      Assessments  Mr. Montemayor presents with Parkinson's disease and Lewy body dementia. 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      Mr. Montemayor participated in a group session to generalize strategies learned in previous 1:1 therapy and to provide natural conversational cues. Clinicians facilitated natural conversation through asking both Mr. Montemayor and the other client about themselves. Mr. Montemayor independently talked about recognizing other group member's facial expressions (scrunched eyebrows, leaning forward) to modulate his loudness.     Speech Therapy Plan  Mr. Montemayor will  return to group therapy on 9/13/23. Mr. Montemayor will continue to demonstrate carryover of previous taught strategies in group sessions. Visit 10/20.    PROGRESS SUMMARY NOTE    Please refer to progress note from 8/2/23 as this was Mr. Montemayor's first session of this semester. Updated information is not yet relevant. Please see above for objective data     [x] As the licensed therapist supervising this student, I was present during the entire treatment session directing the care and reviewing the assessment plan.  I reviewed all documentation prior to signing.

## 2023-09-13 ENCOUNTER — SPEECH THERAPY (OUTPATIENT)
Dept: SPEECH THERAPY | Facility: OTHER | Age: 78
End: 2023-09-13
Payer: MEDICARE

## 2023-09-13 DIAGNOSIS — R49.0 HYPOKINETIC PARKINSONIAN DYSPHONIA (HCC): ICD-10-CM

## 2023-09-13 DIAGNOSIS — G20.A1 HYPOKINETIC PARKINSONIAN DYSPHONIA (HCC): ICD-10-CM

## 2023-09-13 PROCEDURE — 92508 TX SP LANG VOICE COMM GROUP: CPT | Mod: GN,GC | Performed by: SPEECH-LANGUAGE PATHOLOGIST

## 2023-09-15 NOTE — OP THERAPY DAILY TREATMENT
"  Outpatient Speech Therapy  DAILY TREATMENT     [facility] Speech Pathology & Audiology  [address] [facility] [address]  Phone:  [phone]  Fax:  [phone]    Date: 09/13/2023    Patient: Augusto Montemayor Jr.  YOB: 1945  MRN: [id number]     Time Calculation    Start time: 1000  Stop time: 1055 Time Calculation (min): 55 minutes         Chief Complaint: Speech Therapy    Visit #: 11    Subjective Evaluation  Augusto arrived on time to his therapy session accompanied by his wife, who remained in the waiting room. Augusto was more outgoing this session and engaged in multiple conversations with the clinicians as well as his group partner. At the end of the session, he let the clinicians know he often could not hear during the session and was becoming confused. Clinicians encouraged Augusto to advocate to others that he was becoming confused and ask others to repeat themselves if needed.    Speech Therapy Objective   1. Cues from conversation partners  Augusto participated in a 55 minute conversation with his group partner and clinicians. During this conversation, he needed 13 visual and verbal prompts from the clinician to increase his loudness to perceptible limits for his conversational partners. He also was cued three times by his group partner when they indicated they could not hear him (visually or verbally, \"What did you say?\").     2. dB level in conversation  Several dB readings were taken to determine Augusto's average conversational volume. Augusto achieved the following:    Trial 1 40 dB   Trial 2 56 dB   Trial 3 57 dB   Trial 4 46 dB   Average 49 dB       Assessments  Mr. Montemayor presents with Parkinson's disease and Lewy body dementia. 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    Speech Therapy Plan  Augusto will return to speech therapy on 9/20/2023 for the Parkinson's group. Visual reminders to " increase his loudness will be utilized to help Augusto independently monitor his dB.     Visit # 11/20.    [x] As the licensed therapist supervising this student, I was present during the entire treatment session directing the care and reviewing the assessment plan.  I reviewed all documentation prior to signing.

## 2023-09-20 ENCOUNTER — SPEECH THERAPY (OUTPATIENT)
Dept: SPEECH THERAPY | Facility: OTHER | Age: 78
End: 2023-09-20
Payer: MEDICARE

## 2023-09-20 DIAGNOSIS — G20.A1 HYPOKINETIC PARKINSONIAN DYSPHONIA (HCC): ICD-10-CM

## 2023-09-20 DIAGNOSIS — R49.0 HYPOKINETIC PARKINSONIAN DYSPHONIA (HCC): ICD-10-CM

## 2023-09-20 PROCEDURE — 92508 TX SP LANG VOICE COMM GROUP: CPT | Mod: GN,GC | Performed by: SPEECH-LANGUAGE PATHOLOGIST

## 2023-09-21 NOTE — OP THERAPY DAILY TREATMENT
"  Outpatient Speech Therapy  DAILY TREATMENT     [facility] Speech Pathology & Audiology  [address] [place] [address]  Phone:  [phone]  Fax:  [phone]    Date: 09/20/2023    Patient: Augusto Montemayor Jr.  YOB: 1945  MRN: [id number]     Time Calculation    Start time: 1000  Stop time: 1055 Time Calculation (min): 55 minutes         Chief Complaint: Speech Therapy    Visit #: 12    Subjective Evaluation  Augusto arrived on time to his therapy session accompanied by his wife, who remained in the waiting room. Augusto was alert and participating the entire duration of the session.    Speech Therapy Objective   1. Cues from conversation partners  Augusto participated in a 55 minute conversation with his group partner and clinicians. During the conversation, he needed one verbal prompt and two visual prompts (pointing up) from the clinicians in the room to increase his loudness to perceptible limits for his conversational partners. He also was cued three times by his group partner to increase his loudness when they indicated they could not hear him (visually or verbally, \"What did you say?\"). Visual prompts of a dB meter placed within Augusto's eyeline and a visual showing his goal dB (75-80 dB) were also utilized.     2. dB level in conversation  Several dB readings were taken to determine Augusto's average conversational volume. Augusto achieved the following:    Trial 1 70 dB   Trial 2 61 dB   Trial 3 64 dB   Trial 4 76 dB   Average 67 dB         Assessments  Mr. Montemayor presents with Parkinson's disease and Lewy body dementia. 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    Speech Therapy Plan  Augusto will return to speech therapy on 10/4/23 for the Parkinson's group. Visual reminders to increase his loudness will continue to be utilized to help Augusto independently monitor his dB.     Visit # 12/20.    [x] As " the licensed therapist supervising this student, I was present during the entire treatment session directing the care and reviewing the assessment plan.  I reviewed all documentation prior to signing.

## 2023-10-04 ENCOUNTER — SPEECH THERAPY (OUTPATIENT)
Dept: SPEECH THERAPY | Facility: OTHER | Age: 78
End: 2023-10-04
Payer: MEDICARE

## 2023-10-04 DIAGNOSIS — G20.A1 HYPOKINETIC PARKINSONIAN DYSPHONIA (HCC): ICD-10-CM

## 2023-10-04 DIAGNOSIS — R49.0 HYPOKINETIC PARKINSONIAN DYSPHONIA (HCC): ICD-10-CM

## 2023-10-04 PROCEDURE — 92508 TX SP LANG VOICE COMM GROUP: CPT | Mod: GN,GC,KX | Performed by: SPEECH-LANGUAGE PATHOLOGIST

## 2023-10-04 NOTE — OP THERAPY DAILY TREATMENT
"  Outpatient Speech Therapy  DAILY TREATMENT     [facility] Speech Pathology & Audiology  [address] [place] [address]  Phone:  [phone]  Fax:  [phone]    Date: 10/04/2023    Patient: Augusto Montemayor Jr.  YOB: 1945  MRN: [id number]     Time Calculation    Start time: 1000  Stop time: 1058 Time Calculation (min): 58 minutes         Chief Complaint: Speech Therapy    Visit #: 13    Subjective Evaluation  Augusto arrived on time to his session accompanied by his wife, who remained in the waiting room for the entire duration of the session. Augusto was talkative during the session and asked his communication partner several questions as well as telling him several jokes. Several times Augusto was cut off by the other client in the session when expressing his thoughts. After the session Augusto expressed that he sometimes gets frustrated when the other client interrupts him. Clinicians encouraged him to self advocate if this happens in the future.     Speech Therapy Objective   1. Cues from conversation partners  Augusto participated in a 55 minute conversation with his group partner and clinicians. During the conversation, he needed one verbal prompt (\"Can you be a bit louder?\") and four visual prompts (pointing up) from the clinicians in the room. He was also cued three times by his group partner (visually or verbally, \"What did you say?\"). Two visual prompts were referenced throughout the session; a dB meter and a loudness thermometer showing his goal loudness (75-80 dB).     2. dB level in conversation  Several dB reading were taken to determine Augusto's average conversational volume. He achieved the following:     Trial 1 65 dB Trial 7 68 dB   Trial 2 59 dB Trial 8 66 dB   Trail 3 69 dB Trial 9 58 dB   Trial 4 65 dB Trial 10 65 dB   Trial 5 69 dB Trial 11 69 dB   Trial 6 57 dB Average: 59 dB     Assessments  Mr. Montemayor presents with Parkinson's disease and Lewy body dementia. Mr. Montemayor has a " diagnosis of mild hypokinetic dysarthria characterized by weak breathy voice, low volume, and variable rate of speech.    Speech Therapy Plan  Augusto will return to speech therapy on 10/11/23 for the Parkinson's group. Visual reminders to increase his loudness will continue to be utilized to help Augusto independently monitor his dB.Self advocacy skills will be encouraged.     Visit # 13/20    [x] As the licensed therapist supervising this student, I was present during the entire treatment session directing the care and reviewing the assessment plan.  I reviewed all documentation prior to signing.

## 2023-10-11 ENCOUNTER — SPEECH THERAPY (OUTPATIENT)
Dept: SPEECH THERAPY | Facility: OTHER | Age: 78
End: 2023-10-11
Payer: MEDICARE

## 2023-10-11 DIAGNOSIS — R49.0 HYPOKINETIC PARKINSONIAN DYSPHONIA (HCC): ICD-10-CM

## 2023-10-11 DIAGNOSIS — G20.A1 HYPOKINETIC PARKINSONIAN DYSPHONIA (HCC): ICD-10-CM

## 2023-10-11 PROCEDURE — 92507 TX SP LANG VOICE COMM INDIV: CPT | Mod: GN,GC,KX | Performed by: SPEECH-LANGUAGE PATHOLOGIST

## 2023-10-12 NOTE — OP THERAPY DAILY TREATMENT
"  Outpatient Speech Therapy  DAILY TREATMENT     [facility] Speech Pathology & Audiology  [address] [place] [address]  Phone:  [phone]  Fax:  [phone]    Date: 10/11/2023    Patient: Augusto Montemayor Jr.  YOB: 1945  MRN: [id number]     Time Calculation    Start time: 1000  Stop time: 1100 Time Calculation (min): 60 minutes         Chief Complaint: No chief complaint on file.    Visit #: 14    Subjective Evaluation  Augusto arrived on time to his session accompanied by his wife, who remained in the waiting room for the entire duration of the session. Augusto was the only client in the session. He was very talkative and he shared multiple stories about his personal life, specifically his family. When asked to drink water to ensure he doesn't get dehydrated during the session, Augusto reported he has been having issues with his swallowing and doesn't drink much water anymore due to these issues. He stated that he must focus when he is drinking liquids or else he will choke.     Speech Therapy Objective   1. Cues from conversation partners  Augusto participated in a 55 minute conversation with the clinicians in the room. During the conversation, he needed two verbal prompts (\"Can you be a bit louder?\" Or \"What did you say?\") and three visual prompts (hand beside the ear) from the clinicians in the room. He cued a clinician in the room twice when he could not her them by saying, \"What did you say?\". Two visual prompts were referenced throughout the session; a dB meter and a loudness thermometer showing his goal loudness (75-80 dB).     2. dB level in conversation  Several dB reading were taken to determine Augusto's average conversational volume. He achieved the following:      Trial 1 70 dB Trial 8 68 dB   Trial 2 65 dB Trial 9 59 dB   Trail 3 63 dB Trial 10 62 dB   Trial 4 63 dB Trial 11 64 dB   Trial 5 67 dB Trial 12 64 dB   Trial 6 68 dB Trial 13 53 dB   Trial 7 62 dB Average:  63 dB " "      Assessments  Augusto presents with Parkinson's disease and Lewy body dementia. 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    Augusto was more self aware of his loudness during the session. Multiple times he recognized that his voice was lowering in dB and he spontaneously increased his loudness without cues from the clinicians. He also advocated for himself when he could not hear what a clinician said by saying, \"What did you say?\" This is an improvement from last session as he typically will not attempt to clarify information if he does not hear it the first time.    Speech Therapy Plan  Augusto will return to speech therapy on 10/18/23. Visual reminders to increase his loudness will continue to be utilized to help Augusto independently monitor his dB. Self advocacy skills will continue to be encouraged. A referral for a swallow study will be placed to further investigate Augusto's swallowing and rule out possible physiological problems associated with his swallowing mechanism.    Visit # 14/20    [x] As the licensed therapist supervising this student, I was present during the entire treatment session directing the care and reviewing the assessment plan.  I reviewed all documentation prior to signing.          "

## 2023-10-18 ENCOUNTER — SPEECH THERAPY (OUTPATIENT)
Dept: SPEECH THERAPY | Facility: OTHER | Age: 78
End: 2023-10-18
Payer: MEDICARE

## 2023-10-18 DIAGNOSIS — G20.A1 HYPOKINETIC PARKINSONIAN DYSPHONIA (HCC): ICD-10-CM

## 2023-10-18 DIAGNOSIS — R49.0 HYPOKINETIC PARKINSONIAN DYSPHONIA (HCC): ICD-10-CM

## 2023-10-18 PROCEDURE — 92508 TX SP LANG VOICE COMM GROUP: CPT | Mod: GN,GC,KX | Performed by: SPEECH-LANGUAGE PATHOLOGIST

## 2023-10-19 NOTE — OP THERAPY DAILY TREATMENT
"  Outpatient Speech Therapy  DAILY TREATMENT     [facility] Speech Pathology & Audiology  [address] [place] [address]  Phone:  [phone]  Fax:  [phone]    Date: 10/18/2023    Patient: Augusto Montemayor Jr.  YOB: 1945  MRN: [id number]     Time Calculation    Start time: 1003  Stop time: 1055 Time Calculation (min): 52 minutes         Chief Complaint: Speech Therapy    Visit #: 15    Subjective Evaluation  Augusto a few minutes late to his session accompanied by his wife, who remained in the waiting room for the entire duration of the session. Augusto was the only client in the session today. He was talkative yet struggled to modulate his loudness during the session. Augusto's voice sounded fatigued (hoarse) and he was urged several times to drink water. When asked if anything happened during the week, he responded everything was normal and that he just had family visiting soon.     Speech Therapy Objective    1. Cues from conversation partners  Augusto participated in a 55 minute conversation with the clinicians in the room. During the conversation, he needed six verbal prompts (\"Use your loud voice\" or \"I can't hear you\") and five visual prompts (pointing up or hand beside the ear). He advocated when he did not hear someone's utterance twice by saying, \"What did you say?\".  Two visual prompts were referenced throughout the session; a dB meter and a loudness thermometer showing his goal loudness (75-80 dB). The dB meter visual was enlarged compared to previous sessions to draw Augusto's attention to the measurement. Hoarseness was observed as well as abnormal throat clearing throughout the session.    2. dB level in conversation  Several dB reading were taken to determine Augusto's average conversational volume. He achieved the following:     Trial 1 79 dB Trial 11 74 dB   Trial 2 65 dB Trial 12 73 dB   Trail 3 68 dB Trial 13 65 dB   Trial 4 72 dB Trial 14 66 dB   Trial 5 66 dB Trial 15 72 dB "   Trial 6 64 dB Trial 16 64 dB   Trial 7 74 dB Trial 17 60 dB   Trial 8 72 dB Trial 18 51 dB   Trial 9 78 dB Trial 19 50 dB   Trial 10 62 dB Average: 67 dB     Assessments  Augusto presents with Parkinson's disease and Lewy body dementia. 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    Speech Therapy Plan  Augusto will return to speech therapy on 11/1/23. Visual reminders to increase his loudness will continue to be utilized to help Augusto independently monitor his dB. Verbal reminders throughout the activity will continue to be implemented.    Visit # 15/20    [x] As the licensed therapist supervising this student, I was present during the entire treatment session directing the care and reviewing the assessment plan.  I reviewed all documentation prior to signing.

## 2023-11-01 ENCOUNTER — SPEECH THERAPY (OUTPATIENT)
Dept: SPEECH THERAPY | Facility: OTHER | Age: 78
End: 2023-11-01
Payer: MEDICARE

## 2023-11-01 DIAGNOSIS — R49.0 HYPOKINETIC PARKINSONIAN DYSPHONIA (HCC): ICD-10-CM

## 2023-11-01 DIAGNOSIS — G20.A1 HYPOKINETIC PARKINSONIAN DYSPHONIA (HCC): ICD-10-CM

## 2023-11-01 PROCEDURE — 92508 TX SP LANG VOICE COMM GROUP: CPT | Mod: GN,GC,KX | Performed by: SPEECH-LANGUAGE PATHOLOGIST

## 2023-11-01 NOTE — OP THERAPY DAILY TREATMENT
"  Outpatient Speech Therapy  DAILY TREATMENT     [facility] Speech Pathology & Audiology  [address] [place] [address]  Phone:  [phone]  Fax:  [phone]    Date: 11/01/2023    Patient: Augusto Montemayor Jr.  YOB: 1945  MRN: [id number]     Time Calculation    Start time: 1000  Stop time: 1055 Time Calculation (min): 55 minutes         Chief Complaint: Speech Therapy    Visit #: 16    Subjective Evaluation  Augusto arrived on time to his session accompanied by his wife, who remained in the waiting room for the entire duration of the session. When asked how he was, Augusto replied that it was a, \"bad day\". He stated he'd been having trouble hearing people speak and keeping his balance when walking. He also complained about increasing swallow difficulty and that he almost always feels like his throat is \"closing\". This globus sensation affects his voice and makes him feel like his food is getting stuck in the area of the upper esophageal sphincter. Augusto also reported waking up with reflux in his throat. He takes multiple medications for his GERD including omeprazole (daily in the morning) and famotidine. He stated that specific foods do not seem to increase his GERD but spicy foods do cause him to cough. When asked about his liquid intake, he reported he drinks less than a cup of caffeine a day and less than one cup of water a day. Augusto was advised to avoid highly acidic foods (like tomatoes) and increase his water intake until an instrumental can be preformed.     Speech Therapy Objective    1. Cues from conversation partners  Augusto participated in a 55 minute conversation with the clinicians in the room while participating in a [employer] game. During the conversation, he needed four verbal prompts (\"Use your super loud voice\") and one visual prompt (pointing up) from the clinician to increase his loudness. He did not seem to mishear anything during the session and thus did not advocate " for other's to repeat themselves or speak up. Three visual prompts were referenced throughout the session; a dB meter on the table close to him, a loudness thermometer showing his goal of 75-80 dB, and a dB meter on a screen across the room from him. Augusto needed consistent cues to refer back to these visuals before speaking to encourage him to increase his loudness.    2. dB level in conversation  Several dB readings were taken to determine Augusto's average conversational volume. He achieved the following:      Trial 1 45 dB Trial 10 63 dB   Trial 2 67 dB Trial 11 67 dB   Trail 3 52 dB Trial 12 72 dB   Trial 4 41 dB Trial 13 58 dB   Trial 5 50 dB Trial 14 72 dB   Trial 6 42 dB Trial 15 66 dB   Trial 7 40 dB Trial 16 60 dB   Trial 8 63 dB Trial 17 74 dB   Trial 9 67 dB Average: 58 dB     Assessments  Augusto presents with Parkinson's disease and Lewy body dementia. 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    Speech Therapy Plan  Augusto will return to speech therapy on 11/8/23. More formal voice therapy target will be reintroduced (sustained loudness at a certain dB) to encourage more consistent and mindful loudness during conversation. Visual reminders will continue to be utilized and Augusto will be encouraged to reference these to self-monitor his own loudness.     Visit # 16/20  [x] As the licensed therapist supervising this student, I was present during the entire treatment session directing the care and reviewing the assessment plan.  I reviewed all documentation prior to signing.

## 2023-11-08 ENCOUNTER — SPEECH THERAPY (OUTPATIENT)
Dept: SPEECH THERAPY | Facility: OTHER | Age: 78
End: 2023-11-08
Payer: MEDICARE

## 2023-11-08 DIAGNOSIS — R49.0 HYPOKINETIC PARKINSONIAN DYSPHONIA (HCC): ICD-10-CM

## 2023-11-08 DIAGNOSIS — G20.A1 HYPOKINETIC PARKINSONIAN DYSPHONIA (HCC): ICD-10-CM

## 2023-11-08 PROCEDURE — 92508 TX SP LANG VOICE COMM GROUP: CPT | Mod: GN,GC,KX | Performed by: SPEECH-LANGUAGE PATHOLOGIST

## 2023-11-08 NOTE — OP THERAPY DAILY TREATMENT
"  Outpatient Speech Therapy  DAILY TREATMENT     [facility] Speech Pathology & Audiology  [address] [place] [address]  Phone:  [phone]  Fax:  [phone]    Date: 11/08/2023    Patient: Augusto Montemayor Jr.  YOB: 1945  MRN: [id number]     Time Calculation    Start time: 1000  Stop time: 1055 Time Calculation (min): 55 minutes         Chief Complaint: Speech Therapy    Visit #: 17    Subjective Evaluation  Augusto arrived on time to his therapy session accompanied by his wife, who remained in the waiting from for the entirety of the session. Augusto informed clinicians he was feeling overwhelmed due to all of his medical appointments. He needed frequent verbal redirection to stay on task during formal voice tasks (example: sustained \"ah\").    Speech Therapy Objective   1. Isolated voice tasks  Baseline data for loudness during voice tasks was obtained for future direct instruction in isolated voice tasks.    Task Baseline Data    Sustained vowel phonation \"ah\" 79.7 dB    20.5 seconds   Low to high pitch glide of \"ah\" 82.2 dB   High to low pitch glide of \"ah\" 77.4 dB     Augusto needed direct clinical models to sustain an \"ah\" of comfortable pitch. His average phonation of 20.5 is below normal limits. His average intensity of 79.7 is expected for his age and is within his target intensity of 75-80 dB.   Augusto needed fewer prompts to accomplish pitch glides and the average intensity for both glides (82.2 and 77.4 dB) was within his target intensity of 75-80 dB.     2. dB level in conversation  Several dB readings were taken to determine Augusto's average conversational volume during his 55 minute session. He achieved the following:      Trial 1 58 dB Trial 7 42 dB   Trial 2 54 dB Trial 8 57 dB   Trail 3 73 dB Trial 9 62 dB   Trial 4 80 dB Trial 10 60 dB   Trial 5 61 dB Trial 11 62 dB   Trial 6 65 dB Average: 61 dB     Throughout the session, Augusto needed five verbal prompts (\"I can't hear " "you\" or \"Let's try that in a louder voice\") and seven visual prompts (clinician pointing up for him to increase his intensity). Three visual prompts were referenced throughout the session; a dB meter on a phone near him, a dB meter on a screen across the room from him, and a loudness thermometer showing his goal loudness of 75-80 dB. Augusto's attention was brought to these visuals in isolation or in combination during conversation.    Assessments  Augusto presents with Parkinson's disease and Lewy body dementia. 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    Speech Therapy Plan  Augusto will return to speech therapy on 11/15/23. During his next session, Augusto will generate a list of functional sentences that can be said while practicing his goal intensity (75-80 dB). Isolated voice tasks focusing on increasing Sissy's loudness will continue. Unstructured conversation will also continue for the last half of the session to generalize skills learned during isolated voice tasks.      Visit # 17/20  [x] As the licensed therapist supervising this student, I was present during the entire treatment session directing the care and reviewing the assessment plan.  I reviewed all documentation prior to signing.            "

## 2023-11-15 ENCOUNTER — APPOINTMENT (OUTPATIENT)
Dept: SPEECH THERAPY | Facility: OTHER | Age: 78
End: 2023-11-15
Payer: MEDICARE

## 2023-11-20 ENCOUNTER — OFFICE VISIT (OUTPATIENT)
Dept: NEUROLOGY | Facility: MEDICAL CENTER | Age: 78
End: 2023-11-20
Attending: PSYCHIATRY & NEUROLOGY
Payer: MEDICARE

## 2023-11-20 ENCOUNTER — HOSPITAL ENCOUNTER (OUTPATIENT)
Dept: LAB | Facility: MEDICAL CENTER | Age: 78
End: 2023-11-20
Attending: PSYCHIATRY & NEUROLOGY
Payer: MEDICARE

## 2023-11-20 VITALS
BODY MASS INDEX: 23.51 KG/M2 | HEIGHT: 65 IN | HEART RATE: 74 BPM | OXYGEN SATURATION: 98 % | RESPIRATION RATE: 14 BRPM | SYSTOLIC BLOOD PRESSURE: 106 MMHG | WEIGHT: 141.09 LBS | DIASTOLIC BLOOD PRESSURE: 60 MMHG

## 2023-11-20 DIAGNOSIS — F03.A0 MILD NEURODEGENERATIVE DEMENTIA (HCC): ICD-10-CM

## 2023-11-20 DIAGNOSIS — G20.C PARKINSONISM, UNSPECIFIED PARKINSONISM TYPE (HCC): ICD-10-CM

## 2023-11-20 DIAGNOSIS — Z98.890 HISTORY OF LEFT-SIDED CAROTID ENDARTERECTOMY: ICD-10-CM

## 2023-11-20 DIAGNOSIS — F03.A0 MILD NEURODEGENERATIVE DEMENTIA (HCC): Primary | ICD-10-CM

## 2023-11-20 LAB
25(OH)D3 SERPL-MCNC: 35 NG/ML (ref 30–100)
ANION GAP SERPL CALC-SCNC: 11 MMOL/L (ref 7–16)
BUN SERPL-MCNC: 23 MG/DL (ref 8–22)
CALCIUM SERPL-MCNC: 8.9 MG/DL (ref 8.5–10.5)
CHLORIDE SERPL-SCNC: 100 MMOL/L (ref 96–112)
CO2 SERPL-SCNC: 25 MMOL/L (ref 20–33)
CREAT SERPL-MCNC: 0.7 MG/DL (ref 0.5–1.4)
FOLATE SERPL-MCNC: 10.7 NG/ML
GFR SERPLBLD CREATININE-BSD FMLA CKD-EPI: 94 ML/MIN/1.73 M 2
GLUCOSE SERPL-MCNC: 76 MG/DL (ref 65–99)
POTASSIUM SERPL-SCNC: 4.4 MMOL/L (ref 3.6–5.5)
SODIUM SERPL-SCNC: 136 MMOL/L (ref 135–145)
TSH SERPL DL<=0.005 MIU/L-ACNC: 3.16 UIU/ML (ref 0.38–5.33)
VIT B12 SERPL-MCNC: 921 PG/ML (ref 211–911)

## 2023-11-20 PROCEDURE — 82746 ASSAY OF FOLIC ACID SERUM: CPT

## 2023-11-20 PROCEDURE — 84425 ASSAY OF VITAMIN B-1: CPT

## 2023-11-20 PROCEDURE — 99215 OFFICE O/P EST HI 40 MIN: CPT | Performed by: PSYCHIATRY & NEUROLOGY

## 2023-11-20 PROCEDURE — 80048 BASIC METABOLIC PNL TOTAL CA: CPT

## 2023-11-20 PROCEDURE — 82306 VITAMIN D 25 HYDROXY: CPT | Mod: GA

## 2023-11-20 PROCEDURE — 3074F SYST BP LT 130 MM HG: CPT | Performed by: PSYCHIATRY & NEUROLOGY

## 2023-11-20 PROCEDURE — 3078F DIAST BP <80 MM HG: CPT | Performed by: PSYCHIATRY & NEUROLOGY

## 2023-11-20 PROCEDURE — 82607 VITAMIN B-12: CPT

## 2023-11-20 PROCEDURE — 99212 OFFICE O/P EST SF 10 MIN: CPT | Performed by: PSYCHIATRY & NEUROLOGY

## 2023-11-20 PROCEDURE — 36415 COLL VENOUS BLD VENIPUNCTURE: CPT | Mod: GA

## 2023-11-20 PROCEDURE — 84443 ASSAY THYROID STIM HORMONE: CPT | Mod: GA

## 2023-11-20 ASSESSMENT — MONTREAL COGNITIVE ASSESSMENT (MOCA)
8. SERIAL SUBTRACTION OF 7S: 2 OR 3/5
2. COPY DRAWING: 0/1
7. [VIGILENCE] TAP WHEN HEARING DESIGNATED LETTER: 1/1
1. ALTERNATING TRAIL MAKING: 0/1
4. NAME EACH OF THE THREE ANIMALS SHOWN: 3/3
6. READ LIST OF DIGITS [FORWARD/BACKWARD]: 2/2
DELAYED RECALL SUBSCORE: 3/5
5. MEMORY TRIALS: SECOND TRIAL
WHAT IS THE TOTAL SCORE (OUT OF 30): 22
CATEGORY CUE (IF APPLICABLE): 2
3. DRAW A CLOCK: CONTOUR, NUMBERS, HANDS: 2/3
ORIENTATION SUBSCORE: 5/6
9. REPEAT EACH SENTENCE: 1/2
10. [FLUENCY] NAME WORDS STARTING WITH DESIGNATED LETTER: 1/1
11. FOR EACH PAIR OF WORDS, WHAT CATEGORY DO THEY BELONG TO (OUT OF 2): 2/2

## 2023-11-20 ASSESSMENT — PATIENT HEALTH QUESTIONNAIRE - PHQ9: CLINICAL INTERPRETATION OF PHQ2 SCORE: 0

## 2023-11-20 NOTE — PROGRESS NOTES
"  Followed by Dr. Tran Arevalo for 2 year or so for Dementia issue.    Last seen by Tran in 2022 at [facility].     He is here with Samantha and worked as a capable  - retired in .    Problem List reviewed:     About 3 years or so go when he started to have problems with his remote control (like checking the DVR)- he would hit the wrong buttons and then frustrated. There was also some \"short term memory loss\"> wife would talk about going somewhere and he would forget the conversation with 5 minutes and with slight forgetfulness over the last 2-3 years or so.     For more than 3-4 days of the week he will repeat himself within 5 to 10 minutes for well over 2 years or so.     The wife  has noticed he has problems with dates and times for over 2 years.     2 or 3 times a week he may see a formed person or animal or \"is something moving throughout the yard\"    The wife does  not endorse any notable cognitive/behavioral fluctuations in the last 12 months.     He has for the last 5-6 years or so wife has noticed he body his kick or jump which can last 1-2 hours and he can infrequently snore and he uses a dental device and he tried a CPAP machine (which drove him insane due to the mask).     Voice has clearly gotten softer in the last 6-12 months.     He has  had no falls or near falls in the last 6 months.    He has not had any dysphagia in the last 3 to 6 months.     He denies lack or loss of appetite in the recent months.     He  used to have a left hand rest tremor and then moved to the right hand and handwriting has gotten smaller.     ERIC Scan done about 2 to 3 years and was abnormal.     Family Hx: Parkinson's Disease- in mid 50's and  at age 78.      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    Lewy body dementia (HCC) 2022    Epididymitis 10/14/2021    Major neurocognitive disorder " "probably due to vascular disease, without behavioral disturbance ([facility]) 05/28/2021    Moderate recurrent major depression ([facility]) 10/02/2020    Generalized anxiety disorder 10/02/2020    Obstructive sleep apnea 05/05/2016    Lumbar stenosis 12/02/2013    Generalized pain 10/30/2013    Occlusion and stenosis of carotid artery without mention of cerebral infarction 07/24/2013    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    Pacemaker [employer] placed 2/23/12 02/24/2012    Syncope and collapse 02/21/2012    Palpitations 02/21/2012       Past medical history:   Past Medical History:   Diagnosis Date    Anesthesia     became combative with LAST surgery; unable to urinate and ended up  in the hospital    Arrhythmia     Ventricular Tachycardia    Arthritis     all joints    Bronchitis     Cataract 2018    Removed bilat    GERD (gastroesophageal reflux disease)     Heart burn     Heart murmur     Heart valve disease     \"leaky\"    Hiatus hernia syndrome     no repair    High cholesterol     Indigestion     episodic    Major neurocognitive disorder probably due to vascular disease, without behavioral disturbance ([facility]) 05/28/2021    KAMI (obstructive sleep apnea)     Uses a dental device    Osteoporosis     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    Pain     lumbar spine down L leg/buttock; 5/10    Palpitations 02/21/2012    Psychiatric problem     Depression    Seizure ([facility])     last seizure 05/1980    Stroke ([facility]) [place] [id number]    possible TIA, blind in one eye less than a minute duration    Syncope and collapse 02/21/2012    hypersensitive R carotid artery; why pt has a pacermaker    Trouble swallowing 07/22/2021    Unspecified hemorrhagic conditions     brusies easily, fragile skin, bleeds easily       Past surgical history:   Past Surgical History:   Procedure Laterality Date    CATARACT EXTRACTION WITH IOL Bilateral 2018    LUMBAR DECOMPRESSION  12/02/2013    Performed by Aleksey Garcia M.D. at [facility]" [place] [facility]    FORAMINOTOMY  2013    Performed by Aleksey Garcia M.D. at [facility] ORS    TN NJX AA&/STRD TFRML EPI LUMBAR/SACRAL 1 LEVEL  10/30/2013    Performed by Demond Sullivan M.D. at SURGERY [facility] ORS    TN NJX AA&/STRD TFRML EPI LUMBAR/SACRAL EA ADDL  10/30/2013    Performed by Demond Sullivan M.D. at SURGERY [facility] ORS    CAROTID ENDARTERECTOMY  2013    Performed by Collins Conroy M.D. at SUR[facility]ERY [facility] ORS    RECOVERY  2012    Performed by SURGERY, CATH-RECOVERY at [facility]    OTHER      pacemaker    TONSILLECTOMY           Social history:   Social History     Socioeconomic History    Marital status:      Spouse name: Not on file    Number of children: Not on file    Years of education: Not on file    Highest education level: Not on file   Occupational History    Not on file   Tobacco Use    Smoking status: Former     Current packs/day: 0.00     Average packs/day: 3.0 packs/day for 6.0 years (18.0 ttl pk-yrs)     Types: Cigarettes     Start date: 1961     Quit date: 1967     Years since quittin.9    Smokeless tobacco: Never   Vaping Use    Vaping Use: Never used   Substance and Sexual Activity    Alcohol use: Yes     Alcohol/week: 0.0 oz     Comment: RARE 1 every 6 months    Drug use: Never    Sexual activity: Not on file   Other Topics Concern    Not on file   Social History Narrative    Not on file     Social Determinants of Health     Financial Resource Strain: Not on file   Food Insecurity: Not on file   Transportation Needs: Not on file   Physical Activity: Not on file   Stress: Not on file   Social Connections: Not on file   Intimate Partner Violence: Not on file   Housing Stability: Not on file       Family history:   Family History   Problem Relation Age of Onset    Other Mother         wegeners granulomatosis    Psychiatric Illness Mother     Other Father         parkinsons,dementia    Psychiatric Illness Sister   "        Current medications:   Current Outpatient Medications   Medication    Ascorbic Acid (VITAMIN C PO)    MAGNESIUM PO    CALCIUM PO    FLUoxetine (PROZAC) 10 MG Cap    omeprazole (PRILOSEC) 20 MG delayed-release capsule    zolpidem (AMBIEN) 5 MG Tab    famotidine (PEPCID) 40 MG Tab    Multiple Vitamin (MULTIVITAMIN PO)    rosuvastatin (CRESTOR) 5 MG Tab    docusate sodium (COLACE) 100 MG CAPS    Probiotic Product (PRO-BIOTIC BLEND PO)    Lutein 20 MG CAPS    erythromycin 5 MG/GM Ointment    donepezil (ARICEPT) 10 MG tablet    omeprazole (PRILOSEC) 40 MG delayed-release capsule     No current facility-administered medications for this visit.       Medication Allergy:  Allergies   Allergen Reactions    Serzone [Nefazodone Hydrochloride] Anaphylaxis    Ibuprofen Hives     Chest pains if taken long term    Nsaids Hives     Chest pain, if taken long term    Lexapro      Pt reports double vision and confusion    Other Misc Rash     Adhesive tape,  3-0 vicryl, 4-0 monocryl. Paper tape is ok for short-periods.           Physical examination:   Vitals:    11/20/23 1010   BP: 106/60   BP Location: Left arm   Patient Position: Sitting   BP Cuff Size: Adult   Pulse: 74   Resp: 14   SpO2: 98%   Weight: 64 kg (141 lb 1.5 oz)   Height: 1.651 m (5' 5\")       Normal cephalic atraumatic.  There is full range of movement around the neck in all directions without restrictions or discrete pain evoked triggers.  No lower extremity edema.      Neurological  Exam:      Matthew Cognitive Assessment (MOCA) Version 7.1    Years of Education: 3 years college    TOTAL SCORE: 22/30  (to be scanned into the MEDIA section in the E.M.R.)    Mental status: Awake, alert and fully oriented to person, place, time, and situation. Normal attention and concentration.  Did not appear/act combative,irritable,anxious,paranoid/delusional or aggressive to or with me.    Speech and language: Speech is fluent without errors, clear, intact to repetition, " and intact to naming.     Follows 3 step motor commands in sequence without significant delay and correctly.    Cranial nerve exam:  II: Pupils are equally round and reactive to light. Visual fields are intact by confrontation.  III, IV, VI: EOMI, no diplopia, no ptosis.  V: Sensation to light touch is normal over V1-3 distributions bilaterally.  .  VII: Facial movements are symmetrical. There is no facial droop. .  VIII: Hearing intact to soft speech and finger rub bilaterally  IX: Palate elevates symmetrically, uvula is midline. Dysarthria is not present.  XI: Shoulder shrug are symmetrical and strong.   XII: Tongue protrudes midline.      Motor exam:    Muscle tone is normal in all 4 limbs.     The Left vs Right hand is mildly slow when opening and closing hand vs left hand.     Left vs right foot (reduced speed in tapping).     Left leg vs right is slower.    Muscle strength:    Neck Flexors/Extensors: 5/5       Right  Left  Deltoid   5/5  5/5      Biceps   5/5  5/5  Triceps              5/5  5/5   Wrist extensors 5/5  5/5  Wrist flexors  5/5  5/5     5/5  5/5  Interossei  5/5  5/5  Thenar (APB)  5/5  5/5   Hip flexors  5/5  5/5  Quadriceps  5/5  5/5    Hamstrings  5/5  5/5  Dorsiflexors  5/5  5/5  Plantarflexors  5/5  5/5  Toe extension  5/5  5/5        Reflexes:       Right  Left  Biceps   2/4  2/4  Triceps              2/4  2/4  Brachioradialis 2/4  2/4  Knee jerk  2/4  2/4  Ankle jerk  2/4  2/4     Frontal release signs are absent    bilaterally toes are downgoing to plantar stimulation..    Coordination (finger-to-nose, heel/knee/shin, rapid alternating movements) was normal.     There was no ataxia, no tremors, and no dysmetria.     Station and gait >    Easily stands up from exam chair without retropulsion,veering,leaning,swaying (to either side).     Negative Pull Test.    Negative Rombergism.    Labs and Tests:    0 Result Notes            Component  Ref Range & Units 4 mo ago  (7/6/23) 10 yr  ago  (13) 10 yr ago  (13) 11 yr ago  (12) 17 yr ago  (05)   WBC  4.8 - 10.8 K/uL 9.5 6.9 6.2 5.8 8.5   RBC  4.70 - 6.10 M/uL 4.66 Low  5.04 5.01 4.88 5.01   Hemoglobin  14.0 - 18.0 g/dL 14.3 15.7 15.1 15.2 15.0   Hematocrit  42.0 - 52.0 % 42.9 46.4 46.1 45.1 43.9   MCV  81.4 - 97.8 fL 92.1 92.2 R 92.0 R 92.6 R 87.7 R   MCH  27.0 - 33.0 pg 30.7 31.1 30.1 31.1 30.0   MCHC  32.3 - 36.5 g/dL 33.3 33.8 R 32.7 R 33.6 R 34.2 R   Comment: Please note new reference range effective 2023.   RDW  35.9 - 50.0 fL 48.0 14.4 R 14.0 R 14.3 R 13.8 R   Platelet Count  164 - 446 K/uL 242 251 258  296   MPV  9.0 - 12.9 fL 9.1 7.4 R 7.2 R             NEUROIMAGIN/6/2023 1:25 PM     HISTORY/REASON FOR EXAM:  evaluate for Lewy Body Disease  Mild narrowing degenerative dementia.     TECHNIQUE/EXAM DESCRIPTION AND NUMBER OF VIEWS:  PET CT Metabolic evaluation of the brain.     Initially, 10.76 mCi F-18 FDG was administered intravenously under standardized conditions. Approximately 30 minutes after FDG administration, the patient was placed in the supine position on the PET CT table for brain imaging. CT images, PET images and   fused CT/PET images were reviewed on a computer workstation. Blood glucose level was 84 mg/dL. The low dose unenhanced CT images were used for attenuation correction and anatomic correlation only.     COMPARISON: CT head 2021     FINDINGS:  Mild decreased activity in the parietal regions bilaterally.  Brain volume: No significant atrophy  Additional findings: None. .     IMPRESSION:     Mild decreased activity in the parietal regions bilaterally, with relative sparing of the occipital lobes, favoring Alzheimer's type dementia over dementia with Lewy bodies.           Exam Ended: 23  2:23 PM Last Resulted: 23  2:47 PM                 Impression/Plans/Recommendations:    Parkinsonism- onset 3 years ago with associated cognitive/memory disturbance.    Abnormal ERIC Scan  in 1/2022.     Differential includes Parkinson's Dz vs Lewy Body.     The clinical issues that support Lewy Body Disease including very mild visual hallucination.     There has also been REM Sleep Behavioral symptoms.     However he has no history of cognitive fluctuations per wife in the last 3-6 months.     2. Early stage of a  Dementia- related to #1> based on information provided by wife and verified on the Global Deterioration Score today (18 today per wife)> wife gave him 3 (2's) and multiple 2(s) today.    MOCA score today of 23/30 today.    Global Deterioration Score today in the 3 to 4  range per today wife.    We discussed the consideration of Dozepezil (10 mg a day) vs other cognitive medications like Namenda and he is in agreement to not use them at this time or point.    3. Carotid Endarctectomy- Left (about 6 years ago)     Plans:       A. Will set for formal Neuropsychological evaluation with Dr. Zavala given the dis concordance on advanced Neuro imaging.      B . Will hold off on Dopamine medications at this time- we reviewed Sinemet today and it's pros and cons.    C. Will be rechecking Vitamin B levels and D levels.    D.  Recommending taking daily ASA (81 mg) and a high intensity statin (20 to 40 mg of Rosuvastatin). Risks of these medications reviewed today (bleeding in any part of his body or within brain, myalgia,myopathy or rhabdo).    E.  Cardiac Pacemaker in place and cardiac follow up visit to be done in the recent months.        I have performed  a history and physical exam and a directed /focused  ROS today.    Total time spent today or this patient's care was 50 minutes or so and included reviewing  the diagnostic workup to date (such as labs and imaging as well as interpreting such tests relevant to this patient's neurological condition),  reviewing/obtaining separately obtained history from Augusto and wife  for today's neurological problem(s) ,counseling and educating the patient and  family member on issues related to cognition/memory and cognitive health factors and documenting  the clinical information in the EMR.    Follow up at this time in 9 months or so        Calvin Mckeon MD  [facility] [facility].   [facility]

## 2023-11-22 ENCOUNTER — SPEECH THERAPY (OUTPATIENT)
Dept: SPEECH THERAPY | Facility: OTHER | Age: 78
End: 2023-11-22
Payer: MEDICARE

## 2023-11-22 DIAGNOSIS — R49.0 HYPOKINETIC PARKINSONIAN DYSPHONIA (HCC): ICD-10-CM

## 2023-11-22 DIAGNOSIS — G20.A1 HYPOKINETIC PARKINSONIAN DYSPHONIA (HCC): ICD-10-CM

## 2023-11-22 PROCEDURE — 92508 TX SP LANG VOICE COMM GROUP: CPT | Mod: GN,GC,KX | Performed by: SPEECH-LANGUAGE PATHOLOGIST

## 2023-11-23 NOTE — OP THERAPY PROGRESS SUMMARY
"  Outpatient Speech Therapy  PROGRESS SUMMARY NOTE      [facility] Speech Pathology & Audiology  [address] [address]  Phone:  [phone]  Fax:  [phone]    Date of Visit: 11/22/2023    Patient: Augusto Montemayor Jr.  YOB: 1945  MRN: [id number]     Referring Provider: MORGAN Garvey  [address] [address]   Referring Diagnosis No admission diagnoses are documented for this encounter.      Visit #: 18    Progress Report Period: 09/06/2023-11/22/23    Time Calculation    Start time: 1000  Stop time: 1100 Time Calculation (min): 60 minutes         Chief Complaint: Speech Therapy    Visit Diagnoses     ICD-10-CM   1. Hypokinetic Parkinsonian dysphonia  G20.A1    R49.0       Subjective Evaluation  The note below serves as both a record of service provided on 11/22/23 (see \"current data\" as gathered on this date of service), and also a record of progress across the last 8-week period. Augusto has excellent attendance (9/10 scheduled sessions attended, one session missed due to sickness) to speech therapy. Augusto arrived on time today to his therapy session accompanied by his wife, who remained in the waiting room for the entire duration of the session. He reported he was having a much better week compared to last week, when he was sick.     Speech Therapy Objective    Long-Term Goal: Mr. Montemayor will demonstrate carryover from therapy to his home environment as reported by his wife.     Short-Term Goal 1: Mr. Montemayor will increase his average loudness in conversation to 75-80 dB in two consecutive sessions.   Baseline Data 9/13/23: Mr. Wests average loudness during unstructured conversation was 49 dB  Current Data 11/22/23: Mr. Wests average loudness during unstructured conversation was 60 dB  Status: Augusto has demonstrated slight goal advancement within the area of increasing conversational loudness. He needs visual and verbal prompts in order to " "maintain loudness and does not typically modulate his own loudness independently. Augusto has fluctuated in loudness throughout the semester and his loudness often correlates to how he feels that day. Visual prompts were utilized throughout the semester (two separate dB meters as well as a loudness thermometer showing his goal loudness) to support increased loudness during conversation. Augusto was instructed to \"think loud\" and speak with intent after he expressed that he feels using a loud voice is not always appropriate. Continued water intake was encouraged during each session to help prevent vocal fatigue.   Goal not met.     Short-Term Goal 2: Mr. Montemayor will maintain his average loudness at 80 dB during sustained phonation and simultaneous cognitive tasks.   Baseline Data: Goal not targeted during treatment period.   Current Data: Goal not targeted during treatment period.  Status: Goal not met.    Short-Term Goal 3: Mr. Montemayor will demonstrate carryover of taught strategies in a group session with 90% accuracy and minimal verbal cueing.    Baseline Data 9/6/23: He required moderate verbal cues from the clinician to utilize taught strategies from the summer treatment period.  Current Data 11/23/23: He required moderate verbal cues from the clinician to utilize taught strategies from the summer treatment period.   Status: Augusto is not demonstrating effective carryover of taught strategies from the previous treatment period. He needs prompting continually to keep a loudness in conversation that is over 50 dB, while his goal is 75-80 dB. To support continued goal advancement, clinicians implemented isolated voice tasks on 11/08/23 that Augusto utilized previously to increase carry over to his group sessions. These isolated voice tasks included sustained vowel phonation \"ah\", low to high pitch glide of \"ah\", and high to low pitch glide of \"ah\". Below is baseline and current data for these targets:    Task Baseline Data " "(11/08/23) Current Data (11/22/23)   Sustained vowel phonation \"ah\" 79.7 dB 82.5 dB    20.5 seconds 16.5 seconds   Low to high pitch glide of \"ah\" 82.2 dB 81.3 dB   High to low pitch glide of \"ah\" 77.4 dB 81.1 dB     It should be noted that between 11/08/23 and 11/22/23, Augusto only attended 2/3 sessions due to sickness. Isolated voice tasks like the ones above show increased loudness compared to conversation.   To support carryover of isolated voice tasks to conversational loudness, Augusto also was asked to create functional sentence that will be practiced during future sessions. The sentences are functional for him and are ones he reports he says almost daily. The sentences are listed below:    I love you  Get out of there!  It looks terrible in here.  You're a nice pamela.  Honey, can you please help me button these?  Can you turn the volume down please?  What is that for?  I would like the House Special Chow Mein.  I'm okay with just water.  Honey, we gotta cut this off.     To increase conversational loudness, the sentences above should be practiced with a loud voice while speaking with intent. HEP was provided for Augusto to practice sustained vowel phonation \"ah\", low to high pitch glide of \"ah\", and high to low pitch glide of \"ah\" while at home. Directions for how to access an online dB meter were also provided for him to track his loudness at home without the aid of a clinician while he is not in therapy.  Goal not met.    Assessments  Mr. Montemayor presents with Parkinson's disease and Lewy body dementia. 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     Plan:  Mr. Montemayor will return to skilled intervention for the Spring 2024 semester to continue to address his motor speech, voice, and potentially any swallow deficits that may or not be identified on his Modified Barium Swallow Study that is currently scheduled for December 19th, " "2023.     Speech Therapy Plan :   Prognosis & Recommendations  Impression Summary:  Long term and short term goals have been updated moving into the Spring 2024 semester. Please see below for modifications.   Prognosis:  Good  Goals  Short Term Goals:  STG 1: Mr. Montemayor will increase his average loudness in conversation to 75-80 dB in two consecutive sessions.     STG 2: Mr. Montemayor will maintain his average loudness at 80 dB during sustained phonation during simultaneous cognitive tasks.     STG 3: Mr. Montemayor will demonstrate carryover of taught strategies in a group session with 90% accuracy and minimal verbal cueing.        Short Term Goal Duration (Weeks):  2-4 months  Long Term Goals:  Mr. Montemayor will demonstrate carryover of an \"intentional voice\" from therapy to his home environment as reported by his wife across 3 consecutive sessions.  Long Term Goal Duration (Weeks):  4-6 months  Goal Comments:  Mr. Montemayor is awaiting an instrumental swallow evaluation (MBSS) due to previous reports of swallowing problems. After this swallowing evaluation is complete, goals regarding swallowing rehabilitation might be warranted to promote increased airway protection. Mr. Montemayor would benefit from a \"refresher\" round of either LSVT or SPEAK OUT to improve his baseline motor speech.   Frequency:  2x week  Duration (in visits):  20    Referring provider co-signature:  I have reviewed this plan of care and my co-signature certifies the need for services.    Certification Period: 11/22/2023 to 4/18/2024    Physician Signature: ________________________________ Date: ______________       [x] As the licensed therapist supervising this student, I was present during the entire treatment session directing the care and reviewing the assessment plan.  I reviewed all documentation prior to signing.      "

## 2023-11-24 LAB — VIT B1 BLD-MCNC: 156 NMOL/L (ref 70–180)

## 2023-11-29 ENCOUNTER — APPOINTMENT (OUTPATIENT)
Dept: SPEECH THERAPY | Facility: OTHER | Age: 78
End: 2023-11-29
Payer: MEDICARE

## 2023-12-19 ENCOUNTER — HOSPITAL ENCOUNTER (OUTPATIENT)
Dept: RADIOLOGY | Facility: MEDICAL CENTER | Age: 78
End: 2023-12-19
Attending: NURSE PRACTITIONER
Payer: MEDICARE

## 2023-12-19 DIAGNOSIS — T17.308D CHOKING, SUBSEQUENT ENCOUNTER: ICD-10-CM

## 2023-12-19 DIAGNOSIS — R13.12 OROPHARYNGEAL DYSPHAGIA: ICD-10-CM

## 2023-12-19 PROCEDURE — 74230 X-RAY XM SWLNG FUNCJ C+: CPT

## 2023-12-19 PROCEDURE — 92611 MOTION FLUOROSCOPY/SWALLOW: CPT | Performed by: SPEECH-LANGUAGE PATHOLOGIST

## 2023-12-20 NOTE — OP THERAPY EVALUATION
[facility] Therapy Services  Outpatient Modified Barium Swallow Study           Patient Name: Alex Montemayor Jr.  Date of Evaluation: 12/19/2023  Referring Provider: STEFAN Singh  Fax: [phone]        Patient History/Reason for Referral  This is a 77 y/o male who was referred for a Modified Barium Swallow Study (MBSS) by this same SLP as this patient is receiving skilled speech therapy intervention from this SLP at the [facility] for his Parkinson Disease and frontotemporal dementia. Pt was in a session one day and complained of new onset difficulty drinking his water. Pt previously drank the majority of his water bottle while in therapy so this was a new change for him to be fearful of choking on his water.     Pt scheduled for an EGD in January 2024.     Pt arrived on time for his evaluation and his wife accompanied him. They both participated in education after the session.     Current Method of Nutrition   Oral diet (Regular solids, thin liquids)      Pertinent Information  Affect/Behavior: Appropriate, Cooperative  Oxygen Requirements: Room Air  Secretion Management: WNL      Subjective  Pt was alert and cooperative for evaluation. His wife accompanied him today. Pt is known to this SLP so he wanted to chat and catch up as well.       Discussed with the risks, benefits, and alternatives of the MBSS procedure. Patient/family acknowledged and agreed to proceed.    Assessment  Videofluoroscopic Swallow Study was conducted in the lateral and AP projection(s) to evaluate oropharyngeal swallow function. A radiology tech was present to assist with the procedure.       Positioning: seated upright in fluoroscopy chair, standing (AP view)  Anatomic View:  Pt with a large CP bar at the level of his UES obstructing flow and contributing to retrograde flow back into the pharynx.     Bolus Administration: Patient  PO barium contrast trials: Varibar thin liquid, Varibar nectar (mildly  thick) liquid, Varibar pudding, solid coated in Varibar pudding      Consistency PAS Score Timing Comments   Thin Liquid 1 N/A 1st cup sip: PAS 1  2nd cup sip: PAS 1  3rd cup sip: PAS 1  4th cup sip: PAS 1   Mildly Thick Liquid 1 N/A    Pudding 1 N/A    Solid 1 N/A        Penetration-Aspiration Scale (PAS)  1     No contrast enters airway  2     Contrast enters the airway, remains above the vocal folds, and is ejected from the airway (not seen in the airway at the end of the swallow).  3     Contrast enters the airway, remains above the vocal folds, and is not ejected from the airway (is seen in the airway after the swallow).  4     Contrast enters the airway, contacts the vocal folds, and is ejected from the airway.  5     Contrast enters the airway, contacts the vocal folds, and is not ejected from the airway  6     Contrast enters the airway, crosses the plane of the vocal folds, and is ejected from the airway.  7     Contrast enters the airway, crosses the plane of the vocal folds, and is not ejected from the airway despite effort.  8     Contrast enters the airway, crosses the plane of the vocal folds, is not ejected from the airway and there is no response to aspiration.        Oral phase:  Lip closure was characterized by complete labial seal.   Tongue control during bolus hold was posterior escape of less than half of bolus   Bolus preparation/mastication was timely and efficient.   Bolus transport/lingual motion was delayed initiation of tongue motion.   Oral clearance was incomplete with residue collection on oral structures.  Initiation of pharyngeal swallow was severely delayed with bolus head in pyriform sinuses at first hyoid excursion.      Pharyngeal phase:  Soft palate elevation was complete with no bolus between soft palate (SP)/pharyngeal wall (PW).  Laryngeal elevation was complete superior movement of thyroid cartilage with complete approximation of arytenoids to epiglottic petiole.   Anterior  hyoid excursion was complete for anterior movement.  Epiglottic inversion was partial  Laryngeal vestibule closure was complete with no air/contrast in the laryngeal vestibule.  Pharyngeal stripping wave was present but diminished.    Pharyngeal contraction was complete.  Pharyngoesophageal Segment Opening was partial for distension/partial duration with partial obstruction of flow.  Tongue base retraction was moderately decreased with narrow column of contrast or air between TB and PW.  Pharyngeal residue was mild with collection of residue within or on his base of tongue, in the valleculae, and in the pyriform sinuses. Residue cleared with double swallow.       Esophageal phase:  Esophageal clearance esophageal retention with retrograde flow through the pharyngo- esophageal segment (PES).      MELISA Level:  Level 4: Mild-moderate dysphagia- intermittent supervision/cuing, one or two consistencies restricted    Dysphagia Outcome and Severity Scale:   MELISA Level:  Characteristics:   Level 7: Normal in all situations No strategies or extra time needed   Level 6: Within functional limits/modified independence -Patient may have mild oral or pharyngeal delay, retention or trace epiglottal undercoating but independently and spontaneously  compensates/clears  -May need extra time for meal  -Have no aspiration or penetration across consistencies  -Full P.O: Modified diet and/or independence   Level 5: Mild dysphagia: Distant supervision, may need one diet consistency restricted May exhibit one or more of the following:  -Aspiration of thin liquids only but with strong reflexive cough to clear completely  -Airway penetration midway to cords with one or more consistency or to cords with one consistency but clears spontaneously  -Retention in pharynx that is cleared spontaneously  -Mild oral dysphagia with reduced mastication and/or oral retention that is cleared spontaneously   Level 4: Mild-moderate dysphagia: Intermittent  supervision/cueing, one or two consistencies restricted May exhibit one or more of the following:  -Retention in pharynx cleared with cue  -Retention in the oral cavity that is cleared with cue  -Aspiration with one consistency, with weak or no reflexive cough  -Or airway penetration to the level of the vocal cords with cough with two consistencies  -Or airway penetration to the level of the vocal cords without cought with one consistency   Level 3: Moderate dysphagia: Total assist, supervision, or strategies, two or more diet consistencies restricted May exhibit one or more of the following:  -Moderate retention in pharynx, cleared with cue  -Moderate retention in oral cavity, cleared with cue  -Airway penetration to the level of the vocal cords without cough with two or more consistencies  -Or aspiration with two consistencies, with weak or no reflexive cough  -Or aspiration with one consistency, no cough and airway penetration to cords with one, no cough  -Nonoral nutrition necessary   Level 2: Moderately severe dysphagia: Maximum assistance or use of strategies with partial P.O. only (tolerates at least one consistency safely  with total use of strategies) May exhibit one or more of the following:  -Severe retention in pharynx, unable to clear or needs multiple cues  -Severe oral stage bolus loss or retention, unable to clear or needs multiple cues  -Aspiration with two or more consistencies, no reflexive cough, weak volitional cough  -Or aspiration with one or more consistency, no cough and airway penetration to cords with one or more consistency, no cough   Level 1: Severe dysphagia: NPO: Unable to tolerate any P.O. safely May exhibit one or more of the following:  -Severe retention in pharynx, unable to clear  -Severe oral stage bolus loss or retention, unable to clear  -Silent aspiration with two or more consistencies, nonfunctional volitional cough  -Or unable to achieve swallow         Compensatory  Strategies:  Double swallow effective in clearing pharyngeal residue.       Clinical Impressions  The pt presents with a mild-moderate oropharyngeal and moderate pharyngoesophageal dysphagia. His oral phase was characterized by decreased bolus hold with bolus loss into the pharynx, slowed lingual transport, and mild oral residue. His pharyngeal phase was characterized by delayed initiation of swallow, decreased tongue base retraction, and diminished pharyngeal stripping wave all contributing to mild pharyngeal residue that was cleared with a double swallow. Pt's PES/UES opening was partially obstructed by the presence of a large CP bar that also contributed to retrograde flow back into the pharynx.       Recommendations  Regular solids, thin liquids  2.  Swallowing Instructions & Precautions:   Supervision: Distant supervision - check on patient 2-3 times per meal  Positioning: Fully upright and midline during oral intake, Remain upright for 90 minutes after oral intake  Medication: As tolerated  Strategies: Small bites/sips, Alternate bites and sips, Slow rate of intake, Multiple swallows (x 2) per bite/sip   Oral Care: BID  3.  Skilled ST intervention continues to be warranted. The above information will be taken into account and implemented into his current POC.   4.  Pt to f/u in January with his GI for his EGD. Recommend management of the CP bar at that time.         Thank you for the referral. For further questions, please call [phone].  Erin Golden MS, CCC-SLP

## 2024-01-29 ENCOUNTER — SPEECH THERAPY (OUTPATIENT)
Dept: SPEECH THERAPY | Facility: OTHER | Age: 79
End: 2024-01-29
Payer: MEDICARE

## 2024-01-29 DIAGNOSIS — G31.83 PARKINSON'S DISEASE, LEWY BODY (HCC): ICD-10-CM

## 2024-01-29 DIAGNOSIS — F02.80 PARKINSON'S DISEASE, LEWY BODY (HCC): ICD-10-CM

## 2024-01-29 PROCEDURE — 92507 TX SP LANG VOICE COMM INDIV: CPT | Mod: GN,GC | Performed by: SPEECH-LANGUAGE PATHOLOGIST

## 2024-01-31 NOTE — OP THERAPY DAILY TREATMENT
Outpatient Speech Therapy  DAILY TREATMENT     [facility]  [address] [place] [address]  Phone:  [phone]  Fax:  [phone]    Date: 01/29/2024    Patient: Augusto Montemayor Jr.  YOB: 1945  MRN: [id number]     Time Calculation    Start time: 1430  Stop time: 1525 Time Calculation (min): 55 minutes         Chief Complaint: Hypokinetic Parkinsonian dysphonia    Visit #: 19/20    Subjective Evaluation  Pt was alert and cooperative for the session. He was on time for the session and was accompanied by his wife, Samantha, who remained in the waiting room during the session. Pt verbalized he was having depressive episodes and reflected on his end of life as related to his Parkinson’s diagnosis. Motivational Interviewing was used to facilitate further communication on the topic. Counseling was provided on Parkinson’s disease and suggestions on ways to self motivate with small enjoyable tasks were provided.     Speech Therapy Objective   1. Pt was educated on previous skilled ST intervention goals from last semester. Pt stated that goals continue to be aligned with his personal goals for speech.     2. Baseline data was obtained for Conversational speech in 13 samples with an average of 62.5 dB  Monitored Conversation Exercise  Reading Time Average Peak Peak SPL   1 0.07 min 61.6 dB 61.6   2 1.14 min 60.7 dB 60.7   3 0.12 min 61.6 dB 61.6   4 0.38 min 64.3 dB 64.3   5 0.08 min 62.5 dB 62.5   6 0.07 min 66.0 dB 66.0   7 0.24 min 60.7 dB 60.7   8 0.10 min 63.2 dB 60.3   Overall Averages  62.6 dB    Standard Deviation  1.8    Targets 25 70      3. Sustained phonation of ‘ah’ for 5/5 trials with an average duration of 72.8 dB, pitch range gliding High exercises for 3/3 trials with an average of 70.0 dB, and pitch range gliding Low exercises for 4/4 trials with an average 72.9 dB. Microphone impeded data collection of desired 5 trials for certain exercises.      4. Baseline data was obtained for reading aloud of functional sentences previously selected by the Pt as shown below:    -- I love you     -- Get out of there!     -- It looks terrible in here.     -- You're a nice pamela.     -- Honey, can you please help me button these?     -- Can you turn the volume down please?     -- What is that for?    -- I would like the House Special Chow Mein.     -- I'm okay with just water.     -- Honey, we gotta cut this off.     Pt was independent for all sentences. He was able to produce 10/10 sentences without modeling. The computer program only identified 2/10 sentences with an average peak of 61.0 dB. Further data gathering will be necessary upon the next session. Feedback was provided to speak with intent, be loud, and slow down when reading.    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 His diagnosis of mild hypokinetic dysarthria characterized by weak breathy voice, low volume, and variable speech rate.    Speech Therapy Plan  Pt will not be present at his regularly scheduled appointment due to an Opthomologist appointment treating macular degeneration and will return to intervention on 2/12/2024. Visual reminders to increase his loudness will continue to be utilized to help him independently monitor his dB. Verbal reminders throughout the activity will continue to be implemented. Encouragement to pursue tasks that bring self-fulfillment will persist. Tongue strength and endurance baseline measures will be taken on next visit with the [facility].     This is visit 19/20.

## 2024-02-12 ENCOUNTER — SPEECH THERAPY (OUTPATIENT)
Dept: SPEECH THERAPY | Facility: OTHER | Age: 79
End: 2024-02-12
Payer: MEDICARE

## 2024-02-12 DIAGNOSIS — G31.83 PARKINSON'S DISEASE, LEWY BODY (HCC): ICD-10-CM

## 2024-02-12 DIAGNOSIS — F02.80 PARKINSON'S DISEASE, LEWY BODY (HCC): ICD-10-CM

## 2024-02-12 PROCEDURE — 92507 TX SP LANG VOICE COMM INDIV: CPT | Mod: GN,GC | Performed by: SPEECH-LANGUAGE PATHOLOGIST

## 2024-02-20 NOTE — OP THERAPY PROGRESS SUMMARY
"  Outpatient Speech Therapy  RE-CERTIFICATION  NOTE      [facility]ology [facility] Audiology  [address]  [place] [address]  Phone:  [phone]  Fax:  [phone]    Date of Visit: 02/12/2024    Patient: Augusto Montemayor Jr.  YOB: 1945  MRN: [id number]     Referring Provider: MORGAN Garvey  [address]  [address]  [place] [address]   Referring Diagnosis Parkinson's disease; Lewy body dementia      Visit #: 20/20    Progress Report Period: 01/29/2024-02/12/2024    Time Calculation    Start time: 1430  Stop time: 1525 Time Calculation (min): 55 minutes         Chief Complaint: Speech Therapy    Visit Diagnoses     ICD-10-CM   1. Parkinson's disease, Lewy body (HCC)  G31.83    F02.80       Subjective Evaluation  The note below serves as a record of service provided on 2/12/24 (see \"current data\" as gathered on this date of service). Pt has excellent attendance (2/2 scheduled sessions attended) to speech therapy so far. Pt arrived on time today to his therapy session accompanied by his wife, who remained in the waiting room for the entire duration of the session. He reported he was having a very rough couple of weeks due to family complications and high stress. He also noted he has had an increasingly difficult time remembering things since the last session.    Speech Therapy Objective  Long-Term Goal: Pt will demonstrate carryover from therapy to his home environment as reported by his wife.  Short-Term Goal 1: Pt will increase his average loudness in conversation to 75-80 dB in two consecutive sessions.   Baseline Data 01/29/2024: Pt's average loudness during unstructured conversation was 62.5 dB.  Current Data 02/12/2024: Pt's average loudness during unstructured conversation was 63.3 dB.  Reading Time Average Peak Peak SPL   1 1.28 min 62.8 dB 63.3 dB   2 2.40 min 66.7 dB 75.7 dB   3 2.07 min 62.1 dB 64.4 dB   4 3.38 min 62.6 dB 61.3 dB   5 6.06 min 62.3 dB 60.5 " "dB   Overall Averages  63.3 dB    Standard Deviation  1.9    Targets 25.00 70 dB      Status: Pt has demonstrated slight goal advancement within the area of increasing conversational loudness from last semester. He needs visual and verbal prompts in order to maintain loudness and does not typically modulate his own loudness independently. Pt has fluctuated in loudness in the sessions and that often correlates to how he feels that day. Visual prompts were utilized throughout the semester (LSVT dB meter) to support increased loudness during conversation and functional sentence exercises. Pt was instructed to \"speak with intent\" after he expressed that he feels using a loud voice “sounds like he is yelling”. Continued water intake was encouraged during each session to help prevent vocal fatigue.   Goal not yet met.      Short-Term Goal 2: Pt will maintain his average loudness at 80 dB during sustained phonation and simultaneous cognitive tasks.   Baseline Data: Goal not targeted during treatment period.   Current Data: Goal not targeted during treatment period.  Status: Goal not yet met.     Sustained phonation of ‘ah’, without simultaneous cognitive tasks, for 15/15 trials was measured with an average duration of 73.8 dB, pitch range gliding High exercises for 15/15 trials with an average of 70.1 dB, and pitch range gliding Low exercises for 9/15 trials with an average 74.2 dB. Microphone impeded data collection of remaining desired 6 trials for pitch range gliding Low exercise.    Short-Term Goal 3: Pt will demonstrate carryover of taught strategies in a group session with 90% accuracy and minimal verbal cueing.    Pt would continue to benefit from group-based therapy, though it was not available for this treatment as there are not enough participants at this time.       It should be noted that there have only been 2 sessions (1/29/24 and 2/12/24) as it is the beginning of the clinical semester. Isolated voice tasks " "like the ones above show increased loudness compared to conversation.     The self-generated functional sentences Pt generated in the last treatment period are listed below:   1. I love you  2. Get out of there!  3. It looks terrible in here.  4. You're a nice pamela.  5. Honey, can you please help me button these?  6. Can you turn the volume down please?  7. What is that for?  8. I would like the House Special Chow Margretn.  9. I'm okay with just water.  10. Honey, we gotta cut this off.    Baseline Data: Pt was independent for all sentences. He was able to produce 10/10 sentences without modeling. The computer program only identified 2/10 sentences with an average peak of 61.0 dB. Further data gathering will be necessary upon the next session. Feedback was provided to speak with intent, be loud, and slow down when reading.  Current data: Pt was independent for all sentences. He was able to produce 10/10 sentences without modeling. Feedback and verbal cues were provided to speak with intent, be loud, and slow down when reading.  To increase conversational loudness, the sentences above should be practiced with a loud voice while speaking with intent. A home exercise program (HEP) was provided for Pt to practice sustained vowel phonation \"ah\", low to high pitch glide of \"ah\", and high to low pitch glide of \"ah\" while at home. Directions for how to access an online dB meter were also provided for him, last semester, to track his loudness at home without the aid of a clinician while he is not in therapy.  Goal not yet met.    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 His diagnosis of mild hypokinetic dysarthria characterized by weak breathy voice, low volume, and variable speech rate. Pt underwent a Videofluoroscopic Swallow Study by this same SLP effective 12/29/2023. The study resulted in the finding of a large cricopharyngeal (CP) bar " "at the level of his UES obstructing flow which contributes to retrograde flow back into the pharynx. He has been referred to GI for this.     Speech Therapy Plan  Pt would continue to benefit from skilled ST intervention at this time due to the degenerative nature of his diseases. Visual reminders to increase his loudness will continue to be utilized to help him independently monitor his dB. Verbal and visual reminders throughout the activity will continue to be implemented. Encouragement to pursue tasks that bring self-fulfillment will persist. Tongue strength and endurance baseline measures will be taken on next visit with the [facility].  Functional Assessment Used     Prognosis & Recommendations  Impression Summary:  Long term and short term goals have been updated moving into the Spring 2024 semester. Please see below for modifications.   Prognosis:  Good    Goals  Short Term Goals:   STG 1: Pt will increase his average loudness in conversation to 75-80 dB in two consecutive sessions.   STG 2: Pt will maintain his average loudness at 80 dB during sustained phonation during simultaneous cognitive tasks.    STG 3: Pt will demonstrate carryover of taught strategies in a group session with 90% accuracy and minimal verbal cueing.       Short Term Goal Duration (Weeks):  2-4 months  Long Term Goals:  Pt will demonstrate carryover of an \"intentional voice\" from therapy to his home environment as reported by his wife across 3 consecutive sessions.  Long Term Goal Duration (Weeks):  4-6 months  Goal Comments: Pt would benefit from a \"refresher\" round of either LSVT or SPEAK OUT to improve his baseline motor speech.   Frequency:  1x week  Duration (in visits):  20    This is visit 20/20. This is a recertification note. Please re-certify patient for another 20 visits.       Referring provider co-signature:  I have reviewed this plan of care and my co-signature certifies the need for services.    Certification Period: 02/12/2024 " to 05/26/24    Physician Signature: ________________________________ Date: ______________

## 2024-02-26 ENCOUNTER — APPOINTMENT (OUTPATIENT)
Dept: SPEECH THERAPY | Facility: OTHER | Age: 79
End: 2024-02-26
Payer: MEDICARE

## 2024-03-04 ENCOUNTER — APPOINTMENT (OUTPATIENT)
Dept: SPEECH THERAPY | Facility: OTHER | Age: 79
End: 2024-03-04
Payer: MEDICARE

## 2024-03-08 ENCOUNTER — SPEECH THERAPY (OUTPATIENT)
Dept: SPEECH THERAPY | Facility: OTHER | Age: 79
End: 2024-03-08
Payer: MEDICARE

## 2024-03-08 DIAGNOSIS — F02.80 PARKINSON'S DISEASE, LEWY BODY (HCC): ICD-10-CM

## 2024-03-08 DIAGNOSIS — G31.83 PARKINSON'S DISEASE, LEWY BODY (HCC): ICD-10-CM

## 2024-03-08 PROCEDURE — 92507 TX SP LANG VOICE COMM INDIV: CPT | Mod: GN,GC | Performed by: SPEECH-LANGUAGE PATHOLOGIST

## 2024-03-11 ENCOUNTER — SPEECH THERAPY (OUTPATIENT)
Dept: SPEECH THERAPY | Facility: OTHER | Age: 79
End: 2024-03-11
Payer: MEDICARE

## 2024-03-11 DIAGNOSIS — G31.83 PARKINSON'S DISEASE, LEWY BODY (HCC): ICD-10-CM

## 2024-03-11 DIAGNOSIS — F02.80 PARKINSON'S DISEASE, LEWY BODY (HCC): ICD-10-CM

## 2024-03-11 PROCEDURE — 92507 TX SP LANG VOICE COMM INDIV: CPT | Mod: GN,GC | Performed by: SPEECH-LANGUAGE PATHOLOGIST

## 2024-03-11 NOTE — OP THERAPY DAILY TREATMENT
Outpatient Speech Therapy  DAILY TREATMENT     [facility] Speech Pathology & Audiology  [address] [address]  Phone:  [phone]  Fax:  [phone]    Date: 03/08/2024    Patient: Augusto Montemayor Jr.  YOB: 1945  MRN: [id number]     Time Calculation    Start time: 1408  Stop time: 1455 Time Calculation (min): 47 minutes         Chief Complaint: Speech Therapy    Visit #: 21    Subjective Evaluation  Pt was alert and cooperative for the session. He was late for the session and was accompanied by his wife, Samantha, who remained in the waiting room during the session. Pt has not been seen in 2 weeks due to weather and his recent stint with COVID-19. Pt reported that he has had decreased energy as of recent which he attributes to his recent illness with COVID-19. He verbalized he had significantly increased difficulty with his memory loss as related to his Parkinson’s diagnosis. He reported having a rough couple of weeks due to his illness along with family complications and stress.     Speech Therapy Objective   1. Pt discussed events that have happened since his last session on 02/12/2024. He noted that he does not remember the suggestions given to him in the last session regarding social enjoyment.      2. Data was obtained for Conversational speech in 6 samples with an average of 64.3 dB. Verbal and visual cues were provided throughout the session to speak with intent and speak louder.     Monitored Conversation Exercise    Reading Time Average Peak Peak SPL   1 1.19 min 67.4 dB 67.0 dB   2 0.31 min 65.6 dB 64.2 dB   3 0.22 min 63.1 dB 65.0 dB   4 0.17 min 63.7 dB 64.5 dB   5 1.06 min 62.9 dB 61.8 dB   6 0.37 min 62.9 dB 63.2 dB   Overall Average  64.3 dB    Standard Deviation  1.8         3. Sustained phonation of ‘ah’ for 14/15 trials with an average duration of 75.5 dB, pitch range gliding High exercises for 15/15 trials with an average of 73.9 dB, and pitch range  gliding Low exercises for 15/15 trials with an average 74.1 dB. Microphone impeded data collection of 1/15 trials for sustained phonation.      4. Baseline data was obtained for reading aloud of functional sentences previously selected by the Pt as shown below:    -- I love you     -- Get out of there!     -- It looks terrible in here.     -- You're a nice pamela.     -- Honey, can you please help me button these?     -- Can you turn the volume down please?     -- What is that for?    -- I would like the House Special Chow Margretn.     -- I'm okay with just water.     -- Honey, we gotta cut this off.      Pt was independent for all sentences. He was able to produce 10/10 sentences without modeling. Pt produced 5 trials of all 10 phrases with an average peak of 64.2 dB. Feedback was provided to speak with intent, be loud, and slow down when reading.     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 His diagnosis of mild hypokinetic dysarthria characterized by weak breathy voice, low volume, and variable speech rate.    Speech Therapy Plan  Pt will continue to benefit from skilled ST intervention at this time due to the degenerative nature of his diseases. Visual reminders to increase his loudness will continue to be utilized to help him independently monitor his dB. Verbal and visual reminders throughout the activity will continue to be implemented. Encouragement to pursue tasks that bring self-fulfillment will persist. Tongue strength and endurance baseline measures will be taken on next visit with the IOPI.    This is visit 21/40.

## 2024-03-14 NOTE — OP THERAPY DAILY TREATMENT
Outpatient Speech Therapy  DAILY TREATMENT     [facility] Speech Pathology & Audiology  [address] [facility] [address]  Phone:  [phone]  Fax:  [phone]    Date: 03/11/2024    Patient: Augusto Montemayor Jr.  YOB: 1945  MRN: [id number]     Time Calculation    Start time: 1430  Stop time: 1525 Time Calculation (min): 55 minutes         Chief Complaint: Speech Therapy    Visit #: 22/40    Subjective Evaluation  Pt was alert and cooperative for the session. He was on time for the session and was accompanied by his daughter, who remained in her car during the session. Pt reported that he had more energy than last session and has recovered from COVID. He mentioned attending the Parkinson’s boxing club and mentioned that those peers have a difficult time hearing each other. Pt mentioned that he may be interested in the dilation of his esophagus again due to some choking when swallowing pills again. Clinicians suggested for him to speak to his primary care physician.     Speech Therapy Objective   1. Pt discussed events that have happened since his last session on 03/08/2024. He noted that he does not remember the suggestions given to him in the last session regarding social enjoyment.      2. [place] [employer] (IOPI) baseline data was recorded to measure tongue strength:    1 2 3 Averages   Anterior Tongue 42 54 50 48.66   Posterior Tongue 44 44 46 44.67   Swallow Pressure 9 17 14 13.33   Mean Average Peak - Average Swallow Pressure = Functional Reserve  48.66 - 13.33 = 35.33 (Functional Reserve)    2. Data was obtained for Conversational speech in 14 samples with an average of 63.4 dB. Verbal and visual cues were provided throughout the session to speak with intent and speak louder.   Monitored Conversation Exercise    Time Average Peak Peak SPL    1  0.14  63.3  60.2    2  0.26  63.8  63.8    3  0.18  62.3  64.2    4  0.36  60.7  60.6    5  0.20  65.3  64.5    6   0.22  63.9  66.6    7  0.13  61.1  60.6    8  0.53  62.7  60.3    9  0.21  65.1  63.7    10  0.00  62.3  62.3    11  0.32  63.9  65.2    12  0.19  64.5  62.8    13  0.31  63.7  62.2    14  0.50  64.9  63.3    Overall Average    63.4      Standard Deviation   1.4      3. Sustained phonation of ‘ah’ for 15/15 trials with an average duration of 78.0 dB, pitch range gliding High exercises for 15/15 trials with an average of 72.4 dB, and pitch range gliding Low exercises for 15/15 trials with an average 73.6 dB.     4. Data was obtained for reading aloud of functional sentences previously selected by the Pt as shown below:    -- I love you     -- Get out of there!     -- It looks terrible in here.     -- You're a nice pamela.     -- Honey, can you please help me button these?     -- Can you turn the volume down please?     -- What is that for?    -- I would like the House Special Chow Margretn.     -- I'm okay with just water.     -- Honey, we gotta cut this off.      Pt was independent for all sentences. He was able to produce 10/10 sentences without modeling. Pt produced 5 trials of all 10 phrases with an average peak of 69.9 dB. Feedback was provided to speak with intent, be loud, and slow down when reading.    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 His diagnosis of mild hypokinetic dysarthria characterized by weak breathy voice, low volume, and variable speech rate.    Speech Therapy Plan  Pt will continue to benefit from skilled ST intervention at this time due to the degenerative nature of his diseases. Visual reminders to increase his loudness will continue to be utilized to help him independently monitor his dB. Verbal and visual reminders throughout the activity will continue to be implemented. Encouragement to pursue tasks that bring self-fulfillment will persist and follow up on social activities.       This is visit 22/40.

## 2024-03-18 ENCOUNTER — SPEECH THERAPY (OUTPATIENT)
Dept: SPEECH THERAPY | Facility: OTHER | Age: 79
End: 2024-03-18
Payer: MEDICARE

## 2024-03-18 DIAGNOSIS — G31.83 PARKINSON'S DISEASE, LEWY BODY (HCC): ICD-10-CM

## 2024-03-18 DIAGNOSIS — F02.80 PARKINSON'S DISEASE, LEWY BODY (HCC): ICD-10-CM

## 2024-03-18 PROCEDURE — 92507 TX SP LANG VOICE COMM INDIV: CPT | Mod: GN,GC | Performed by: SPEECH-LANGUAGE PATHOLOGIST

## 2024-03-19 NOTE — OP THERAPY DAILY TREATMENT
Outpatient Speech Therapy  DAILY TREATMENT     [facility] Speech Pathology & Audiology  [address] [facility] [address]  Phone:  [phone]  Fax:  [phone]    Date: 03/18/2024    Patient: Augusto Montemayor Jr.  YOB: 1945  MRN: [id number]     Time Calculation    Start time: 1430  Stop time: 1525 Time Calculation (min): 55 minutes         Chief Complaint: Speech Therapy    Visit #: 23/40    Subjective Evaluation  Pt was alert and cooperative for the session. He was on time for the session and was accompanied by his wife, Samantha, who remained in the waiting room throughout the entire session. Pt reports that he is having an increasingly difficult time remembering events that have happened recently. He stated he could not remember what he did over the weekend. When asked about his recent social interactions, he reported attending the Parkinson’s boxing club, [employer]. Pt noted having a difficult time performing the physical exercises that are done during the boxing sessions and that he was embarrassed about not being able to do the exercises to the same degree as his peers. Pt questioned the point of coming to therapy, what the benefit of therapy was, as well as his fears regarding the effects of his Parkinson’s diagnosis. He discussed a past seizure history and that currently, he had experienced some episodes of 1-2 visual color images, stumbling, and confusion but those events could not be qualified as seizures. Per Samantha, he is scheduled for an electroencephalogram (EEG) on 3/19/2024 and will inform clinicians on results upon the next session. Motivational Interviewing was used to facilitate further communication on the topic. Counseling was provided on Parkinson’s disease and suggestions on ways to self motivate with small enjoyable tasks were provided.     Speech Therapy Objective   1. Data was obtained for Conversational speech in 10 samples with an average of 65.4  Pt called again stating she has no refills and needs this called in please          Pt said that pharmacy did not have order for refill. Pt has two puffs left.   Pt needs refill on Trelegy Ellipta 100-62.5-25 MCG/ACT Inhalation Aerosol Powder Breath Activated: Inhale 1 PuffS Daily      [facility] [phone]   dB. Verbal and visual cues were provided throughout the session to speak with intent and speak louder.   Monitored Conversation Exercise   Trial Time Avg Peak Peak SPL   1 2.32 min 63.2 dB 77.5 dB SPL   2 0.25 min 75.3 dB 63.6 dB SPL   3 0.32 min 62.9 dB 62.1 dB SPL   4 1.03 min 64.8 dB 69.5 dB SPL   5 0.23 min 68.5 dB 74.1 dB SPL   6 0.11 min 65.0 dB 64.8 dB SPL   7 0.18 min 66.0 dB 67.5 dB SPL   8 0.14 min 62.0 dB 64.8 dB SPL   9 1.11 min 64.3 dB 66.1 dB SPL   10 0.35 min 63.0 dB 60.9 dB SPL   Averages  65.4 dB    Standard Deviation  4.0    Target  70 dB      2. Sustained phonation of ‘ah’ for 15/15 trials with an average duration of 78.5 dB, pitch range gliding High exercises for 15/15 trials with an average of 75.3 dB, and pitch range gliding Low exercises for 15/15 trials with an average 75.9 dB.      3. Data was obtained for reading aloud of functional sentences previously selected by the Pt as shown below:    -- I love you     -- Get out of there!     -- It looks terrible in here.     -- You're a nice pamela.     -- Honey, can you please help me button these?     -- Can you turn the volume down please?     -- What is that for?    -- I would like the House Special Chow Mein.     -- I'm okay with just water.     -- Honey, we gotta cut this off.     Pt was independent for all sentences. He was able to produce 10/10 sentences without modeling. Pt produced 5 trials of all 10 phrases with an average peak of 70.8 dB. Feedback was provided to speak with intent, be loud, and slow down when reading.    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 His diagnosis of mild hypokinetic dysarthria characterized by weak breathy voice, low volume, and variable speech rate.     Speech Therapy Plan  Pt will continue to benefit from skilled ST intervention at this time due to the degenerative nature of his diseases. Visual reminders to increase his  loudness will continue to be utilized to help him independently monitor his dB. Verbal and visual reminders throughout the activity will continue to be implemented. Encouragement to pursue tasks that bring self-fulfillment will persist and follow up on social activities. Modifications to pt’s self-generated functional sentences will be made to better represent his common everyday utterances.     This is visit 23/40.

## 2024-03-25 ENCOUNTER — APPOINTMENT (OUTPATIENT)
Dept: SPEECH THERAPY | Facility: OTHER | Age: 79
End: 2024-03-25
Payer: MEDICARE

## 2024-04-01 ENCOUNTER — APPOINTMENT (OUTPATIENT)
Dept: SPEECH THERAPY | Facility: OTHER | Age: 79
End: 2024-04-01
Payer: MEDICARE

## 2024-04-01 DIAGNOSIS — F02.80 PARKINSON'S DISEASE, LEWY BODY (HCC): ICD-10-CM

## 2024-04-01 DIAGNOSIS — G31.83 PARKINSON'S DISEASE, LEWY BODY (HCC): ICD-10-CM

## 2024-04-01 PROCEDURE — 92507 TX SP LANG VOICE COMM INDIV: CPT | Mod: GN,GC | Performed by: SPEECH-LANGUAGE PATHOLOGIST

## 2024-04-06 NOTE — OP THERAPY DAILY TREATMENT
Outpatient Speech Therapy  DAILY TREATMENT     [facility] Speech Pathology & Audiology  [address] [address]  Phone:  [phone]  Fax:  [phone]    Date: 04/01/2024    Patient: Augusto Montemayor Jr.  YOB: 1945  MRN: [id number]     Time Calculation    Start time: 1430  Stop time: 1525 Time Calculation (min): 55 minutes         Chief Complaint: Speech Therapy    Visit #: 24/40    Subjective Evaluation  Pt was alert and cooperative for the session. He was on time for the session and was accompanied by his wife, Samantha, who remained in the waiting room throughout the entire session. Patient shuffled from the waiting room to the therapy room and demonstrated little movement when picking up his feet when walking. Pt acknowledged that he is shuffling more than he normally does. Pt reports that he is having an increasingly difficult time remembering events that have happened recently. When asked about his recent social interactions, he reported attending the Parkinson’s boxing club, [employer], over the break and noted his intentions to continue attending. Pt noted that he was moved to an introductory class for boxing as he previously reported having a difficult time performing the physical exercises that are done during the boxing sessions. He noted that while the class is more suitable to him, he is still feeling embarrassed about not being able to do the exercises to the same degree as his peers. When speaking about updates regarding his health, pt noted that he had an electroencephalogram (EEG) performed on 3/19/2024 and was evaluated for Epilepsy, though he has not received the results. Pt also noted that over the last couple days, he has lost his voice multiple times. When he was asked to elaborate, he reported that there were multiple instances that he would speak and “nothing would come out” or he would be speaking at a whisper.     Speech Therapy Objective    1.  Data was obtained for Conversational speech in 9 samples with an average of 64.3 dB. Verbal and visual cues were provided throughout the session to speak with intent and speak louder. Clinicians suggested the pt think about what he wants to say prior to speaking in an effort to be more intentional with his speech and volume.      Monitored Conversation Exercise    Reading  Time   Average Peak  Peak SPL    1 1.01 min  63.8 dB  65.9 dB    2  0.38 min  65.1 dB  70.3 dB    3  0.35 min  64.6 dB  61.3 dB    4  0.35 min  66.0 dB  61.1 dB    5  0.23 min  62.1 dB  60.6 dB    6  0.39 min  65.0 dB  60.0 dB    7  1.06 min  62.8 dB  62.9 dB    8  0.38 min  66.1 dB  60.3 dB    9  0.44 min  62.7 dB  62.6 dB    Overage Average   64.3 dB     Standard Deviation   1.5      Target   70      2. Sustained phonation of ‘ah’ for 15/15 trials with an average duration of 81.4 dB, pitch range gliding High exercises for 5/15 trials with an average of 74.7 dB.  Microphone impeded full data collection of desired 15 trials for gliding high exercise. Pitch range gliding Low exercises were completed for 15/15 trials with an average 78.0 dB.      3. Data was obtained for reading aloud of 10 updated functional sentences previously changed by the Pt as shown below:  1. I love you   2. Get out of there!   3. It looks terrible in here.   4. You're a nice pamela.   5. Honey, can you button these?   6.Can you turn the volume down please?   7. What is that for?   8. I’ll take chow mein.   9. I'm okay with just water.   10. Honey, we gotta cut this off.     Pt was independent for all sentences. He was able to produce 10/10 sentences without modeling. Pt produced 5 trials of all 10 phrases with an average peak of 66.5 dB. Feedback was provided to speak with intent, be loud, and slow down when reading.    4. Conversational speech was exercised at the end of the session where patient questioned the purpose and benefit of therapy. Motivational interviewing  "and education was provided to address the topic. The \"Jung Says\" game was utilized with the objective of encouraging the patient to project their voice and to integrate movement as the patient noted having a difficult time moving after sitting for an extended period of time.    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 His diagnosis of mild hypokinetic dysarthria characterized by weak breathy voice, low volume, and variable speech rate.    Speech Therapy Plan  Pt will continue to benefit from skilled ST intervention at this time due to the degenerative nature of his diseases. Visual reminders to increase his loudness will continue to be utilized to help him independently monitor his dB. Verbal and visual reminders throughout the activity will continue to be implemented. Encouragement to pursue tasks that bring self-fulfillment will persist and follow up on social activities.  Future sessions will integrate LSVT BIG principles in an effort to make sessions more physically interactive as the pt noted he has a hard time sitting for an extended period of time.      This is visit 24/40.  "

## 2024-04-08 ENCOUNTER — APPOINTMENT (OUTPATIENT)
Dept: SPEECH THERAPY | Facility: OTHER | Age: 79
End: 2024-04-08
Payer: MEDICARE

## 2024-04-15 ENCOUNTER — APPOINTMENT (OUTPATIENT)
Dept: SPEECH THERAPY | Facility: OTHER | Age: 79
End: 2024-04-15
Payer: MEDICARE

## 2024-04-15 DIAGNOSIS — F02.80 PARKINSON'S DISEASE, LEWY BODY (HCC): ICD-10-CM

## 2024-04-15 DIAGNOSIS — G31.83 PARKINSON'S DISEASE, LEWY BODY (HCC): ICD-10-CM

## 2024-04-15 PROCEDURE — 92507 TX SP LANG VOICE COMM INDIV: CPT | Mod: GN,GC | Performed by: SPEECH-LANGUAGE PATHOLOGIST

## 2024-04-18 NOTE — OP THERAPY DAILY TREATMENT
Outpatient Speech Therapy  DAILY TREATMENT     [facility] Speech Pathology & Audiology  [address] [address]  Phone:  [phone]  Fax:  [phone]    Date: 04/15/2024    Patient: Augusto Montemayor Jr.  YOB: 1945  MRN: [id number]     Time Calculation    Start time: 1430  Stop time: 1525 Time Calculation (min): 55 minutes         Chief Complaint: Speech Therapy    Visit #: 25    Subjective Evaluation  Pt was alert and cooperative for the session. He was on time for the session and was accompanied by his wife, Samantha, who remained in the waiting room throughout the entire session. Pt shuffled from the waiting room to the therapy room and demonstrated little movement when picking up his feet when walking. Pt reports that he is having an increasingly difficult time remembering events that have happened recently. When asked about his recent social interactions, he reported attending the Parkinson’s boxing club 2x a week at [employer]. Pt noted that he continues to enjoy the introductory class for boxing as he believes it is a better speed and skill level for him. He has been enjoying working around his home with daily tasks like shoveling snow.    Speech Therapy Objective  1.  Data was obtained for Conversational speech in 9 samples with an average of 63.1 dB. Verbal and visual cues were provided throughout the session to speak with intent and speak louder. Pt noted that he noticed a decrease in awareness to visual cues provided by clinicians to increase speaking volume. Clinicians reminded the pt to think about what he wants to say prior to speaking in an effort to be more intentional with his speech and volume.     Monitored Conversation Exercise   Reading Time Average Peak Peak SPL   1 0.47 min 63.0 dB 65.9 dB   2 1.35 min 62.1 dB 66.4 dB   3 0.58 min 64.8 dB 60.6 dB   4 1.05 min 63.7 dB 67.8 dB   5 2.00 min 62.6 dB 60.4 dB   6 0.54 min 62.5 dB 62.8 dB   7 1.28 min  63.2 dB 62.9 dB   8 1.29 min 63.6 dB 62.7 dB   9 1.13 min 62.7 dB 60.5 dB   Overage Average  63.1 dB    Standard Deviation  0.8    Target  70       2. A total of 15/15 trials with an average duration of 78.8 dB were collected for sustained phonation of ‘ah’. It was observed by the clinicians that Pt put hands up to mouth to assist him in projecting his voice while performing this exercise. Pt did not realize he was doing this.     3. Pt was instructed to stand prior to the remainder of the LSVT exercises. This technique was utilized with the objective of encouraging the Pt to project their voice and to integrate physical awareness. A total of 15/15 trials were performed for gliding exercises of High to Low as well as a total of 15/15 trials of gliding exercises of Low to High. Microphone impeded full data collection for each of the 15 trials for gliding exercises.      4. Updated 10 functional sentences were read aloud by the Pt and are shown below:  1. I love you   2. Get out of there!   3. It looks terrible in here.   4. You're a nice pamela.   5. Honey, can you button these?   6.Can you turn the volume down please?   7. What is that for?   8. I’ll take chow mein.   9. I'm okay with just water.   10. Honey, we gotta cut this off.      Pt was independent for all sentences. He was able to produce 10/10 sentences without modeling and successfully produced 5 trials of all 10 functional sentences. To further encourage physical awareness, the Pt was instructed by clinicians to walk around the therapy room table each time a trial of the 10 sentences were performed. Pt reported that this was helpful in both projecting his voice and assisted him in reducing rigidity from sitting for a long period of time.  Microphone impeded full data collection for each of the 5 trials. Feedback was provided to speak with intent, be loud, and slow down when reading.    Assessments  Pt presents with mild hypokinetic dysarthria secondary to  his Parkinson’s disease and Lewy body dementia diagnoses. His diagnosis of mild hypokinetic dysarthria characterized by weak breathy voice, low volume, and variable speech rate.    Speech Therapy Plan  Pt will continue to benefit from skilled ST intervention at this time due to the degenerative nature of his diseases. Visual reminders to increase his loudness will continue to be utilized to help him independently monitor his dB. Verbal and visual reminders throughout the activity will continue to be implemented. Encouragement to pursue tasks that bring self-fulfillment will persist and follow up on social activities.  Future sessions will continue integrate LSVT BIG principles in an effort to make sessions more physically interactive.     This is visit 25/40.           [x] As the licensed therapist supervising this student, I was present during the entire treatment session directing the care and reviewing the assessment plan.  I reviewed all documentation prior to signing.

## 2024-04-22 ENCOUNTER — APPOINTMENT (OUTPATIENT)
Dept: SPEECH THERAPY | Facility: OTHER | Age: 79
End: 2024-04-22
Payer: MEDICARE

## 2024-04-22 DIAGNOSIS — G31.83 PARKINSON'S DISEASE, LEWY BODY (HCC): ICD-10-CM

## 2024-04-22 DIAGNOSIS — F02.80 PARKINSON'S DISEASE, LEWY BODY (HCC): ICD-10-CM

## 2024-04-22 PROCEDURE — 92507 TX SP LANG VOICE COMM INDIV: CPT | Mod: GN,GC | Performed by: SPEECH-LANGUAGE PATHOLOGIST

## 2024-04-29 ENCOUNTER — APPOINTMENT (OUTPATIENT)
Dept: SPEECH THERAPY | Facility: OTHER | Age: 79
End: 2024-04-29
Payer: MEDICARE

## 2024-04-29 DIAGNOSIS — R49.0 HYPOKINETIC PARKINSONIAN DYSPHONIA (HCC): ICD-10-CM

## 2024-04-29 DIAGNOSIS — F02.80 PARKINSON'S DISEASE, LEWY BODY (HCC): ICD-10-CM

## 2024-04-29 DIAGNOSIS — G20.A1 HYPOKINETIC PARKINSONIAN DYSPHONIA (HCC): ICD-10-CM

## 2024-04-29 DIAGNOSIS — G31.83 PARKINSON'S DISEASE, LEWY BODY (HCC): ICD-10-CM

## 2024-04-29 PROCEDURE — 92507 TX SP LANG VOICE COMM INDIV: CPT | Mod: GN,GC | Performed by: SPEECH-LANGUAGE PATHOLOGIST

## 2024-05-01 NOTE — OP THERAPY DAILY TREATMENT
Outpatient Speech Therapy  DAILY TREATMENT     [facility] Speech Pathology & Audiology  [address] [facility] [address]  Phone:  [phone]  Fax:  [phone]    Date: 04/22/2024    Patient: Augusto Montemayor Jr.  YOB: 1945  MRN: [id number]     Time Calculation    Start time: 1430  Stop time: 1525 Time Calculation (min): 55 minutes         Chief Complaint: Speech Therapy    Visit #: 26/40    Subjective Evaluation  Pt was alert and cooperative for the session. He was on time for the session and was accompanied by his wife, Samantha, who remained in the waiting room throughout the entire session. Pt shuffled from the waiting room to the therapy room and demonstrated little movement when picking up his feet when walking. He mentioned that thorough planning is essential for him to anticipate each step he'll take. Clinicians asked if using a walker would be beneficial, but he expressed concern that it would hinder him or “get in the way.” He reported an increased frustration as he feels that he is “losing his mind”. Pt exemplified the need to inquire about the timing of an appointment from his wife, despite her already having provided the answer shortly prior to his inquiry. Pt also reported him being more confused as of late. Clinicians observed that he continues to not respond to visual cues to increase his voice when speaking.     Speech Therapy Objective   The Cognitive Linguistic Quick Test (CLQT) was administered to gain information about cognitive-linguistic functioning. The test is criterion-referenced and yields severity ratings for 5 cognitive domains: attention, memory, language, executive functions, and visuospatial skills. 8 of the 10 subtests were administered to the client due to time constraints. The remaining 2 subtests will be administered in the next session.     Personal Facts   This task is designed to assess various cognitive functions such as episodic memory, which  involves recalling personal facts, and orientation to place and year. It also evaluates the examinee's ability to communicate these facts through language. The primary cognitive domains assessed are memory, specifically episodic memory, and language, which includes word retrieval, as well as verbal language comprehension and production. The performance on this task provides critical information about the presence of memory and/or orientation issues that may affect new learning and functional activities. It also helps determine the status of recent and longer-term memory, the recall of personally relevant facts, and the ability to communicate these facts effectively. Additionally, the results may indicate the possible existence of a dementing process.     Personal Facts Score  8/8  100%     Symbol Cancellation   This task is designed to assess visual attention, scanning, discrimination, inhibition, and response shifting within quadrants of space, focusing on enhancing the understanding of visual and visuospatial skills, particularly visual perception. The performance of the patient on this task provides valuable information on specific areas where there are visual field problems or visual neglect or inattention. These issues need to be addressed when utilizing visual materials for testing and rehabilitation in everyday functional activities. Additionally, the task helps in evaluating the patient's attention skills which are crucial for new learning and the resumption of previously learned skills. It also assesses the ability to inhibit incorrect responses and to appropriately shift attention, skills that are essential for effective planning and problem solving.     Symbol Cancellation Score  0/12  0%     Confrontation Naming  This task is designed to document anomia, a difficulty in retrieving specific substantive words and a hallmark of aphasia. It involves the naming of objects or pictures presented individually  with the goal to produce a single-word response that correctly labels the stimulus items. The primary cognitive domain evaluated by this task is language, focusing specifically on semantics and phonology. The information gained from the examinee's performance includes evidence of anomia, the cardinal symptom of aphasia, and provides a measure of word-retrieval skills that are crucial for effective communication. Additionally, this task offers an opportunity to analyze errors to uncover potential causes of misnaming and to guide treatment. It also helps in identifying visual perceptual problems that may suggest right-hemisphere damage and/or dementia. Inability to restrict responses to single words is often an indication of neurological dysfunction and may be seen in individuals who have frontal lobe damage with no aphasia or in individuals with fluent forms of aphasia. Delayed responses during the confrontation naming task may be indicative of word-finding problems, or low arousal states. The ability to self-correct indicates the capability to self-monitor and improve responses.     Confrontation Naming Score  10/10  100%     Clock Drawing  This task is designed as a quick gauge to monitor spontaneous recovery, progress during rehabilitation, or declines due to progressive diseases, and it calls upon a variety of cognitive skills, making it a sensitive screening tool for neurological dysfunction. Successful performance necessitates the integration of all cognitive domains mediated by various areas of the brain. The primary cognitive domains evaluated include sustained attention for executing multi-step tasks, memory skills such as working memory to maintain task goals and procedural memory components, executive functions for planning and monitoring activities like drawing a clock and making necessary adjustments, language skills for understanding instructions and retrieving and writing numbers, and visuospatial  skills for forming an internal representation of a clock and executing this mental image as a drawing. Clock drawing/setting, often used in informal assessments, requires the examinee to set the time to “10 minutes after 11,” a specific task that involves placing the long hand so it points to the number 2, resisting the tendency to point it at the number 10--a common error among those with neurological dysfunction. The information gained from the examinee's performance provides insights into several areas: attention, working memory, and executive skills essential for planning and organizing goal-oriented tasks; visual perceptual and motor skills crucial for daily activities; language processing abilities demonstrated through following instructions and writing numbers necessary for effective communication; and the ability to understand, calculate, and represent the concept of time, critical skills for planning, organizing, and executing functional activities such as scheduling appointments and cooking.     Clock Drawing Score  9/13  69%     Story Retelling  This task is designed to assess an examinee's working memory for facts embedded within a story narrative, as well as their auditory processing and verbal production skills. The primary cognitive domains evaluated in this task include attention, verbal working memory, language comprehension, and verbal production. The story retelling task tests for auditory processing and verbal production skills and is composed of two subtasks. In the first subtask, the examinee listened to an unfamiliar story and repeated the story verbatim, a process that has great sensitivity to both memory and language skills. The ability to narrate a story by presenting sequential pieces of information leading to a logical conclusion is a form of discourse, a highly important aspect of communication. In the second subtask, the examinee answers “yes/no” questions. Two questions relating to a  story component must be answered correctly to receive credit, reducing the chance of scoring points by guessing correctly. The performance of the examinee provides valuable insights into several key areas: it measures verbal working memory, which is crucial for processing new information encountered in daily activities; it evaluates the ability to understand a story and produce narratives, skills that are frequently used in everyday communication; it assesses foundational communication skills of language production and comprehension; and it can also serve as an indication of dementia in non-aphasic individuals, contributing to a broader understanding of their cognitive health.     Story Retelling Score  4/10  40%     Symbol Trails  This task is designed to assess key executive functions, specifically working memory, planning, and mental flexibility, without placing heavy demands on the language system. It evaluates several cognitive domains, including attention, executive functions, and visuospatial skills, particularly focusing on visual perception. The ability to make trails by connecting items according to specific directions and within certain time limits calls upon many skills, including attention, complex visual scanning, motor agility and speed, working memory, planning, mental flexibility, and conceptualization. In this task, the examinee is asked to complete two trial items before proceeding to the Scored Item. In trial 1, the examinee is asked to draw lines connecting circles of increasing size. In trial 2, the examinee is asked to draw lines that connect alternating circles and triangles. The scored item involves both size and shape concepts. The performance of the examinee on this task provides valuable insights into various aspects of cognitive functioning. It measures concentration, attention, and the executive skills of planning, working memory, and mental flexibility, which are crucial for  problem-solving and the successful completion of goal-oriented tasks. Additionally, this task offers a method to assess executive functions with minimal language requirements, making it particularly useful for examining individuals with aphasia. It also evaluates visuospatial, perceptual, and motor control skills, which are essential for managing a variety of daily activities.     Symbol Trails Score  5/10  50%     Generative Naming  This task is designed to evaluate the examinee's ability to conduct a systematic mental search for words according to specific semantic and phonological rules and to utilize their working memory skills to remember the rules of the task and the words already named. The primary cognitive domains assessed by this task include working memory, verbal language skills related to semantic and phonological knowledge, and executive functions. This task utilizes verbal fluency and requires quickly listing words and it is regarded as a sensitive measure of productive, flexible thinking. It also requires a disciplined linguistic mental search. It is composed of two subtasks. In the first task, the examinees name as many animals as possible within 1 minute. In the second, examinees name as many words that begin with the letter m as possible within 1 minute. Inability or failure to use common search strategies may result from semantic (animals) or phonological (m words) problems, but, whatever the cause, this failure typically results in lower output that is seen with use of such strategies. The performance of the examinee provides critical information in several areas: it offers a measure of executive word-finding ability, crucial for initiating and maintaining mental searches for nouns, which play a significant role in effective communication; it serves as an indicator of semantic and conceptual skills that are foundational to all language use; it measures phonological knowledge, which is associated  with literacy skills; and it evaluates working memory skills that are essential for successful problem-solving.     Generative Naming Score  6/9  66%     Design Memory  This task is designed to assess immediate or working visual memory with minimal language demands, focusing on individuals who may have aphasia or impaired graphic skills. The primary cognitive domains evaluated include visual attention, visual memory, and visuospatial skills. The performance of the examinee on this task offers valuable insights into several aspects of cognitive functioning. It provides an indicator of the visual attention and memory that are essential for many forms of new learning, encompassing both naturally occurring stimuli and those used in rehabilitation programs. Additionally, this task offers a nonlinguistic method to measure memory skills, making it particularly useful for assessing individuals with language impairments. This brief task of visual discrimination and memory employs nonlinguistic stimuli that are difficult to verbally encode given the task time constraints. Pairs of abstract designs are presented to the examinee, who is then instructed to remember the designs. All stimuli are presented vertically on the page to control for left and right visual field deficits. Thus, when a score below Criterion Cut Score is earned on this task, it is probably due to visual discrimination and/or visual memory problems. This score contributes to the cognitive domain scores for attention, memory, and visuospatial skills.     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 His diagnosis of mild hypokinetic dysarthria characterized by weak breathy voice, low volume, and variable speech rate.    Speech Therapy Plan  Pt will continue to benefit from skilled ST intervention at this time due to the degenerative nature of  his diseases. Visual reminders to increase his loudness will continue to be utilized to help him independently monitor his dB. Verbal and visual reminders throughout the activity will continue to be implemented. Encouragement to pursue tasks that bring self-fulfillment will persist and follow up on social activities.  During the next session, end of the semester progress data will be obtained for the Pt’s conversational speech, sustained phonation, high to low pitch glides, low to high pitch glides, and functional sentences. The remaining two subtests for the The Cognitive Linguistic Quick Test (CLQT) will also be administered and scores will be reported in the progress note. The patient has been referred for the addition of cognitive therapy, which will begin for the Summer semester in June.       This is visit 26/40.         [x] As the licensed therapist supervising this student, I was present during the entire treatment session directing the care and reviewing the assessment plan.  I reviewed all documentation prior to signing.

## 2024-05-01 NOTE — OP THERAPY PROGRESS SUMMARY
"  Outpatient Speech Therapy  PROGRESS SUMMARY NOTE      [facility] Speech Pathology & Audiology  [address] [facility] [address]  Phone:  [phone]  Fax:  [phone]    Date of Visit: 04/29/2024    Patient: Augusto Montemayor Jr.  YOB: 1945  MRN: [id number]     Referring Provider: MORGAN Garvey  [address]  [facility] [address]  [place] [address]   Referring Diagnosis No admission diagnoses are documented for this encounter.      Visit #: 27/40    Progress Report Period: 02/12/24 - 04/29/24    Time Calculation    Start time: 1430  Stop time: 1525 Time Calculation (min): 55 minutes         Chief Complaint: Speech Therapy    Visit Diagnoses     ICD-10-CM   1. Parkinson's disease, Lewy body (HCC)  G31.83    F02.80   2. Hypokinetic Parkinsonian dysphonia (HCC)  G20.A1    R49.0       Subjective Evaluation  The note below serves as a record of service provided on 04/29/24 (see \"current data\" as gathered on this date of service). Pt has attended  8/13 scheduled sessions of skilled ST intervention this semester. Pt arrived on time today to his therapy session accompanied by his wife, who remained in the waiting room for the entire duration of the session.  Pt noted that he was feeling \"turned around\" and \"very confused\" which he attributed to missing his nap earlier in the day. He also noted that he is having a very difficult time remembering things he has said. He states, \"I will tell my wife something then forget it 20 seconds after I said it\".     Speech Therapy Objective  The remaining subtests (Mazes and Design Generation) of the Cognitive Linguistic Quick Test (CLQT) were administered. Results are shown below:  Mazes  This task tests the ability to successfully trace a path through a maze which requires planning, foresight, self-monitoring, mental flexibility, and other executive functions considered to represent the highest level of cognition. This task also requires sustained " attention, good visuospatial and writing (graphomotor) skills, and working memory for maintaining the rules and purpose of the task. In this task, two mazes must be completed. Poor performance may reflect difficulty in remembering directions and finding one’s way in natural environments.       Mazes Score  0/12  0%     Design Generation   This task is designed to help assess the patient’s executive skills of productivity and creativity, the ability to vary responses rapidly, to self monitor, to remember and follow rules, and to develop and use effective strategies. This task is intended to place limited demands on the language system. The main cognitive domains evaluated by this task are attention, executive functions, and visuospatial skills.      Design Generation Score  0/12  0%     The results of performance on the Cognitive Linguistic Quick Test (CLQT) were presented and interpreted to the Pt.     Updated data for voice parameters were obtained as shown below. See “current data” for parameters measured on 4/29/24.     Long-Term Goal: Pt will demonstrate carryover from therapy to his home environment as reported by his wife.  Short-Term Goal 1: Pt will increase his average loudness in conversation to 75-80 dB in two consecutive sessions.   Baseline Data: Pt's average loudness during unstructured conversation was 62.5 dB  Current Data: Pt's average loudness during unstructured conversation was 65.5 dB    Pt has demonstrated slight goal advancement within the area of increasing conversational loudness from last semester. He needs visual and verbal prompts in order to maintain loudness and does not typically modulate his own loudness independently. Pt has fluctuated in loudness in the sessions and his loudness often correlates to how he feels that day. Visual prompts were utilized throughout the semester (LSVT dB meter and hand gestures) to support increased loudness during conversation and functional sentence  "exercises. Pt was instructed to \"speak with intent\" after he expressed that he feels using a loud voice “sounds like he is yelling”.  Status: Goal not met.      Short-Term Goal 2: Pt will maintain his average loudness at 80 dB during sustained phonation and simultaneous cognitive tasks.   Baseline Data: Goal not targeted during treatment period.   Current Data: Goal not targeted during treatment period.  Status: Goal not met.     Sustained Phonation and Pitch Glides  Baseline Data: Sustained phonation of ‘ah’, without simultaneous cognitive tasks, for 15/15 trials was measured with an average duration of 73.8 dB, pitch range gliding High exercises for 15/15 trials with an average of 70.1 dB, and pitch range gliding Low exercises for 9/15 trials with an average 74.2 dB. Microphone impeded data collection of remaining desired 6 trials for pitch range gliding Low exercise.  Current Data: Sustained phonation of ‘ah’, without simultaneous cognitive tasks, for 5/5 trials was measured with an average duration of 76.5 dB, pitch range gliding High exercises for 5/5 trials with an average of 73.1 dB, and pitch range gliding Low exercises for 5/5 trials with an average of 77.2 dB.     Short-Term Goal 3: Pt will demonstrate carryover of taught strategies in a group session with 90% accuracy and minimal verbal cueing.    Pt would continue to benefit from group-based therapy, though it was not available for this treatment as there are not enough participants at this time.      The self-generated functional sentences Pt have been updated since the last treatment period. See updated sentences below:  I love you   Get out of there!   It looks terrible in here.   You're a nice pameal.   Honey, can you button these?    Can you turn the volume down please?   What is that for?   I’ll take chow mein.   I'm okay with just water.   Honey, we gotta cut this off.  Baseline Data: Pt was independent for all sentences. He was able to produce " "10/10 sentences without modeling. The computer program only identified 2/10 sentences with an average peak of 61.0 dB. Further data gathering will be necessary upon the next session. Feedback was provided to speak with intent, be loud, and slow down when reading.  Current data: Pt was independent for all sentences. He was able to produce 10/10 sentences without modeling in 5/5 trials. Feedback and verbal cues were provided to speak with intent, be loud, and slow down when reading.  To increase conversational loudness, the sentences above should be practiced with a loud voice while speaking with intent. A home exercise program (HEP) was provided for Pt to practice sustained vowel phonation \"ah\", low to high pitch glide of \"ah\", and high to low pitch glide of \"ah\" while at home.  Status: Goal not met.    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 His diagnosis of mild hypokinetic dysarthria characterized by weak breathy voice, low volume, and variable speech rate. Pt underwent a Videofluoroscopic Swallow Study by this same SLP effective 12/29/2023. 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     Speech Therapy Plan  Pt would benefit from continued skilled ST therapy Visual reminders to increase his loudness will continue to be utilized to help him independently monitor his dB. Verbal and visual reminders throughout the activity will continue to be implemented. Encouragement to pursue tasks that bring self-fulfillment will persist. Pt has been referred for the addition of cognitive therapy. Therapy will resume for the [facility] summer term of 2024.     Prognosis & Recommendations  Impression Summary: Long term and short term goals will remain as written moving into the Summer 2024 semester. Please see " "below for current goals.   Prognosis:  Good    Goals  Short Term Goals:   STG 1: Pt will increase his average loudness in conversation to 75-80 dB in two consecutive sessions.   STG 2: Pt will maintain his average loudness at 80 dB during sustained phonation during simultaneous cognitive tasks.    STG 3: Pt will demonstrate carryover of taught strategies in a group session with 90% accuracy and minimal verbal cueing.       Short Term Goal Duration (Weeks):  2-4 months  Long Term Goals:  Pt will demonstrate carryover of an \"intentional voice\" from therapy to his home environment as reported by his wife across 3 consecutive sessions.  Long Term Goal Duration (Weeks):  4-6 months  Goal Comments: Pt would benefit from a \"refresher\" round of either LSVT or SPEAK OUT to improve his baseline motor speech.   Frequency:  2x week  Duration (in visits):  20      This is visit 27/40.       Referring provider co-signature:  I have reviewed this plan of care and my co-signature certifies the need for services.    Certification Period: 04/29/2024 to 08/13/24    Physician Signature: ________________________________ Date: ______________          "

## 2024-06-12 ENCOUNTER — SPEECH THERAPY (OUTPATIENT)
Dept: SPEECH THERAPY | Facility: OTHER | Age: 79
End: 2024-06-12
Payer: MEDICARE

## 2024-06-12 DIAGNOSIS — F02.80 PARKINSON'S DISEASE, LEWY BODY (HCC): ICD-10-CM

## 2024-06-12 DIAGNOSIS — G20.A1 HYPOKINETIC PARKINSONIAN DYSPHONIA (HCC): ICD-10-CM

## 2024-06-12 DIAGNOSIS — G31.83 PARKINSON'S DISEASE, LEWY BODY (HCC): ICD-10-CM

## 2024-06-12 DIAGNOSIS — R49.0 HYPOKINETIC PARKINSONIAN DYSPHONIA (HCC): ICD-10-CM

## 2024-06-12 PROCEDURE — 92507 TX SP LANG VOICE COMM INDIV: CPT | Mod: GN,GC | Performed by: SPEECH-LANGUAGE PATHOLOGIST

## 2024-06-12 NOTE — OP THERAPY DAILY TREATMENT
Outpatient Speech Therapy  DAILY TREATMENT     [facility] Speech Pathology & Audiology  [address] [place] [address]  Phone:  [phone]  Fax:  [phone]    Date: 06/12/2024    Patient: Augusto Montemayor Jr.  YOB: 1945  MRN: [id number]     Time Calculation    Start time: 1200  Stop time: 1300 Time Calculation (min): 60 minutes         Chief Complaint: Speech Therapy    Visit #: 28    Subjective Evaluation  Pt was alert and cooperative for the session. He was accompanied by his wife, Samantha, who remained in the waiting room throughout the entire session. Pt shuffled from the waiting room to the therapy room and was encouraged to “step with intent.” Pt reported concerns with short-term memory and had trouble finishing a thought at times during the session. He reported enjoying attending the Parkinson’s boxing club 2x a week [facility]. Pt and Samantha were debriefed after the session about the Speak Out! program pt will be doing this summer. They were shown how to watch the introduction video on the [employer] website and how to access the workbook online. Pt’s copy of the workbook has been ordered.      Speech Therapy Objective   1. A pre-treatment interview was conducted and audio-recorded. Pt explained when he noticed his first symptoms and when he was diagnosed with Parkinson’s disease. He detailed changes in his speech, such as sounding quieter. He explained that his esophagus was stretched for the second time (1-2 months ago) and that he noticed a difference in swallowing the first week post esophageal dilation before the sensation “tapered off.” Pt stated he has macular degeneration and the clinicians made a note to remind him to bring his glasses. Pt sustained /a/ for 19 seconds.     2. Pt. was instructed to read the “grandfather passage.” Short rushes of speech were noted. Pt lost track of his place a few times when reading and stated that “it gets  jumbled.”     3. Stimulability testing was conducted. Pt was instructed to sustain /a/ as long as he could at max loudness. Pt sustained /a/ for 17.5 seconds at 89 dB.      4. The clinicians instructed the pt to read short phrases using intent from the Speak Out! workbook and then had him repeat short functional phrases using intent. Pt demonstrated he was stimulable to speak louder after being prompted to use intent.     The 10 functional phrases are as follows:     1. I love you    2. Get out of there!    3. It looks terrible in here.    4. You're a nice pamela.    5. Honey, can you button these?    6.Can you turn the volume down please?    7. What is that for?    8. I’ll take chow mein.    9. I'm okay with just water.    10. Honey, we gotta cut this off.     5. Pt. watched his initial pre-treatment interview and stated he could not understand or hear himself speaking when compared to the interviewer. He stated that speaking with intent felt silly but that he could hear himself.      6. Clinicians guided pt through lesson 1 of the Speak Out! workbook, with maximal verbal prompting him to speak with intent to make his voice travel “over their heads.”     6. Clinicians ordered pt the Speak Out! workbook which will be delivered to his house. 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 His diagnosis of moderate hypokinetic dysarthria is characterized by a weak breathy voice, low volume, and variable speech rate.     Speech Therapy Plan  Pt will continue to benefit from skilled ST intervention at this time due to the degenerative nature of his diseases. Pt will complete lesson 2 of the Speak Out! workbook tomorrow (6.13.24) and will complete lesson 3 in therapy on Friday (6.14.24). Visual and verbal reminders to increase his loudness will continue to be implemented. Encouragement to  pursue tasks that bring self-fulfillment will persist and follow up on social activities.      This is visit 28/40.

## 2024-06-14 ENCOUNTER — SPEECH THERAPY (OUTPATIENT)
Dept: SPEECH THERAPY | Facility: OTHER | Age: 79
End: 2024-06-14
Payer: MEDICARE

## 2024-06-14 DIAGNOSIS — G20.A1 HYPOKINETIC PARKINSONIAN DYSPHONIA (HCC): ICD-10-CM

## 2024-06-14 DIAGNOSIS — G31.83 PARKINSON'S DISEASE, LEWY BODY (HCC): ICD-10-CM

## 2024-06-14 DIAGNOSIS — F02.80 PARKINSON'S DISEASE, LEWY BODY (HCC): ICD-10-CM

## 2024-06-14 DIAGNOSIS — R49.0 HYPOKINETIC PARKINSONIAN DYSPHONIA (HCC): ICD-10-CM

## 2024-06-14 PROCEDURE — 92507 TX SP LANG VOICE COMM INDIV: CPT | Mod: GN,GC | Performed by: SPEECH-LANGUAGE PATHOLOGIST

## 2024-06-15 NOTE — OP THERAPY DAILY TREATMENT
Outpatient Speech Therapy  DAILY TREATMENT     [facility] Speech Pathology & Audiology  [address] [place] [id number]  Phone:  [phone]  Fax:  [phone]    Date: 06/14/2024    Patient: Augusto Montemayor Jr.  YOB: 1945  MRN: [id number]     Time Calculation    Start time: 1200  Stop time: 1300 Time Calculation (min): 60 minutes         Chief Complaint: Speech Therapy    Visit #: 29    Subjective Evaluation  Pt arrived on time with his wife, Samantha, who remained in the waiting room throughout the entire session. Pt and Samantha stated that watching the Parkinson’s webinar was very helpful in explaining why pt will be practicing speaking with intent. Pt took larger steps when walking to the therapy room when encouraged to “step with intent.” He was cooperative throughout the session and enjoyed telling jokes following each exercise.      Speech Therapy Objective   1. Conversation with pt and wife: Pt and wife stated they were able to access the “Learn about Parkinson’s Webinar” on [employer] website. They were unable to locate the online SPEAK OUT! workbook. A physical copy of the workbook should arrive at [facility] in the next couple days. Pt stated he did not start Lesson 2 from the SPEAK OUT! workbook. Clinicians reminded pt to practice a lesson from the workbook 2x a day when he receives the workbook.     2. SPEAK OUT! workbook Lesson 2: maximal verbal and visual cueing was used to encourage pt to speak with intent in each of the 5 exercises. Pt increased loudness when provided the verbal prompt to “say it with intent” and “lengthen each word.” The clinicians performed the warmup exercises with the pt as he seemed nervous starting Lesson 2.     3. Pt was encouraged to try to access the online SPEAK OUT! workbook to practice the exercises over the weekend.     Assessments  Pt presents with moderate hypokinetic dysarthria secondary to his Parkinson’s disease and Lewy  body dementia diagnoses. His diagnosis of moderate hypokinetic dysarthria is characterized by a weak breathy voice, low volume, and variable speech rate.     Speech Therapy Plan  Pt will continue to benefit from skilled ST intervention at this time due to the degenerative nature of his diseases. Pt will repeat Lesson 2 of the Speak Out! workbook today (6.14.24), will complete Lesson 3 (2x) on Saturday (6.15.24) and Lesson 4 (2x) on Sunday (6.16.24). Lesson 5 will be planned for therapy on Monday (6.17.24). If pt has not accessed these lessons, Lesson 3 will be completed in therapy on Monday. Visual and verbal reminders to increase his loudness will continue to be implemented. Encouragement to pursue tasks that bring self-fulfillment will persist and follow up on social activities.        [x] As the licensed therapist supervising this student, I was present during the entire treatment session directing the care and reviewing the assessment plan.  I reviewed all documentation prior to signing.

## 2024-06-17 ENCOUNTER — SPEECH THERAPY (OUTPATIENT)
Dept: SPEECH THERAPY | Facility: OTHER | Age: 79
End: 2024-06-17
Payer: MEDICARE

## 2024-06-17 DIAGNOSIS — G31.83 PARKINSON'S DISEASE, LEWY BODY (HCC): ICD-10-CM

## 2024-06-17 DIAGNOSIS — F02.80 PARKINSON'S DISEASE, LEWY BODY (HCC): ICD-10-CM

## 2024-06-17 DIAGNOSIS — R49.0 HYPOKINETIC PARKINSONIAN DYSPHONIA (HCC): ICD-10-CM

## 2024-06-17 DIAGNOSIS — G20.A1 HYPOKINETIC PARKINSONIAN DYSPHONIA (HCC): ICD-10-CM

## 2024-06-18 NOTE — OP THERAPY PROGRESS SUMMARY
Outpatient Speech Therapy  PROGRESS SUMMARY NOTE      [facility] Speech Pathology & Audiology  [address] [facility] [address]  Phone:  [phone]  Fax:  [phone]    Date of Visit: 06/17/2024    Patient: Augusto Montemayor Jr.  YOB: 1945  MRN: [id number]     Referring Provider: MORGAN Garvey  [address]  [facility] [address]  [place] [address]   Referring Diagnosis No admission diagnoses are documented for this encounter.      Visit #: 30    Time Calculation    Start time: 1215  Stop time: 1300 Time Calculation (min): 45 minutes         Chief Complaint: Speech Therapy    Visit Diagnoses     ICD-10-CM   1. Parkinson's disease, Lewy body (HCC)  G31.83    F02.80   2. Hypokinetic Parkinsonian dysphonia (HCC)  G20.A1    R49.0       Subjective Evaluation  Pt arrived 10 minutes late and was unaccompanied in the waiting room. Pt seemed stiff walking to the therapy room. He stated he was sore from his boxing class. He spoke about spending time with his kids and grandchildren over the weekend and was frustrated by his memory issues as he “can’t remember all the kids.” He has not yet received his SPEAK OUT! booklet and was unable to locate the online SPEAK OUT! workbook. Pt was cooperative throughout the session.      Speech Therapy Objective  1.  SPEAK OUT! workbook Lesson 3: Max verbal and visual cueing was used to encourage pt to speak with intent in each of the 5 exercises. Clinician noted that Pt independently began some exercises with higher intensity before tapering off to a voice of lower intensity. Pt increased loudness when provided the verbal prompt to “say it intentionally.” Clinician recorded pt’s dB level for each sustained /a/ performed during the warmup exercises. Max verbal prompting was given.    /a/ 76 dB   2. /a/ 83 dB   3. /a/ 81 dB   4. /a/ 83 dB   5. /a/ 79 dB   6. /a/ 85 dB   Average dB for 6/6 trials: 81.2 dB.  Typical MPT for adults is 10-15 seconds with a dB  "range of 75-80 dB.     2. Progress: Refer to most recent progress note on 4/29/24 for most current progress data on vocal intensity and cognition goals. The SPEAK OUT! program was recently initiated, and more data needs to be collected.      The SPEAK OUT! program was initiated on 6/12/24 to provide a structured program that helps pts regain and retain their speech and communication while minimizing swallowing issues. SPEAK OUT! is based on principles of motor learning and teachings of J Luis Grover, PhD, CCC-SLP, and helps pts with Parkinson’s disease learn to convert speech from an automatic function to an intentional act. Currently, 3 Lessons have been completed from the SPEAK OUT! Workbook.      Sustained Phonation   Data from 4/29/24: Sustained phonation of ‘ah’, without simultaneous cognitive tasks, for 5/5 trials was measured with an average intensity of 76.5 dB   Current Data: Sustained phonation of ‘ah’, without simultaneous cognitive tasks, for 6/6 trials was measured with an average intensity of 81.2 dB. Pt has demonstrated an improvement of +4.7 dB compared to 4/29/24.      Cognition   Pt is still expressing some cognitive concerns. Cognition exercises are being introduced as a part of the SPEAK OUT! program lessons.     3. GOALS      NEW STGs:   STG1: Patient will independently demonstrate the six SPEAK OUT! exercises with minimum visual and verbal cueing across three consecutive sessions.  STG2: Patient/family will establish a daily home practice routine of completing a SPEAK OUT! lesson 2x/day across seven consecutive days.  STG3: Patient will demonstrate how to \"speak with intent” by modeling the difference between speaking with and without intent in 3 out of 4 trials.   STG4: Patient will successfully transition to a SPEAK OUT! Therapy Group upon completion of the individual SPEAK OUT! program as measured by demonstrating taught strategies with 90% accuracy and minimal verbal and visual " "cueing.  Short Term Goal Duration (Weeks):  2-4 months     Long Term Goals:  Pt will demonstrate carryover of an \"intentional voice\" from therapy to his home environment as reported by his wife across 3 consecutive sessions.   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 His diagnosis of moderate hypokinetic dysarthria is characterized by a weak breathy voice, low volume, and variable speech rate.     Speech Therapy Plan :   Prognosis & Recommendations  Impression Summary:  Please see below for current goals  Prognosis:  Good  Goals  Short Term Goals:  ·    STG1: Patient will independently demonstrate the six SPEAK OUT! exercises with minimum visual and verbal cueing across three consecutive sessions.    STG2: Patient/family will establish a daily home practice routine of completing a SPEAK OUT! lesson 2x/day across seven consecutive days.    STG3: Patient will demonstrate how to \"speak with intent\" by modeling the difference between speaking with and without intent in 3 out of 4 trials.     STG4: Patient will successfully transition to a SPEAK OUT! Therapy Group upon completion of the individual SPEAK OUT! program as measured by demonstrating taught strategies with 90% accuracy and minimal verbal and visual cueing.  Short Term Goal Duration (Weeks):  2-4 months  Long Term Goals:    Long Term Goals:  Pt will demonstrate carryover of an \"intentional voice\" from therapy to his home environment as reported by his wife across 3 consecutive sessions.       Long Term Goal Duration (Weeks):  4-6 months  Planned Therapy Interventions:  Voice training,   Frequency:  3x week    Pt will continue to benefit from skilled ST intervention at this time due to the degenerative nature of his diseases. Should pt receive his workbook, pt will repeat Lesson 3 of the SPEAK OUT! workbook today (6.17.24), " will complete Lessons 4, 5, 6, 7 (1 lesson/day, 2x/day) for the remainder of the week, Tuesday - Friday. Due to scheduling conflicts, pt will resume in-person therapy on Monday, 6/24/24, with Lesson 8 planned for that session. If pt has not accessed Lessons 4-7, Lesson 4 will be completed in therapy on Monday, 6/24/24. Visual and verbal reminders to increase pt vocal intensity will continue to be implemented. Encouragement to pursue tasks that bring self-fulfillment will persist and follow up on social activities.      This is visit 30/40     Functional Assessment Used       Referring provider co-signature:  I have reviewed this plan of care and my co-signature certifies the need for services.    Certification Period: 06/17/2024 to 09/23/24    Physician Signature: ________________________________ Date: ______________

## 2024-06-24 ENCOUNTER — SPEECH THERAPY (OUTPATIENT)
Dept: SPEECH THERAPY | Facility: OTHER | Age: 79
End: 2024-06-24
Payer: MEDICARE

## 2024-06-24 DIAGNOSIS — G20.A1 HYPOKINETIC PARKINSONIAN DYSPHONIA (HCC): ICD-10-CM

## 2024-06-24 DIAGNOSIS — R49.0 HYPOKINETIC PARKINSONIAN DYSPHONIA (HCC): ICD-10-CM

## 2024-06-24 DIAGNOSIS — F02.80 PARKINSON'S DISEASE, LEWY BODY (HCC): ICD-10-CM

## 2024-06-24 DIAGNOSIS — G31.83 PARKINSON'S DISEASE, LEWY BODY (HCC): ICD-10-CM

## 2024-06-24 NOTE — OP THERAPY DAILY TREATMENT
"  Outpatient Speech Therapy  DAILY TREATMENT     [facility] Speech Pathology & Audiology  [address] [place] [address]  Phone:  [phone]  Fax:  [phone]    Date: 06/24/2024    Patient: Augusto Montemayor Jr.  YOB: 1945  MRN: [id number]     Time Calculation    Start time: 1200  Stop time: 1300 Time Calculation (min): 60 minutes         Chief Complaint: Speech Therapy    Visit #: 31    Subjective Evaluation  Pt arrived on time. Pt seemed stiff and stated his back was sore from yardwork. He did not have his hearing aids today and had trouble understanding the clinicians at times. Pt received his SPEAK OUT! booklet on Saturday and repeated lessons 1 and 2 over the weekend. The clinicians decided to continue with lesson 3 during today’s session. Pt was cooperative throughout the session and seemed to focus more on using intent when speaking.      Speech Therapy Objective   1. SPEAK OUT! workbook Lesson 3:    Exercises Demonstrates [place]ger   Warm-up No - moderate visual and verbal cueing     Ah Yes - min visual and verbal cueing     Glide Yes - min visual and verbal cueing   Counting No - moderate visual and verbal cueing     Reading No - maximum visual and verbal cueing     Conversation No - maximum visual and verbal cueing       Verbal and visual cueing were used to encourage pt to speak with intent in each of the 6 exercises. Cueing stimulability testing was performed to determined the most effective cue for pt. Pt increased loudness and clarity of voice when provided the verbal prompt to “throw your voice at the wall” and the visual cue of the clinician pretending to throw something over her shoulder toward the wall behind her. It is helpful if the clinicians perform the first warmup exercise “May-Me-My-Ismael-Moo\" with the pt to ease him into the therapy session.     Speaking with intent:  X 0% ? 25%  ? 50% ? 75% ? 100%  (No cues - Spontaneous conversation)            Pt " uses intent occasionally in spontaneous conversation on one to a few words.    Best cue for intent: “Throw your voice at the wall!”   It was noted that pt used more intent with partner tasks that required back and forth participation.      2. Pt was shown how to access the online daily SPEAK OUT! practice videos offered through [employer] and encouraged to practice live or with prerecorded videos on his days when he does not attend therapy.     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 His diagnosis of moderate hypokinetic dysarthria is characterized by a weak breathy voice, low volume, and variable speech rate.     Speech Therapy Plan  Pt will continue to benefit from skilled ST intervention at this time due to the degenerative nature of his diseases. Pt will repeat Lesson 3 of the Speak Out! workbook today (6.24.24), will complete Lesson 4 (2x) on Tuesday (6.25.24). Lesson 5 will be completed in therapy on Wednesday. Visual and verbal reminders, such as “throw your voice at the wall” and gesturing throwing the voice over the clinician’s shoulder, will continue to be implemented to increase his loudness and clarity of speech. Encouragement to pursue tasks that bring self-fulfillment will persist and follow up on social activities (e.g. boxing).      This is visit 31/40

## 2024-06-26 ENCOUNTER — SPEECH THERAPY (OUTPATIENT)
Dept: SPEECH THERAPY | Facility: OTHER | Age: 79
End: 2024-06-26
Payer: MEDICARE

## 2024-06-26 DIAGNOSIS — R49.0 HYPOKINETIC PARKINSONIAN DYSPHONIA (HCC): ICD-10-CM

## 2024-06-26 DIAGNOSIS — G20.A1 HYPOKINETIC PARKINSONIAN DYSPHONIA (HCC): ICD-10-CM

## 2024-06-26 DIAGNOSIS — F02.80 PARKINSON'S DISEASE, LEWY BODY (HCC): ICD-10-CM

## 2024-06-26 DIAGNOSIS — G31.83 PARKINSON'S DISEASE, LEWY BODY (HCC): ICD-10-CM

## 2024-06-26 NOTE — OP THERAPY DAILY TREATMENT
"  Outpatient Speech Therapy  DAILY TREATMENT     [facility] Speech Pathology & Audiology  [address] [facility] [address]  Phone:  [phone]  Fax:  [phone]    Date: 06/26/2024    Patient: Augusto Montemayor Jr.  YOB: 1945  MRN: [id number]     Time Calculation    Start time: 1200  Stop time: 1300 Time Calculation (min): 60 minutes         Chief Complaint: Speech Therapy    Visit #: 32    Subjective Evaluation  Pt arrived on time and was accompanied by his wife, Samantha, who remained in the wainting room throughout the session. Pt did not seem as stiff as last session and appeared to walk more fluidly to the treatment room. Pt did not complete Lesson 4 on Tuesday so Lesson 4 was completed in therapy today. The clinicians reminded pt and Samantha of the expectations for home practice when following the SPEAK OUT! program. Pt was cooperative throughout the session.     Speech Therapy Objective   1. SPEAK OUT! workbook Lesson 4:    Exercises Demonstrates [place]   Warm-up No - moderate visual and verbal cueing     Ah No - max visual and verbal cueing   Glide Yes - min visual and verbal cueing   Counting Yes - moderate visual and verbal cueing     Reading Yes - min visual and verbal cueing     Conversation No - mod visual and verbal cueing     Verbal and visual cueing was used to encourage pt to speak with intent in each of the 6 exercises. Pt seems to prefer the verbal prompt to “throw your voice at the wall” and the visual cue of the clinician pretending to throw something over her shoulder toward the wall behind her to increase his loudness and clarity of voice. It is helpful if the clinicians perform the first warmup exercise “May-Me-My-Ismael-Moo\" with the pt to ease him into the therapy session.     Pt noticed when his voice dropped in intensity or when specific words were more difficult to “push out.” This shows he is starting to develop awareness of his voice quality and words " may require more effort to speak with intent.      The client seems to enjoy discussing country songs. The clinician printed out the “Faster Horses (The Cowboy and the Poet)” song by Rashid Burnham to be used in replacement of the SPEAK OUT! Conversation exercise. The clinician and pt took turns reading cb lines with intent. This simulated conversational turn-taking and was a salient activity for the pt. Song lyrics will continue to be included as a therapeutic activity to increase pt motivation.     Speaking with intent  ? 0%  X 25%  ? 50% ? 75% ? 100%  (No cues - Spontaneous conversation)              Pt seemed to speak with intent during the first few words of a sentence before his voice dropped off in intensity for the remainder of the sentence.     Best cue for intent: “Throw your voice at the wall!”     Pt enjoys partner tasks that require back-and-forth participation, however, he struggles with using intent if he is unfamiliar with the task.     2. Pt’s workbook was marked with sticky notes indicating the lessons he needs to complete before the next therapy session.    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 His diagnosis of moderate hypokinetic dysarthria is characterized by a weak breathy voice, low volume, and variable speech rate.     Speech Therapy Plan  Pt will continue to benefit from skilled ST intervention at this time due to the degenerative nature of his diseases. Pt will repeat Lesson 4 of the Speak Out! workbook today (6.26.24), will complete Lesson 5 (2x) on Thursday (6.27.24). Lesson 6 will be completed in therapy on Wednesday. Visual and verbal reminders, such as “throw your voice at the wall” and gesturing throwing the voice over the clinician’s shoulder, will continue to be implemented to increase his loudness and clarity of speech. Encouragement to pursue tasks that bring self-fulfillment will  persist and follow up on social activities (e.g. boxing).      This is visit 32/40

## 2024-06-28 ENCOUNTER — SPEECH THERAPY (OUTPATIENT)
Dept: SPEECH THERAPY | Facility: OTHER | Age: 79
End: 2024-06-28
Payer: MEDICARE

## 2024-06-28 DIAGNOSIS — G20.A1 HYPOKINETIC PARKINSONIAN DYSPHONIA (HCC): ICD-10-CM

## 2024-06-28 DIAGNOSIS — F02.80 PARKINSON'S DISEASE, LEWY BODY (HCC): ICD-10-CM

## 2024-06-28 DIAGNOSIS — G31.83 PARKINSON'S DISEASE, LEWY BODY (HCC): ICD-10-CM

## 2024-06-28 DIAGNOSIS — R49.0 HYPOKINETIC PARKINSONIAN DYSPHONIA (HCC): ICD-10-CM

## 2024-06-28 PROCEDURE — 92507 TX SP LANG VOICE COMM INDIV: CPT | Mod: GN,GC | Performed by: SPEECH-LANGUAGE PATHOLOGIST

## 2024-06-28 NOTE — OP THERAPY DAILY TREATMENT
"  Outpatient Speech Therapy  DAILY TREATMENT     [facility] [place] [facility] Speech Pathology & Audiology  [address] [address]  Phone:  [phone]  Fax:  [phone]    Date: 06/28/2024    Patient: Augusto Montemayor Jr.  YOB: 1945  MRN: [id number]     Time Calculation    Start time: 1200  Stop time: 1300 Time Calculation (min): 60 minutes         Chief Complaint: Speech Therapy    Visit #: 33    Subjective Evaluation  Pt was accompanied by his wife, Samantha, who remained in the waiting room throughout the session. They completed the SPEAK OUT! home practice as instructed. Pt seemed ready for the session as he got up right away when the clinicians entered the waiting room. He demonstrated some confusion during the conversation exercise but was cooperative and focused throughout the session.     Speech Therapy Objective  1. Speaking with intent  ? 0%  X 25%  ? 50% ? 75% ? 100%  (No cues - Spontaneous conversation)              Pt’s voice was recorded at the beginning of the session.     Pt spoke 32 words with intent out of 109 total words, totaling 29.4% of words spoken with intent.     Pt increased intensity after clearing his throat 1x.     2. SPEAK OUT! workbook Lesson 6:    Exercises Demonstrates [place]   Warm-up No - moderate visual and verbal cueing     Ah No - max visual and verbal cueing   Glide Yes - min visual and verbal cueing   Counting Yes - moderate visual and verbal cueing *for the \"teen\" numbers   Reading Yes - no cues given   Conversation Yes - min visual and verbal cueing       Verbal and visual cueing was used to encourage pt to speak with intent in each of the 6 exercises. Pt prefers the verbal prompt to “throw your voice at the wall” and the visual cue of the clinician pretending to throw something over her shoulder toward the wall behind her to increase his loudness and clarity of voice. Clinician continued to perform the first warmup exercise “May-Me-My-Ismael-Moo\" " "with the pt to ease him into the therapy session.     Pt noticed that the visual cue of the clinician pretending to throw something over her shoulder helped him to increase vocal intensity. He also stated that opening his mouth wider during the “ah” exercise helped him to project his voice. As stated in the previous note, pt is starting to develop awareness of his vocal quality and intensity as he is speaking.     The clinician printed out the “Faster Horses (The Cowboy and the Poet)” song by Rashid Burnham to be used in addition to the SPEAK OUT! Conversation exercise. The clinician and pt took turns reading cb lines with intent. This simulated conversational turn-taking and was a salient activity for the pt. A different song will be introduced next session.     Best cue for intent: “Throw your voice at the wall!”     Pt enjoys partner tasks that require back-and-forth participation.     3. Pts voice was recorded in the beginning of the session and during the SPEAK OUT! reading exercise. Pt listened and compared his vocal intensity for each recording. He stated that his voice was much clearer during the reading exercise and he “couldn’t hear himself” during the first recording. He noticed when he used a few words with intent during the first recording and stated that he \"started using intent” with those words.      4. Pt’s workbook was marked with sticky notes indicating the lessons he needs to complete before the next therapy session.     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 His diagnosis of moderate hypokinetic dysarthria is characterized by a weak breathy voice, low volume, and variable speech rate.     Speech Therapy Plan  Pt will continue to benefit from skilled ST intervention at this time due to the degenerative nature of his diseases. Pt will repeat Lesson  6 of the Speak Out! workbook today (6.28.24), Lesson 7 " (2x) on Saturday (6.29.24), and will complete Lesson 8 on Sunday (6.30.24). Lesson 9 will be completed in therapy on Monday (7.01.24). Visual and verbal reminders, such as “throw your voice at the wall” and gesturing throwing the voice over the clinician’s shoulder, will continue to be implemented to increase his loudness and clarity of speech. Encouragement to pursue tasks that bring self-fulfillment will persist and follow up on social activities (e.g. boxing).      This is visit 33/40

## 2024-07-01 ENCOUNTER — APPOINTMENT (OUTPATIENT)
Dept: SPEECH THERAPY | Facility: OTHER | Age: 79
End: 2024-07-01
Payer: MEDICARE

## 2024-07-03 ENCOUNTER — APPOINTMENT (OUTPATIENT)
Dept: SPEECH THERAPY | Facility: OTHER | Age: 79
End: 2024-07-03
Payer: MEDICARE

## 2024-07-03 DIAGNOSIS — F02.80 PARKINSON'S DISEASE, LEWY BODY (HCC): ICD-10-CM

## 2024-07-03 DIAGNOSIS — R49.0 HYPOKINETIC PARKINSONIAN DYSPHONIA (HCC): ICD-10-CM

## 2024-07-03 DIAGNOSIS — G31.83 PARKINSON'S DISEASE, LEWY BODY (HCC): ICD-10-CM

## 2024-07-03 DIAGNOSIS — G20.A1 HYPOKINETIC PARKINSONIAN DYSPHONIA (HCC): ICD-10-CM

## 2024-07-03 PROCEDURE — 92507 TX SP LANG VOICE COMM INDIV: CPT | Mod: GN,GC | Performed by: SPEECH-LANGUAGE PATHOLOGIST

## 2024-07-05 ENCOUNTER — APPOINTMENT (OUTPATIENT)
Dept: SPEECH THERAPY | Facility: OTHER | Age: 79
End: 2024-07-05
Payer: MEDICARE

## 2024-07-05 DIAGNOSIS — G31.83 PARKINSON'S DISEASE, LEWY BODY (HCC): ICD-10-CM

## 2024-07-05 DIAGNOSIS — G20.A1 HYPOKINETIC PARKINSONIAN DYSPHONIA (HCC): ICD-10-CM

## 2024-07-05 DIAGNOSIS — F02.80 PARKINSON'S DISEASE, LEWY BODY (HCC): ICD-10-CM

## 2024-07-05 DIAGNOSIS — R49.0 HYPOKINETIC PARKINSONIAN DYSPHONIA (HCC): ICD-10-CM

## 2024-07-05 PROCEDURE — 92507 TX SP LANG VOICE COMM INDIV: CPT | Mod: GN,GC,KX | Performed by: SPEECH-LANGUAGE PATHOLOGIST

## 2024-07-06 NOTE — OP THERAPY DAILY TREATMENT
Outpatient Speech Therapy  DAILY TREATMENT     [facility] Speech Pathology & Audiology  [address] [place] [address]  Phone:  [phone]  Fax:  [phone]    Date: 07/05/2024    Patient: Augusto Montemayor Jr.  YOB: 1945  MRN: [id number]     Time Calculation    Start time: 1200  Stop time: 1300 Time Calculation (min): 60 minutes         Chief Complaint: Speech Therapy    Visit #: 35    Subjective Evaluation  Pt was accompanied by his wife, Samantha, who remained in the waiting room throughout the session. He and Samantha completed the SPEAK OUT! home practice over the holiday (4th of July) as instructed. Pt explained that he noticed a difference in conversations with friends when he was speaking with intent and not using intent. At the beginning of the session, he started using intent when speaking before using a quieter voice for the remainder of the conversation. At the end of the session, it was noted that pt used intent when initiating a conversation with another  in the hallway. Pt seemed in a good mood focused throughout the session.     Speech Therapy Objective   1. Speaking with intent ? 0%  X 25%  ? 50% ? 75% ? 100%  (No cues - Spontaneous conversation)            Pt’s voice was recorded at the beginning of the session.   Pt spoke 56 words with intent out of 123 total words, totaling 45.5% of words spoken with intent (just short of 50%). There is notable improvement of pt speaking with intent in spontaneous conversation.     2. SPEAK OUT! workbook Lesson 13:   Exercises Demonstrates [place]   Warm-up Yes - no cues given     Ah Yes - min visual and verbal cueing   Glide Yes - min visual and verbal cueing   Counting Yes - min visual and verbal cueing   Reading Yes - mod visual and verbal cueing (sentences were long and complex)   Conversation No - mod visual and verbal cueing     Verbal and visual cueing was used to encourage pt to speak with intent in  each of the 6 exercises. Pt initiated and performed the first few exercises with little to no verbal prompting or cueing. He had difficulty with the reading exercise again due to an increase in linguistic complexity. Pt required moderate visual and verbal prompting on the conversation exercise and needed to be guided through the exercise by the clinician. Clinician printed out the “[place]” song sung by Ronni Graham, Jeremiah Méndez, Chacho Kilpatrick, and Tim Escalera to be used in addition to the SPEAK OUT! conversation exercise. The clinicians and pt took turns reading cb paragraphs with intent. This simulated conversational turn-taking and pt seemed to thoroughly enjoy the activity.      Best cue for intent: “Throw your voice at the wall!”     Pt seems to enjoy partner tasks that require back-and-forth participation and simulated conversations.     3. Pt’s workbook was marked with sticky notes indicating the lessons he needs to complete before the next therapy session.     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 His diagnosis of moderate hypokinetic dysarthria is characterized by a weak breathy voice, low volume, and variable speech rate.     Speech Therapy Plan  Pt will continue to benefit from skilled ST intervention at this time due to the degenerative nature of his diseases. Pt will repeat Lesson 13 of the Speak Out! workbook today (7.05.24) and Lesson 14 (2x) on Saturday (7.06.24), and Lesson 15 on Sunday (7.07.24). Lesson 16 will be completed in therapy on Monday (7.08.24). Visual and verbal reminders, such as “throw your voice at the wall” and gesturing throwing the voice over the clinician’s shoulder, will continue to be implemented as needed to increase his loudness and clarity of speech. Clinician will start to simulate different conversational environments, such as taking a walk around the clinic and  conversing about a topic while using intent. Clinician will continue to follow up on social activities (e.g. boxing and art).      This is visit 35/40

## 2024-07-08 ENCOUNTER — SPEECH THERAPY (OUTPATIENT)
Dept: SPEECH THERAPY | Facility: OTHER | Age: 79
End: 2024-07-08
Payer: MEDICARE

## 2024-07-08 DIAGNOSIS — G31.83 PARKINSON'S DISEASE, LEWY BODY (HCC): ICD-10-CM

## 2024-07-08 DIAGNOSIS — G20.A1 HYPOKINETIC PARKINSONIAN DYSPHONIA (HCC): ICD-10-CM

## 2024-07-08 DIAGNOSIS — R49.0 HYPOKINETIC PARKINSONIAN DYSPHONIA (HCC): ICD-10-CM

## 2024-07-08 DIAGNOSIS — F02.80 PARKINSON'S DISEASE, LEWY BODY (HCC): ICD-10-CM

## 2024-07-08 PROCEDURE — 92507 TX SP LANG VOICE COMM INDIV: CPT | Mod: GN,GC | Performed by: SPEECH-LANGUAGE PATHOLOGIST

## 2024-07-22 ENCOUNTER — SPEECH THERAPY (OUTPATIENT)
Dept: SPEECH THERAPY | Facility: OTHER | Age: 79
End: 2024-07-22
Payer: MEDICARE

## 2024-07-22 DIAGNOSIS — G31.83 PARKINSON'S DISEASE, LEWY BODY (HCC): ICD-10-CM

## 2024-07-22 DIAGNOSIS — R49.0 HYPOKINETIC PARKINSONIAN DYSPHONIA (HCC): ICD-10-CM

## 2024-07-22 DIAGNOSIS — F02.80 PARKINSON'S DISEASE, LEWY BODY (HCC): ICD-10-CM

## 2024-07-22 DIAGNOSIS — G20.A1 HYPOKINETIC PARKINSONIAN DYSPHONIA (HCC): ICD-10-CM

## 2024-07-22 PROCEDURE — 92507 TX SP LANG VOICE COMM INDIV: CPT | Mod: GN,GC,KX | Performed by: SPEECH-LANGUAGE PATHOLOGIST

## 2024-07-23 NOTE — OP THERAPY DAILY TREATMENT
Outpatient Speech Therapy  DAILY TREATMENT     [facility] Speech Pathology & Audiology  [address] [place] [address]  Phone:  [phone]  Fax:  [phone]    Date: 07/22/2024    Patient: Augusto Montemayor Jr.  YOB: 1945  MRN: [id number]     Time Calculation    Start time: 1200  Stop time: 1300 Time Calculation (min): 60 minutes         Chief Complaint: Speech Therapy    Visit #: 37    Subjective Evaluation  Pt was accompanied by his wife, Samantha, who remained in the waiting room throughout the session. Pt has not been in since July 8th. He stated he has been completing about one SPEAK OUT! home practice lesson per day.  Pt had family in town the previous week and complained of a “dry throat.” He said he had to use intent to speak to family members especially when there were other conversations taking place around him. Pt seems aware that using intent helps him to speak clearly and be understood by others. He appeared excited to return to therapy but a little self-conscious in performing some of the exercises.      Speech Therapy Objective   1. Speaking with intent ? 0%  X 25%  ? 50% ? 75% ? 100%  (No cues - Spontaneous conversation)   Pt’s voice was recorded at the beginning of the session.   Pt spoke 56 words with intent out of 134 total words, totaling 41.8% of words spoken with intent. This is an improvement from the last session where 29% of words in spontaneous conversation were spoken with intent.    2. SPEAK OUT! workbook Lesson 17:   Exercises Demonstrates [place]   Warm-up Yes - min cues given   Ah No - mod visual and verbal cueing (pt had to have exercise explained multiple times)   Glide Yes - mod visual and verbal cueing   Counting Yes - min visual and verbal cueing (pt had to be redirected)   Reading No - max visual and verbal cues given. Exercise was discontinued due to difficulty   Conversation No - mod visual and verbal cueing     Verbal and visual cueing  were used to encourage pt to speak with intent in each of the 6 exercises. Clinician had to provide directions multiple times for the sustained “Ah” exercise. Pt performed the counting exercise clearly and independently. Clinician challenged pt to count down the columns on the 3rd repetition. Pt stated he liked the challenge and though it was more difficult to get enough breath support for speech. Pt struggled with the reading exercise as the print was small and the sentences were lengthy and complex. This exercise was discontinued due to pt focusing on reading accuracy rather than speaking intentionally. Pt did not fully complete the conversation exercise but did describe two movies, using intent, provided moderate visual and verbal cueing from the clinician. Clinician noted some regression in pt’s independence completing Speak Out! exercises. This is most likely due to the pause in therapy and it is recommended the pt continue attending therapy 3x/week at this time.        Best cue for intent: “Throw your voice at the wall!” and visual of a target (bullseye) to “throw” his voice at.   Pt enjoys partner tasks that require back-and-forth participation and simulated conversations.     3. Pt’s workbook was marked with sticky notes indicating the lessons he needs to complete before the next therapy session. He is encouraged to read aloud to his grandchildren/great-grandchildren as an alternative to the Speak Out! reading exercise.     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 His diagnosis of moderate hypokinetic dysarthria is characterized by a weak breathy voice, low volume, and variable speech rate.     Speech Therapy Plan  Pt will continue to benefit from skilled ST intervention at this time due to the degenerative nature of his diseases. Pt will repeat Lesson 17 of the Speak Out! workbook today (7.22.24) and Lesson 18 (2x)  on Tuesday (7.23.24). Lesson 19 will be completed in therapy on Friday (7.25.24). Visual and verbal reminders, such as “throw your voice at the wall” and gesturing throwing the voice over the clinician’s shoulder, will continue to be implemented as needed to increase his loudness and clarity of speech. Clinician will simulate different conversational environments, such as taking a walk around the clinic and conversing about a topic while using intent. Clinician will continue to follow up on social activities (e.g. boxing and family events).       This is visit 37/40

## 2024-07-24 ENCOUNTER — APPOINTMENT (OUTPATIENT)
Dept: SPEECH THERAPY | Facility: OTHER | Age: 79
End: 2024-07-24
Payer: MEDICARE

## 2024-07-29 ENCOUNTER — SPEECH THERAPY (OUTPATIENT)
Dept: SPEECH THERAPY | Facility: OTHER | Age: 79
End: 2024-07-29
Payer: MEDICARE

## 2024-07-29 DIAGNOSIS — G20.A1 HYPOKINETIC PARKINSONIAN DYSPHONIA (HCC): ICD-10-CM

## 2024-07-29 DIAGNOSIS — F02.80 PARKINSON'S DISEASE, LEWY BODY (HCC): ICD-10-CM

## 2024-07-29 DIAGNOSIS — G31.83 PARKINSON'S DISEASE, LEWY BODY (HCC): ICD-10-CM

## 2024-07-29 DIAGNOSIS — R49.0 HYPOKINETIC PARKINSONIAN DYSPHONIA (HCC): ICD-10-CM

## 2024-07-29 PROCEDURE — 92507 TX SP LANG VOICE COMM INDIV: CPT | Mod: GN,GC,KX | Performed by: SPEECH-LANGUAGE PATHOLOGIST

## 2024-07-30 NOTE — OP THERAPY PROGRESS SUMMARY
Outpatient Speech Therapy  PROGRESS SUMMARY NOTE      [facility] Speech Pathology & Audiology  [address] [address]  Phone:  [phone]  Fax:  [phone]    Date of Visit: 07/29/2024    Patient: Augusto Montemayor Jr.  YOB: 1945  MRN: [id number]     Referring Provider: MORGAN Garvey  [address]  [facility] [address]  [place] [address]   Referring Diagnosis No admission diagnoses are documented for this encounter.      Visit #: 38    Progress Report Period: 6/17/24 - 7/29/24    Time Calculation    Start time: 1400  Stop time: 1500 Time Calculation (min): 60 minutes         Chief Complaint: Speech Therapy    Visit Diagnoses     ICD-10-CM   1. Parkinson's disease, Lewy body (HCC)  G31.83    F02.80   2. Hypokinetic Parkinsonian dysphonia (HCC)  G20.A1    R49.0       Subjective Evaluation  Pt was accompanied by his wife, Samantha, who remained in the waiting room throughout the session. Pt has not been in since July 22nd. He stated he has been completing 2-3 SPEAK OUT! home practice lessons per day but the reading component of the lessons has been especially challenging. Pt said letters such as M’s and W’s keep “getting flipped” and his “macular degeneration has gotten worse.” Clinician made the recommendation to leave out the reading component of the SPEAK OUT! lessons as the pt is not focusing on the objective of using intent. Pt stated he was sore and stiff from his PT appointment, but he worked diligently throughout the speech therapy session.      Speech Therapy Objective  1. Speaking with intent ? 0%  X 25%  ? 50% ? 75% ? 100%  (No cues - Spontaneous conversation)   Pt’s voice was recorded at the beginning of the session and at the end of the session.   Pre-session: Pt spoke 41 words with intent out of 120 total words, totaling 34% of words spoken with intent.    Post-session (with cues): Pt spoke 93 words with intent out of 126 total words, totaling 74% of words  spoken with intent. This increase of 40% demonstrates that cues are helpful to remind pt to focus on using intent in conversation.   Overall, pt has increased his words spoken with intent in spontaneous conversation from 0% to 25% since 6/24/24.     Conversational intensity level   Pre-session: Decibel/intensity level pre-therapy session was recorded at 45 dB.   Post-session: Decibel/intensity level post-therapy session was recorded at 52dB. This was an increase of 7 dB.      2. SPEAK OUT! workbook Lesson 19:   Exercises Demonstrates [place]   Warm-up Yes - no cues given   Ah Yes - min visual and verbal cueing   Glide Yes - min visual and verbal cueing   Counting Yes - mod visual and verbal cueing (pt had to be redirected)   Reading Activity was discontinued because vision challenges were distracting the patient from the goal of speaking with intent   Conversation No - mod visual and verbal cueing     Verbal and visual cueing were used to encourage pt to speak with intent in each of the 6 exercises. Pt independently completed the Warm-up and Ah exercises with none to minimal visual and verbal cueing. The clinician had to guide the patient multiple times on how to complete the counting exercise as he got lost reading the numbers when trying to read down the columns instead of across the rows. The reading exercise was discontinued due to vision challenges distracting the patient from the goal of speaking with intent. Problem-solving cards from the Parkinson Voice Project website were used in place of the conversation exercise this session. Pt enjoyed this part of the session, but clinician had to increase cueing. Though external circumstances have caused a lapse in the regularity of therapy sessions, pt demonstrated independence in completing the majority of Speak Out! exercises.      Sustained “Ah”:   1. /a/ 87 dB   2. /a/ 83 dB   3. /a/ 82 dB   Typical MPT for adults is 10-15 seconds with a dB range of 75-80  "dB.     Sustained Phonation of \"Ah\"  Data from 6/17/24: Sustained phonation of ‘ah’, without simultaneous cognitive tasks, for 6/6 trials was measured with an average intensity of 81.2 dB   Current Data: Sustained phonation of ‘ah,’ without simultaneous cognitive tasks, for 3/3 trials was measured with an average intensity of 84 dB. Pt has demonstrated an improvement of +2.8 dB compared to 6/17/24.     Best cue for intent: “Throw your voice at the wall!,” “use intent/more intent,” and a visual of a target (bullseye) to “throw” his voice at.   Pt enjoys partner tasks that require back-and-forth participation and simulated conversations.     3. Pt was recommended to complete all the lessons in the SPEAK OUT! workbook on his own and to practice daily until therapy resumes in the Fall. He was encouraged to read aloud to his grandchildren/great-grandchildren as an alternative to the SPEAK OUT! reading exercise.     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 His diagnosis of moderate hypokinetic dysarthria is characterized by a weak breathy voice, low volume, and variable speech rate.     Speech Therapy Plan :   Goals  Short Term Goals:  STG1: Patient will independently demonstrate the six SPEAK OUT! exercises with minimum visual and verbal cueing across three consecutive sessions.  STG2: Patient/family will establish a daily home practice routine of completing a SPEAK OUT! lesson 2x/day across seven consecutive days.  STG3: Patient will demonstrate how to \"speak with intent\" by modeling the difference between speaking with and without intent in 3 out of 4 trials.   STG4: Patient will successfully transition to a SPEAK OUT! Therapy Group upon completion of the individual SPEAK OUT! program as measured by demonstrating taught strategies with 90% accuracy and minimal verbal and visual cueing.  Short Term Goal Duration (Weeks):  2-4 " "months  Patient progression on Short Term Goals:  STG2: Patient/family will establish a daily home practice routine of completing a SPEAK OUT! lesson 2x/day across seven consecutive days.  (MET) - pt is completing 2-3 lessons daily for home practice  Long Term Goals:  Long Term Goal:  Pt will demonstrate carryover of an \"intentional voice\" from therapy to his home environment as reported by his wife across 3 consecutive sessions.  Long Term Goal Duration (Weeks):  4-6 months    Pt demonstrated an increase in the percentage of words spoken with intent and an increase in dB across the therapy session. Pt has demonstrated progress from 6/17/24 and 6/24/24 in dB for sustained phonation of /a/ and words spoken with intent, respectfully. This indicates that the SPEAK OUT! program has supported his progress in these areas. Pt has met STG2: Patient/family will establish a daily home practice routine of completing a SPEAK OUT! lesson 2x/day across seven consecutive days. He is completing 2-3 lessons daily for home practice. Due to the degenerative nature of his diseases, pt will continue to benefit from skilled ST intervention at this time. The [facility] will be closed until August 26th. Pt will resume therapy at least once a week in the Fall and will be added to a SPEAK OUT! group.      This is visit 38/40  Functional Assessment Used       Referring provider co-signature:  I have reviewed this plan of care and my co-signature certifies the need for services.    Certification Period: 07/29/2024 to {Future Date:89809}    Physician Signature: ________________________________ Date: ______________          "

## 2024-08-19 ENCOUNTER — TELEPHONE (OUTPATIENT)
Dept: PHARMACY | Facility: MEDICAL CENTER | Age: 79
End: 2024-08-19

## 2024-08-19 ENCOUNTER — OFFICE VISIT (OUTPATIENT)
Dept: NEUROLOGY | Facility: MEDICAL CENTER | Age: 79
End: 2024-08-19
Attending: PSYCHIATRY & NEUROLOGY
Payer: MEDICARE

## 2024-08-19 VITALS
BODY MASS INDEX: 23.03 KG/M2 | HEIGHT: 65 IN | DIASTOLIC BLOOD PRESSURE: 58 MMHG | HEART RATE: 71 BPM | OXYGEN SATURATION: 94 % | TEMPERATURE: 97.1 F | SYSTOLIC BLOOD PRESSURE: 112 MMHG | WEIGHT: 138.23 LBS

## 2024-08-19 DIAGNOSIS — G20.C PARKINSONISM, UNSPECIFIED PARKINSONISM TYPE (HCC): ICD-10-CM

## 2024-08-19 DIAGNOSIS — G47.33 OSA (OBSTRUCTIVE SLEEP APNEA): ICD-10-CM

## 2024-08-19 DIAGNOSIS — F33.1 MODERATE RECURRENT MAJOR DEPRESSION (HCC): ICD-10-CM

## 2024-08-19 DIAGNOSIS — R26.81 UNSTEADY GAIT WHEN WALKING: ICD-10-CM

## 2024-08-19 DIAGNOSIS — G30.9 ALZHEIMER'S DISEASE, UNSPECIFIED (CODE) (HCC): Primary | ICD-10-CM

## 2024-08-19 PROCEDURE — 3074F SYST BP LT 130 MM HG: CPT | Performed by: PSYCHIATRY & NEUROLOGY

## 2024-08-19 PROCEDURE — 99212 OFFICE O/P EST SF 10 MIN: CPT

## 2024-08-19 PROCEDURE — 3078F DIAST BP <80 MM HG: CPT | Performed by: PSYCHIATRY & NEUROLOGY

## 2024-08-19 PROCEDURE — 99212 OFFICE O/P EST SF 10 MIN: CPT | Performed by: PSYCHIATRY & NEUROLOGY

## 2024-08-19 RX ORDER — CARBIDOPA AND LEVODOPA 25; 100 MG/1; MG/1
TABLET ORAL
Qty: 200 TABLET | Refills: 5 | Status: SHIPPED | OUTPATIENT
Start: 2024-08-19 | End: 2025-07-28

## 2024-08-19 ASSESSMENT — MONTREAL COGNITIVE ASSESSMENT (MOCA)
DELAYED RECALL SUBSCORE: 0/5
ORIENTATION SUBSCORE: 6/6
WHAT IS THE VERSION OF MOCA ADMINISTERED: 7.3
6. READ LIST OF DIGITS [FORWARD/BACKWARD]: 2/2
7. [VIGILENCE] TAP WHEN HEARING DESIGNATED LETTER: 1/1
WHAT IS THE TOTAL SCORE (OUT OF 30): 17
4. NAME EACH OF THE THREE ANIMALS SHOWN: 3/3
9. REPEAT EACH SENTENCE: 1/2
1. ALTERNATING TRAIL MAKING: 0/1
3. DRAW A CLOCK: CONTOUR, NUMBERS, HANDS: 1/3
CATEGORY CUE (IF APPLICABLE): 4
10. [FLUENCY] NAME WORDS STARTING WITH DESIGNATED LETTER: 0/1
8. SERIAL SUBTRACTION OF 7S: 1/5
11. FOR EACH PAIR OF WORDS, WHAT CATEGORY DO THEY BELONG TO (OUT OF 2): 1/2
5. MEMORY TRIALS: SECOND TRIAL
CATEGORY CUE (IF APPLICABLE): 1
2. COPY DRAWING: 1/1

## 2024-08-19 ASSESSMENT — PATIENT HEALTH QUESTIONNAIRE - PHQ9
5. POOR APPETITE OR OVEREATING: 0 - NOT AT ALL
SUM OF ALL RESPONSES TO PHQ QUESTIONS 1-9: 10
CLINICAL INTERPRETATION OF PHQ2 SCORE: 3

## 2024-08-19 NOTE — PROGRESS NOTES
"Followed by Dr. Tran Arevalo for 2 year or so for Dementia issue.    Last seen by Tran in 2022 at [facility].     He is here with Samantha and worked as a capable  - retired in .     Last with me in 2023 and here with his wife today (DPOA)    Problem List reviewed:     Historically about  4 years or so go when he started to have problems with his remote control (like checking the DVR)- he would hit the wrong buttons and then frustrated. There was also some \"short term memory loss\"> wife would talk about going somewhere and he would forget the conversation with 5 minutes and with slight forgetfulness over the last 2-3 years or so.     For more than 3-4 days of the week he will repeat himself within 5 to 10 minutes for well over 2 years or so.     The wife  has noticed he has problems with dates and times for over 2 years.     2 or 3 times a week he may see a formed person or animal or \"is something moving throughout the yard\"    The wife does  not endorse any notable cognitive/behavioral fluctuations in the last 12 months.     He has for the last 5-6 years or so wife has noticed he body his kick or jump which can last 1-2 hours and he can infrequently snore and he uses a dental device and he tried a CPAP machine (which drove him insane due to the mask).     Voice has clearly gotten softer in the last 12 months and infrequently is so soft to be described as \"mumbling\" for minutes at a time.    He has also been drooling of out of the right side of his mouth in the last 6 months.     He has  had no falls or near falls in the last 6 months.    He has not had any dysphagia in the last 3 to 6 months.     He denies lack or loss of appetite in the recent months.     He  used to have a left hand rest tremor and then moved to the right hand and handwriting has gotten smaller.       Family Hx: Father- Parkinson's Disease- in mid 50's and  at age 78.        Neuro:    Awake,alert, cooperative, " fully oriented  Speech- soft but understood  Follows 3 step motor commands in sequence    Cranial Nerves 2 to 12 intact  Acuity- 20/30 Snellen (bilaterally)    Motor exam:    Flattening of the face.     Muscle tone is normal in all 4 limbs.    Voice is soft but understanding.     The Left vs Right hand >>  mildly slow when opening and closing hand vs left hand.     The Left more so that  right foot (reduced speed in tapping).     TheLeft leg vs right is slower> requiring more concentration during this task.     Muscle strength:     Neck Flexors/Extensors: 5/5                                         Right               Left  Deltoid                         5/5                   5/5                                             Biceps                         5/5                   5/5  Triceps                         5/5                   5/5         Wrist extensors           5/5                   5/5  Wrist flexors                5/5                   5/5                               5/5                   5/5  Interossei                    5/5                   5/5  Thenar (APB)              5/5                   5/5         Hip flexors                   5/5                   5/5  Quadriceps                  5/5                   5/5                     Hamstrings                  5/5                   5/5  Dorsiflexors                 5/5                   5/5  Plantarflexors              5/5                   5/5  Toe extension              5/5                   5/5           Reflexes:                                        Right               Left  Biceps                         2/4                   2/4  Triceps                        2/4                   2/4  Brachioradialis            2/4                   2/4  Knee jerk                     2/4                   2/4  Ankle jerk                    2/4                   2/4            Frontal release signs are absent     Bilaterally toes are downgoing to  plantar stimulation..     Coordination (finger-to-nose, heel/knee/shin, rapid alternating movements) was normal.      There was no ataxia, no tremors, and no dysmetria.      Station and gait >     Easily stands up from exam chair without retropulsion,veering,leaning,swaying (to either side).      Negative Pull Test.     Negative Rombergism.    CT-PET METABOLIC EVALUATION - BRAIN  Order: [id number]   Status: Final result       Visible to patient: No ([facility])       Next appt: 09/11/2024 at 01:00 PM in Speech Therapy (Erin Golden, SLP)       Dx: Degenerative disease of nervous syste...    0 Result Notes  Details    Reading Physician Reading Date Result Priority   Piero Tilley M.D.  [phone] 6/6/2023 Routine     Narrative & Impression     6/6/2023 1:25 PM     HISTORY/REASON FOR EXAM:  evaluate for Lewy Body Disease  Mild narrowing degenerative dementia.     TECHNIQUE/EXAM DESCRIPTION AND NUMBER OF VIEWS:  PET CT Metabolic evaluation of the brain.     Initially, 10.76 mCi F-18 FDG was administered intravenously under standardized conditions. Approximately 30 minutes after FDG administration, the patient was placed in the supine position on the PET CT table for brain imaging. CT images, PET images and   fused CT/PET images were reviewed on a computer workstation. Blood glucose level was 84 mg/dL. The low dose unenhanced CT images were used for attenuation correction and anatomic correlation only.     COMPARISON: CT head 1/22/2021     FINDINGS:  Mild decreased activity in the parietal regions bilaterally.  Brain volume: No significant atrophy  Additional findings: None. .     IMPRESSION:     Mild decreased activity in the parietal regions bilaterally, with relative sparing of the occipital lobes, favoring Alzheimer's type dementia over dementia with Lewy bodies.           Exam Ended: 06/06/23  2:23 PM Last Resulted: 06/06/23  2:47 PM          Contains abnormal data VITAMIN B12  Order: [id number]    Status: Final result       Visible to patient: No (inaccessible in MyChart)       Next appt: 09/11/2024 at 01:00 PM in Speech Therapy (Erin Golden, SLP)       Dx: Mild neurodegenerative dementia (HCC)    0 Result Notes      Component  Ref Range & Units 9 mo ago   Vitamin B12 -True Cobalamin  211 - 911 pg/mL 921 High    Resulting Agency M              Narrative  Performed by: M  Request patient fasting?->Yes  Fasting Instructions:->Fast 8-10 hours, OK to drink water as  needed during fast, take medications per your provider's  instruction.      Specimen Collected: 11/20/23 12:24 PM Last Resulted: 11/20/23  8:25 PM        CT-PET METABOLIC EVALUATION - BRAIN  Order: [id number]   Status: Final result       Visible to patient: No (inaccessible in MyChart)       Next appt: 09/11/2024 at 01:00 PM in Speech Therapy (Erin Golden, SLP)       Dx: Degenerative disease of nervous syste...    0 Result Notes  Details    Reading Physician Reading Date Result Priority   Piero Tilley M.D.  [phone] 6/6/2023 Routine     Narrative & Impression     6/6/2023 1:25 PM     HISTORY/REASON FOR EXAM:  evaluate for Lewy Body Disease  Mild narrowing degenerative dementia.     TECHNIQUE/EXAM DESCRIPTION AND NUMBER OF VIEWS:  PET CT Metabolic evaluation of the brain.     Initially, 10.76 mCi F-18 FDG was administered intravenously under standardized conditions. Approximately 30 minutes after FDG administration, the patient was placed in the supine position on the PET CT table for brain imaging. CT images, PET images and   fused CT/PET images were reviewed on a computer workstation. Blood glucose level was 84 mg/dL. The low dose unenhanced CT images were used for attenuation correction and anatomic correlation only.     COMPARISON: CT head 1/22/2021     FINDINGS:  Mild decreased activity in the parietal regions bilaterally.  Brain volume: No significant atrophy  Additional findings: None. .     IMPRESSION:     Mild decreased activity in  the parietal regions bilaterally, with relative sparing of the occipital lobes, favoring Alzheimer's type dementia over dementia with Lewy bodies.           Exam Ended: 06/06/23  2:23 PM Last Resulted: 06/06/23  2:47 PM        FOLATE  Order: [id number]   Status: Final result       Visible to patient: No (inaccessible in MyChart)       Next appt: 09/11/2024 at 01:00 PM in Speech Therapy (Erin Golden, SLP)       Dx: Mild neurodegenerative dementia (HCC)    0 Result Notes      Component  Ref Range & Units 9 mo ago   Folate -Folic Acid  >4.0 ng/mL 10.7   Resulting Agency M              Narrative  Performed by: M  Request patient fasting?->Yes  Fasting Instructions:->Fast 8-10 hours, OK to drink water as  needed during fast, take medications per your provider's  instruction.      Specimen Collected: 11/20/23 12:24 PM Last Resulted: 11/20/23  8:25 PM        VITAMIN D,25 HYDROXY (DEFICIENCY)  Order: [id number]   Status: Final result       Visible to patient: No (inaccessible in MyChart)       Next appt: 09/11/2024 at 01:00 PM in Speech Therapy (Erin Golden, SLP)       Dx: Mild neurodegenerative dementia (HCC)    0 Result Notes      Component  Ref Range & Units 9 mo ago   25-Hydroxy   Vitamin D 25  30 - 100 ng/mL 35   Comment: Adult Ranges:   <20 ng/mL - Deficiency  20-29 ng/mL - Insufficiency   ng/mL - Sufficiency  Electrochemiluminescence binding assay performed using Roche ashley e  immunoassay analyzer.  The Elecsys Vitamin D total II assay is intended for  the quantitative determination of total 25 hydroxyvitamin D in human serum  and plasma. This assay is to be used as an aid in the assessment of vitamin  D sufficiency in adults.   Resulting Agency M              Narrative  Performed by: M  Request patient fasting?->Yes  Fasting Instructions:->Fast 8-10 hours, OK to drink water as  needed during fast, take medications per your provider's  instruction.      Specimen Collected: 11/20/23 12:24 PM Last Resulted:  11/20/23  8:25 PM        Lab Flowsheet        Order Details        View Encounter        Lab and Collection Details        Routing        Result History     View All Conversations on this Encounter          ERIC Scan done about 2 to 3 years and was abnormal.    Impression/Plans/Recommendations:     Parkinsonism- onset 3 years ago with associated cognitive/memory disturbance.     Abnormal ERIC Scan in 1/2022.     Differential includes Parkinson's Dz vs Lewy Body.     The clinical issues that support Lewy Body Disease including very mild visual hallucination.     There has also been  REM Sleep Behavioral symptoms and he has started a new dental device for about 3 months and there seems to be improvement in his overall sleep habits and wife has not noticed any total body movements.     However he has no history of cognitive fluctuations per wife in the last 3-6 months.     2. Early stage of a  Dementia- related to #1> based on information provided by wife and verified on the Global Deterioration Score today (18 today per wife)> wife gave him 3 (2's) and multiple 2(s) today.     MOCA score today of 17/30 today. (See Media Section)     Functional Activities Questionnaire of 19 today per wife- See Media Section.     We discussed the consideration of Dozepezil (10 mg a day) vs other cognitive medications like Namenda and he is in agreement to not use them at this time or point.     3. Carotid Endarctectomy- Left (about 6 years ago)      4. O.S.A- chronic and had been using CPAP for 1.5 years and then more recently used a dental device for nearly 6 years and due to fitting issues has had a new oral device made and is use.    5. Moderate Severity Depression: Prozac 20 mg a day> prescribed by PCP.  PHQ 9 of 10 today     Plans:        A. Formal Neuropsychological evaluation with Dr. Zavala scheduled in early 10/2024.        B . We re discussed options for dopamine usage and after our discussion today; Augusto would like to try to  "slowly increase the dose of Sinemet up to 1 tablets TID (30-60 minutes for meals). hold off on Dopamine medications at this time- we reviewed Sinemet today and it's pros and cons.    Will start Sinemet 25/100 tablet size> 1/2 tablet PO TID (30-60 minutes) x 2 weeks and then 1 tablet PO TID will goal of improving walking ability and balance and voice strength and handwriting (illegible and small). Side effect profile of Sinemet reviewed today.     C.  Cardiac Pacemaker in place and cardiac follow up visit to be done in the recent months.    D.  Will hold off on the IV anti amyloid medications after reviewing today Leqembi and the newest medication called Kinsunla.    E.  He tried 5 mg of Donepezil x 2.5 months> likely had joint pain(s) from this medication so it's since been stopped.    F. Caregiver issues reviewed today with wife.    G. Behavioral Health referral placed today for continued medication management for Depression (presently on Prozac 20 mg a day)           Visit today was 67  minutes and we discussed the above issues in the \"Plans\" section including starting a new medication called Sinemet.    Follow up in about 6 months or so.    "

## 2024-08-19 NOTE — TELEPHONE ENCOUNTER
Received New Start request via [employer]  for carbidopa-levodopa (SINEMET)  MG Tab . (Quantity:200, Day Supply:80)     Insurance: [employer]  Member ID:  [id number]  BIN: [id number]   PCN: [id number]  Group: [id number]     Ran Test claim via [place] & medication Pays for a $30 copay. Will outreach to patient to offer specialty pharmacy services and or release to preferred pharmacy

## 2024-09-09 ENCOUNTER — SPEECH THERAPY (OUTPATIENT)
Dept: SPEECH THERAPY | Facility: OTHER | Age: 79
End: 2024-09-09
Payer: MEDICARE

## 2024-09-09 DIAGNOSIS — G20.A1 HYPOKINETIC PARKINSONIAN DYSPHONIA (HCC): ICD-10-CM

## 2024-09-09 DIAGNOSIS — R49.0 HYPOKINETIC PARKINSONIAN DYSPHONIA (HCC): ICD-10-CM

## 2024-09-09 DIAGNOSIS — F02.80 PARKINSON'S DISEASE, LEWY BODY (HCC): ICD-10-CM

## 2024-09-09 DIAGNOSIS — G31.83 PARKINSON'S DISEASE, LEWY BODY (HCC): ICD-10-CM

## 2024-09-09 PROCEDURE — 92507 TX SP LANG VOICE COMM INDIV: CPT | Mod: GN,GC | Performed by: SPEECH-LANGUAGE PATHOLOGIST

## 2024-09-11 NOTE — OP THERAPY DAILY TREATMENT
"  Outpatient Speech Therapy  DAILY TREATMENT     [facility]  [address] [address]  Phone:  [phone]  Fax:  [phone]    Date: 09/09/2024    Patient: Augusto Montemayor Jr.  YOB: 1945  MRN: [id number]     Time Calculation    Start time: 1000  Stop time: 1100 Time Calculation (min): 60 minutes         Chief Complaint: Speech Therapy    Visit #: 39    Subjective Evaluation  Pt was accompanied by his wife, Samantha, who remained in the waiting room throughout the session. Pt described a lack of motivation to complete assigned home practice due to the lack of partner interaction. Hydration was discussed per the Pt's explanation of noticeable increase of phlegm and a \"foggy memory.\" The clinician explained the importance of hydration [place] and the fact that men need to intake more water than women. Reading and tracking difficulties were noted by the Pt, stating that their macular degeneration contributes to the difficulties, causing frustration. Because of this, the clinician suggested reading cowboy poetry instead of longer passages because  there is less visual tracking required and fewer words to mitigate reading tasks hindrance. The Pt stated it was easier to read, and he wants to use cowboy poems in future sessions to support his reading with intent. The Pt was cooperative through out the session.          Speech Therapy Objective    1. Speaking with intent ? 0%  X 25%  ? 50% ? 75% ? 100%  (No cues - Spontaneous conversation)   The Pt required multiple verbal (e.g., \"throw your voice at the wall\") and visual cues (e.g., a dart board to serve as a target). Although many cues were given by the clinician, the Pt inconsistently repeated phrases with intent. This suggest that the limited at home practice combined with the break in sessions negatively impacted his speech.    2. During a spontaneous conversation task, " the Pt received verbal and visual cues to speak with intent. During the task, dB readings were collected resulting in an average of 61.5 dB. Two of the readings fell into the normal conversational range, (e.g., 60 dB and 70 db)(American Academy of Audiology, 2010), with the other readings (e.g., 6 out of 8) falling below 60 dB. His highest reading (e.g., 70 dB) was produced after a verbal cue, suggesting that the Pt benefits from cues to achieve a normal conversational volume of 60 dB.    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 His diagnosis of moderate hypokinetic dysarthria is characterized by a weak breathy voice, low volume, and variable speech rate.      Speech Therapy Plan  Pt will independently complete SPEAK OUT! warm-up exercises.  Pt will continue to receive verbal and visual cues during spontaneous conversations about multiple topics.  Pt will read poetry to support reading with intent.   Pt will be provided with instructions to access online SPEAK OUT! Lessons on the [employer]'s website.     This is visit 39/40

## 2024-09-16 ENCOUNTER — SPEECH THERAPY (OUTPATIENT)
Dept: SPEECH THERAPY | Facility: OTHER | Age: 79
End: 2024-09-16
Payer: MEDICARE

## 2024-09-16 DIAGNOSIS — F02.80 PARKINSON'S DISEASE, LEWY BODY (HCC): ICD-10-CM

## 2024-09-16 DIAGNOSIS — G20.A1 HYPOKINETIC PARKINSONIAN DYSPHONIA (HCC): ICD-10-CM

## 2024-09-16 DIAGNOSIS — G31.83 PARKINSON'S DISEASE, LEWY BODY (HCC): ICD-10-CM

## 2024-09-16 DIAGNOSIS — R49.0 HYPOKINETIC PARKINSONIAN DYSPHONIA (HCC): ICD-10-CM

## 2024-09-16 PROCEDURE — 92507 TX SP LANG VOICE COMM INDIV: CPT | Mod: GN,GC | Performed by: SPEECH-LANGUAGE PATHOLOGIST

## 2024-09-23 ENCOUNTER — SPEECH THERAPY (OUTPATIENT)
Dept: SPEECH THERAPY | Facility: OTHER | Age: 79
End: 2024-09-23
Payer: MEDICARE

## 2024-09-23 DIAGNOSIS — F02.80 PARKINSON'S DISEASE, LEWY BODY (HCC): ICD-10-CM

## 2024-09-23 DIAGNOSIS — R49.0 HYPOKINETIC PARKINSONIAN DYSPHONIA (HCC): ICD-10-CM

## 2024-09-23 DIAGNOSIS — G31.83 PARKINSON'S DISEASE, LEWY BODY (HCC): ICD-10-CM

## 2024-09-23 DIAGNOSIS — G20.A1 HYPOKINETIC PARKINSONIAN DYSPHONIA (HCC): ICD-10-CM

## 2024-09-23 PROCEDURE — 92507 TX SP LANG VOICE COMM INDIV: CPT | Mod: GN,GC | Performed by: SPEECH-LANGUAGE PATHOLOGIST

## 2024-09-24 NOTE — OP THERAPY PROGRESS SUMMARY
"  Outpatient Speech Therapy  PROGRESS SUMMARY NOTE      [facility] Speech Pathology & Audiology  [address] [address]  Phone:  [phone]  Fax:  [phone]    Date of Visit: 09/16/2024    Patient: Augusto Montemayor Jr.  YOB: 1945  MRN: [id number]     Referring Provider: MORGAN Garvey  [address]  [address] [address]   Referring Diagnosis No admission diagnoses are documented for this encounter.      Visit #: 40    Progress Report Period: 9/9/24-9/16/24    Time Calculation      Start time: 1400 Stop time: 1500 Time Calculation (min): 60 Minutes         Chief Complaint: Speech Therapy    Visit Diagnoses     ICD-10-CM   1. Parkinson's disease, Lewy body (HCC)  G31.83    F02.80   2. Hypokinetic Parkinsonian dysphonia (HCC)  G20.A1    R49.0       Subjective Evaluation  The Pt arrived to the [facility] on time and was accompanied by his wife, Samantha, who remained in the waiting room throughout the session. The Pt explained that they were tired because their daughter called late last night and needed to move in. Because of the late phone call and the sudden changes, he stated that he was experiencing more confusion. It was also stated that the Pt has been tired lately, explaining that he sleeps 2-4 hours during the day, and falling asleep between 11 or 12 at night, while waking at about 9 am. He stated that his tiredness was due to Parkinson's.       Speech Therapy Objective      1. Speaking with intent ? 0%  X 25%  ? 50% ? 75% ? 100%  (No cues - Spontaneous conversation)      The Pt received verbal cues (e.g., \"throw your voice at the wall\") during spontaneous conversation. Although he received cues, he reverted back to his \"normal\" speaking amplitude. After discussing why someone with Parkinson's wants to speak with intent, as well as providing visual biofeedback from a dB meter, he improved his intent.     2. Vocal warm-ups. The Pt " "performed his vocal warm-up excersises between 70-85 dB throughout the exercises. The Pt noted he felt like his voice steps down in increments during the glides. He was instructed to glide up and down without trying to reach pitches that were too high and too low. While completing the task in unison with the clinician, the Pt demonstrated a smoother glide up and glide down. This suggests that the Pt still benefits from cues from the clinician to complete the warm-up exercises.        Exercises Demonstrates Moffat   Warm-up Yes - min cues given   Ah Yes - min visual and verbal cueing   Glide No - mod visual and verbal cueing         3 Reading task. The Pt was presented with a cowboy poem to support his reading with intent. The initial page of texts was stated as \"being too much\" because there were too many words on the page. When the stanzas were isolated (e.g., folding the paper so only one stanza was visible), the Pt stated he was able to track and read more easily. The reading task was terminated after 5 stanzas due to Pt's stated frustration and fatigue.      This suggests that the Pt wants to increase their reading, but relies on compensatory strategies to eliminate distractions, and visual disturbance from the text.      4. Speaking with intent. The Pt. Discussed the difference between being a father compared to being a grandfather. They successfully answered the question averaging 67 dB. The Pt received 3 verbal cues to \"throw his voice at the wall\" suggesting that he continues to rely on cues to speak with intent.    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 His diagnosis of moderate hypokinetic dysarthria is characterized by a weak breathy voice, low volume, and variable speech rate.     Speech Therapy Plan :   Goals  Short Term Goals:    STG1: Patient will independently demonstrate the six SPEAK OUT! exercises " "with minimum visual and verbal cueing across three consecutive sessions.    STG2: Patient/family will establish a daily home practice routine of completing a SPEAK OUT! online lesson 2x/day across seven consecutive days.    STG3: Patient will demonstrate how to \"speak with intent\" by modeling the difference between speaking with and without intent in 3 out of 4 trials.     STG4: Patient will successfully transition to a SPEAK OUT! Therapy Group upon completion of the individual SPEAK OUT! program as measured by demonstrating taught strategies with 90% accuracy and minimal verbal and visual cueing.    Short Term Goal Duration (Weeks):  2-4 months  Patient progression on Short Term Goals:      STG2: Patient/family will establish a daily home practice routine of completing a SPEAK OUT! online lesson 2x/day across seven consecutive days.    Goal updated to promote engagement and diversity of lessons to support Augusto's motivation to continue home practice.     Long Term Goals:    Pt will demonstrate carryover of an \"intentional voice\" from therapy to his home environment as reported by his wife across 3 consecutive sessions.    Long Term Goal Duration (Weeks):  4-6 months  Goal Comments:  STG2 was updated based on Pt's report of regression with his home practice stating, \"It just feels like I'm yelling at the wall.\" He and his spouse were educated on how to access SPEAK OUT! online to break up the monotony of the lessons.    Due to the degenerative nature of his diseases, pt will continue to benefit from skilled ST intervention at this time. The [facility] will be closed until September 9th. Pt will resume therapy at least once a week in the Fall and will be added to a SPEAK OUT! group.    This is visit 40/40      Functional Assessment Used       Referring provider co-signature:  I have reviewed this plan of care and my co-signature certifies the need for services.    Certification Period: 09/16/2024 to " 12/24/24    Physician Signature: ________________________________ Date: ______________

## 2024-09-25 NOTE — OP THERAPY DAILY TREATMENT
"  Outpatient Speech Therapy  DAILY TREATMENT     [facility]  [address] [place] [id number]  Phone:  [phone]  Fax:  [phone]    Date: 09/23/2024    Patient: Augusto Montemayor Jr.  YOB: 1945  MRN: [id number]     Time Calculation    Start time: 1000  Stop time: 1055 Time Calculation (min): 55 minutes         Chief Complaint: Speech Therapy    Visit #: 41    Subjective Evaluation  Augusto arrived to the [facility] on time. He shared an story about how he put his daughter's car keys in a \"special\" spot while she was away attending a retreat. Upon her return, he couldn't find where he stowed the keys, causing frustration.     Speech Therapy Objective   1. Speaking with intent ? 0%  X 25%  ? 50% ? 75% ? 100%  (No cues - Spontaneous conversation)     Augusto received multiple verbal cues during the initial conversation task. He was educated about how speaking with intent can prolong the use of his voice as his diagnosis of Parkinson's disease progresses. As the session continued, he required fewer verbal cues to communicate with intent, suggesting that cues continue to support his intent during our sessions.     2. Vocal warm-ups. The Pt performed the following SPEAK OUT! tasks: Warm-up, Ah, and Glides.     Exercises Demonstrates Maben   Warm-up Yes - min cues given   Ah Yes - min visual and verbal cueing   Glide Yes - min visual and verbal cueing     Per Augusto's statement from the previous session that his \"voice\" steps down instead of glides down while completing the glide tasks, he received a verbal and visual reminder that his glide should drop in pitch as if it was on a slide. He completed the tasks with a smooth drop in frequency with minimal cueing after the initial reminder. This suggests that and initial reminder and explanation to avoid \"starting too high and going to low\" supports his success completing the task.     3. " "Structured conversation. While using intent, Augusto conversed about \"his favorite food\" and \"advise he would give to his younger self.\" He relied on verbal cues to speak with intent, however, the frequency of cueing was less frequent than during the \"speaking with intent\" task, suggesting there is stimulability to speak with intent successfully while relying on less verbal cues.       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 His diagnosis of moderate hypokinetic dysarthria is characterized by a weak breathy voice, low volume, and variable speech rate.   Speech Therapy Plan    Continue to measure intent during the initial conversation  Use biofeedback (e.g., audio recording) to support Augusto's intent  Continue Vocal warm-ups  Continue structured conversations      Visit 1/40    "

## 2024-09-30 ENCOUNTER — APPOINTMENT (OUTPATIENT)
Dept: SPEECH THERAPY | Facility: OTHER | Age: 79
End: 2024-09-30
Payer: MEDICARE

## 2024-09-30 DIAGNOSIS — G31.83 PARKINSON'S DISEASE, LEWY BODY (HCC): ICD-10-CM

## 2024-09-30 DIAGNOSIS — F02.80 PARKINSON'S DISEASE, LEWY BODY (HCC): ICD-10-CM

## 2024-09-30 DIAGNOSIS — R49.0 HYPOKINETIC PARKINSONIAN DYSPHONIA (HCC): ICD-10-CM

## 2024-09-30 DIAGNOSIS — G20.A1 HYPOKINETIC PARKINSONIAN DYSPHONIA (HCC): ICD-10-CM

## 2024-09-30 PROCEDURE — 92507 TX SP LANG VOICE COMM INDIV: CPT | Mod: GN,GC | Performed by: SPEECH-LANGUAGE PATHOLOGIST

## 2024-10-01 ENCOUNTER — APPOINTMENT (OUTPATIENT)
Dept: BEHAVIORAL HEALTH | Facility: CLINIC | Age: 79
End: 2024-10-01
Attending: CLINICAL NEUROPSYCHOLOGIST
Payer: MEDICARE

## 2024-10-01 DIAGNOSIS — F02.A2 MILD LEWY BODY DEMENTIA WITH PSYCHOTIC DISTURBANCE (HCC): ICD-10-CM

## 2024-10-01 DIAGNOSIS — G31.83 MILD LEWY BODY DEMENTIA WITH PSYCHOTIC DISTURBANCE (HCC): ICD-10-CM

## 2024-10-01 DIAGNOSIS — F03.A0 MILD NEURODEGENERATIVE DEMENTIA (HCC): ICD-10-CM

## 2024-10-01 PROCEDURE — 96138 PSYCL/NRPSYC TECH 1ST: CPT | Performed by: CLINICAL NEUROPSYCHOLOGIST

## 2024-10-01 PROCEDURE — 96139 PSYCL/NRPSYC TST TECH EA: CPT | Performed by: CLINICAL NEUROPSYCHOLOGIST

## 2024-10-07 ENCOUNTER — SPEECH THERAPY (OUTPATIENT)
Dept: SPEECH THERAPY | Facility: OTHER | Age: 79
End: 2024-10-07
Payer: MEDICARE

## 2024-10-07 DIAGNOSIS — F02.80 PARKINSON'S DISEASE, LEWY BODY (HCC): ICD-10-CM

## 2024-10-07 DIAGNOSIS — G31.83 PARKINSON'S DISEASE, LEWY BODY (HCC): ICD-10-CM

## 2024-10-07 DIAGNOSIS — R49.0 HYPOKINETIC PARKINSONIAN DYSPHONIA (HCC): ICD-10-CM

## 2024-10-07 DIAGNOSIS — G20.A1 HYPOKINETIC PARKINSONIAN DYSPHONIA (HCC): ICD-10-CM

## 2024-10-07 PROCEDURE — 92507 TX SP LANG VOICE COMM INDIV: CPT | Mod: GN,GC | Performed by: SPEECH-LANGUAGE PATHOLOGIST

## 2024-10-14 ENCOUNTER — SPEECH THERAPY (OUTPATIENT)
Dept: SPEECH THERAPY | Facility: OTHER | Age: 79
End: 2024-10-14
Payer: MEDICARE

## 2024-10-14 DIAGNOSIS — R49.0 HYPOKINETIC PARKINSONIAN DYSPHONIA (HCC): ICD-10-CM

## 2024-10-14 DIAGNOSIS — G20.A1 HYPOKINETIC PARKINSONIAN DYSPHONIA (HCC): ICD-10-CM

## 2024-10-14 DIAGNOSIS — F02.80 PARKINSON'S DISEASE, LEWY BODY (HCC): ICD-10-CM

## 2024-10-14 DIAGNOSIS — G31.83 PARKINSON'S DISEASE, LEWY BODY (HCC): ICD-10-CM

## 2024-10-28 ENCOUNTER — APPOINTMENT (OUTPATIENT)
Dept: BEHAVIORAL HEALTH | Facility: CLINIC | Age: 79
End: 2024-10-28
Payer: MEDICARE

## 2024-10-28 DIAGNOSIS — F02.A2 MILD LEWY BODY DEMENTIA WITH PSYCHOTIC DISTURBANCE (HCC): ICD-10-CM

## 2024-10-28 DIAGNOSIS — F03.A0 MILD NEURODEGENERATIVE DEMENTIA (HCC): ICD-10-CM

## 2024-10-28 DIAGNOSIS — G31.83 MILD LEWY BODY DEMENTIA WITH PSYCHOTIC DISTURBANCE (HCC): ICD-10-CM

## 2024-10-28 NOTE — PROGRESS NOTES
Interactive Neuropsychological Feedback Session   "seizures (none since 1980), major depressive disorder (MDD), and generalized anxiety disorder (BRAYAN).     The patient and his wife reported he began experiencing movement problems coupled with cognitive difficulties approximately in 2021. They noted his cognitive and motor symptoms have progressively worsened over time. Currently, they reported his more prominent parkinsonian symptoms include shuffling gait, reduced balance (prone to fall backwards), rigidity, intermittent bilateral hand tremors, involuntary jerking movements, dysphagia, hypomimia, reduced speech volume, and fine motor problems (e.g., difficulty writing and buttoning shirts). Dr. Mckeon prescribed carbidopa-levadopa for the patient in August 2024. However, the patient noted he has not started the medication because the severity of his parkinsonian symptoms remains manageable without it. He also reported a remote history of seizures. He noted he was diagnosed with epilepsy in the Fall of 1969. The seizures were effectively managed with Dilantin, which he took until 1988. He stated he has not had any seizures since 1980.     Previous Studies: Neurological exam (08/19/2024) was remarkable for: \"Muscle tone is normal in all 4 limbs. Voice is soft but understanding. The Left vs Right hand >> mildly slow when opening and closing hand vs left hand. The Left more so that right foot (reduced speed in tapping). The Left leg vs right is slower> requiring more concentration during this task.\" Cognitive screener (08/19/2024) was below expectation (Matthew Cognitive Assessment; MOCA = 17/30). Points were lost for set-shifting (-1), cube copy (-1), clock drawing (-2), serial 7 subtractions (-2), sentence repetition (-1), verbal fluency (-1), abstraction (-1), and short delay word recall (-5). PET-CT of the brain (06/06/2023) revealed: \"Mild decreased activity in the parietal regions bilaterally, with relative sparing of the occipital lobes, favoring Alzheimer's type " "dementia over dementia with Lewy bodies.\" CT of the head (01/22/2021) documented: \"No acute abnormalities are noted on CT scan of the head. Decreased attenuation in the cerebral white matter likely indicates microvascular ischemic disease.\" DaTscan (01/13/2022) revealed: \"There is asymmetric slightly reduced uptake in the bilateral caudates, left more than right. Significantly reduced/near absent uptake in the bilateral putamens.\" \"Impression: Abnormal pattern of uptake in the striata is suggestive of a Parkinsonian type syndrome.\"    Medications and Supplements: Vitamin C, magnesium, calcium, fluoxetine, omeprazole, famotidine, multivitamin, docusate sodium, probiotic, and lutein.     Neuropsychological Findings and Impressions    Presenting Concerns Summary: The patient and his wife reported he began experiencing movement problems coupled with cognitive difficulties in 2021 to 2022. Since then, they noted his cognitive difficulties have progressively worsened. Specifically, the patient has difficulties with short-term memory, attention, processing speed, word finding, navigation, multitasking, and aspects of executive functioning. He also reported having well-formed visual hallucinations such as seeing a cat, polar bear, or spider every 2 to 3 days. His wife noted observing that his level of arousal and energy fluctuates throughout the day resulting in him frequently laying down to rest and taking longer naps. She reported the patient's cognitive difficulties are notably exacerbated when he is tired or somnolent, particularly in the evenings. Additionally, she stated the patient has been frequently acting out dreams for several years. He reportedly receives assistance with most instrumental activities of daily living (ADLs) including finances, medication management, and scheduling appointments. He stopped driving due to his cognitive decline and movement problems in February 2023. He remains independent in all " basic ADLs. The patient reported mild symptoms of anxiety and depression during the clinical interview and on self-report questionnaires. He stated these symptoms are currently well controlled with fluoxetine. Additionally, he noted occasional sleep maintenance problems, chronic back pain, and hearing problems (does not wear recommended hearing aids). On a self-report measure that assesses the effects of parkinsonism on his quality of life, the patient reported he experiences the most difficulties with communication, bodily discomfort, activities of daily living, and emotional wellbeing due to parkinsonism.     Results Summary/Interpretation: The patient's premorbid intellectual ability was estimated in the high average range based on word reading. Attention/working memory was characterized by intact basic auditory attention span, variable auditory working memory, and deficient visual working memory. Processing speed was impaired, as his performance on all measures within this domain was below expectation including oral and motor tasks. His reduced processing speed likely interfered with his performance on timed measures across all cognitive domains. Language was notable for an isolated below expectation performance in semantic verbal fluency. All other aspects of language were intact (phonemic verbal fluency, general fund of knowledge, word reading, and visual confrontation naming). On measures of visual perception/construction, clock copy and construction of block designs were below expectation, while copy of simple geometric figures was within normal limits. Memory was variable. [place] verbal memory (wordlist) was notable for deficient encoding and delayed recall, but improvement to low average with recognition cues. Contextual verbal memory (stories) was characterized by intact encoding, delayed recall, and recognition. This pattern of improvement in verbal memory performance with added structure in the form of  stories and recognition cues was indicative of executive function difficulties interfering with encoding and retrieval of information. Visual memory (simple figures) was notable for deficient encoding, delayed recall, and recognition. It is likely that his processing speed and visual perception/construction deficits interfered with his performance on the visual memory task, as it requires encoding information within a short time limit. Executive functioning was notable for below expectation performance on measures of visuomotor set shifting, semantic verbal set shifting, response inhibition, and response inhibition with a set shifting component. This was suggestive of reduced cognitive flexibility. Remaining aspects of executive functioning were intact including freehand clock drawing, phonemic verbal fluency, abstract verbal reasoning, and practical judgment/problem solving in response to hypothetical scenarios.    Impression: The patient's cognitive profile revealed impaired processing speed and semantic verbal fluency coupled with deficits in aspects of attention/working memory, visual perception, and executive functioning that likely interfered with memory performance. His executive functioning deficits were suggestive of reduced cognitive flexibility. 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 Etiologically, his frontal-subcortical (attention/working memory, processing speed, and executive function) and visual perception/construction deficits are most consistent with what is typically seen in patients with Lewy body dementia (LBD). This is corroborated by the reported concurrent initial onset of cognitive and movement problems, fluctuations in cognitive functioning and level of arousal throughout the day, well-formed visual hallucinations, and REM sleep behavior disorder symptoms. Given his history of  vascular risk factors, brain CT scan findings of microvascular ischemic disease, and frontal-subcortical cognitive impairments, cerebrovascular contributions are also possible. Based on his cognitive profile alone, Parkinson's disease dementia (PDD) cannot be ruled out. His overall pattern of performance, including largely intact language and verbal memory, is not consistent with Alzheimer's disease (AD). Other possible factors contributing to his cognitive difficulties include occasional sleep problems, daytime somnolence, chronic pain, hearing loss (not using hearing aids consistently), and his history of seizures. Reported symptoms of depression and anxiety associated with his history of MDD and BRAYAN may be exacerbating his cognitive problems day-to-day, but do not fully account for his deficits.     Recommendations:    Based on the present results, the patient is recommended for a repeat neuropsychological evaluation in 12 to 18 months or sooner if there is a considerable change in cognitive or emotional status. At that time, diagnostic impressions and treatment recommendations will be revised and updated as necessary. Additional testing will permit comparisons over time to better identify stability or possible decline.  Continuous supervision and management of instrumental activities of daily living, particularly financial and medication/health management, are advised in light of his cognitive deficits. Possible options include increased support from family members, hiring a caregiver, or using home health. Information about home health agencies can be found on the Alzheimer's Association's website (www.alz.org). Placement in a location with increased support such as an assisted living facility could be an option to consider in the future. The current level of assistance with daily activities should not be reduced.  Continued accompaniment to medical appointments by trusted others and receipt of written  instructions is recommended to ensure comprehension, later recall, and follow-through of advice given by providers. In light of his cognitive deficits, close monitoring of the patient when he is outside his home is recommended to ensure his safety, particularly in unfamiliar locations.  Continuous oversight and management from family members or trusted others with complex decision-making (e.g., legal, financial, and medical) are also advised. Given his cognitive deficits, it is not recommended that the patient engage in this type of decision-making unsupervised. The severity of his cognitive decline puts him at increased risk for financial frauds and making errors.  The patient reported occasional sleep problems that may be contributing to his cognitive difficulties. As such, he may benefit from education regarding sleep hygiene and healthy sleep habits, including maintenance of a regular sleep/wake cycle and integrating relaxation exercises before bed. Additional strategies include:  The light emitted by phone screens, TVs, and iPads can mimic daylight and suppress the body's natural release of melatonin, making it harder to fall asleep. It is recommended to stop screen time about an hour before bed.  Develop a bedtime routine  Keep the bedroom at a comfortable temperature  Use low lighting before bedtime  Avoid consuming large meals and caffeine close to bedtime  Exercise can promote good sleep, particularly when completed in the morning or early afternoon  Given his reported mood disturbance and anxiety, if interested, the patient may benefit from the initiation of individual counseling sessions and/or a psychiatry consultation to further assess and treat these symptoms and for additional support coping with current stressors. Evidence-based treatments such as cognitive behavioral therapy, acceptance and commitment therapy, and mindfulness-based interventions may be particularly beneficial. He may reach out to me  to place a referral as needed. The following are options for psychotherapy in the community: [facility] (https://www.burrp!.org/health-services/behavioral-health; 466.125.5111 or 382-138-3523); Tran Quezada, PhD (https://www.ClickShift/; 215.183.4941); [facility] (https://www.[facility]line; 338.447.8943); [facility] (https://www.Cardiac Guard.MyPublisher/index.html; 960.673.6765); [facility] (https://Pawngo.[facility]Publisher; 781.827.7221); A directory of providers can also be found on the Psychology Today website (www.psychologytoGiven.to.[facility]Publisher).  Given his cognitive deficits and movement problems, it is recommended that the patient continue to refrain from driving. If this becomes an area of concern, he should complete a formal driving evaluation conducted by an occupational therapist with training and expertise in this area to ensure his safety and that of others on the road: [facility] (https://www.burrp!.[facility]/Health-Services/Physical-Therapy; 801.165.4559); [place] request for  reevaluation ([email or web address].pdf).  Given his report of chronic pain that may exacerbate cognitive difficulties, he is encouraged to engage in pain management treatment as needed.  In light of his history of hearing loss, he is encouraged to wear hearing aids daily to rule out sensory contributions to his cognitive decline.  The patient may benefit from the use of compensatory strategies to help outsource some of the difficulties associated with his cognitive decline. These may include having/setting scheduled routines, having a specific place for important items (e.g., phone, keys, wallet), doing one task at a time, minimizing distractions, repeating to be learned information, and using external aids for reminders (e.g., planner, setting alarms, labels, notebooks, checklists/to-do lists) consistently.  Energy conservation strategies are recommended  to manage mental and physical fatigue coupled with daytime somnolence, such as taking scheduled breaks, breaking down demanding tasks into smaller components, working on projects for shorter periods, maximizing time when he feels the most alert, and working on cognitively demanding projects at his best time of the day.  Given reported mobility problems, in-home changes to minimize fall risk are also recommended. These may include increasing lighting throughout the house, especially at the top and bottom of stairs/steps and ensuring that lighting is readily available when getting up in the middle of the night. Keeping floors clutter-free and removing small throw rugs or using double-sided tape to keep the rugs from slipping. Making sure there are two secure rails on all stairs and installing grab bars in locations where they will be conveniently usable. Using trekking poles, cane, or other ambulation aids (e.g., walker) as needed, particularly in outdoor locations, is also advised.  In light of his medical history, cerebrovascular disease represents a risk factor for future cognitive decline. As such, the patient is encouraged to reduce vascular risk factors per his providers' treatment recommendations (e.g., healthy diet, exercise, and medication adherence). The book Undo It!: How Simple Lifestyle Changes Can Reverse Most Chronic Diseases by Keyon Dubose and Frannie Dubose may be a helpful resource for the patient and his family. The patient and his family may also benefit from resources available through the American Heart Association (https://www.heart.org/ or 1-652.165.9478), which offers psychoeducational information and services for individuals with vascular risk factors.   The patient is encouraged to regularly engage in social and physical recreation, as well as cognitively stimulating activities (e.g., puzzles, games, reading, exercise, social gatherings) to promote brain health and emotional well-being. The  [facility] [facility] [place] provides diverse educational & social opportunities for adult learners (https://www.olli.[facility]; 129.591.2565). The following are options for adult day programs in our community: [facility] (https://www.[place].gov/seniorsrv/adult_day_[facility]/index.php; 299.849.7730); [facility] (https://www.[facility]/; 344.441.9731).  If not already addressed, the patient and his family members are encouraged to discuss legal issues such as power of , advanced directives, and/or establishing a will to assist him in future financial and healthcare decisions. Information regarding these issues can be found at www.caringinfo.org or www.agingwithdignity.org/5wishes.html.  The following books may be helpful for the patient and his family: The 36 Hour Day: A Family Guide to Caring for Persons with Alzheimer's Disease, Related Dementing Illnesses, and Memory Loss in Later Life by Bernice Harp and Niels Fierro, and A Caregiver's Guide to Lewy Body Dementia by Taryn Lizama and Stevie Lizama.  The patient and his family may benefit from resources available through [facility] [place] (https://dementiafriendly[place].org/) and the Alzheimer's Association (www.alz.org or 619-202-3861), which offers psychoeducational information and services for individuals with major neurocognitive disorder.  Additional local resources include: [place] 2-1-1 (www.ollncw642.org/dementia-support) is an online resource connecting community members with needed . [place] [facility] (www.[place]eniorservices.org) provides information about adult day health care centers and community-based services and programs.  The patient's family may benefit from information and resources available through the Family Caregiver [place] (www.caregiver.org) and Caring.com (www.caring.com), which include support groups, resources, and respite  "services for caregivers.   The patient and his family may benefit from resources available through the following organizations, which offer psychoeducational information and services for individuals with movement disorders: The Lewy Body Dementia Association (https://www.lbda.org/; 167.495.3292); The Parkinson Support Center [facility] (https://www.psc.org/support-for-pd; 997.745.7899); and The M[employer] for Parkinson's Research (https://www.girnarsoft.org/).    Per his Electronic Medical Record:    Past Medical History:   Diagnosis Date    Anesthesia     became combative with LAST surgery; unable to urinate and ended up  in the hospital    Arrhythmia     Ventricular Tachycardia    Arthritis     all joints    Bronchitis     Cataract 2018    Removed bilat    GERD (gastroesophageal reflux disease)     Heart burn     Heart murmur     Heart valve disease     \"leaky\"    Hiatus hernia syndrome     no repair    High cholesterol     Indigestion     episodic    Major neurocognitive disorder probably due to vascular disease, without behavioral disturbance ([facility]) 05/28/2021    KAMI (obstructive sleep apnea)     Uses a dental device    Osteoporosis     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    Pain     lumbar spine down L leg/buttock; 5/10    Palpitations 02/21/2012    Psychiatric problem     Depression    Seizure ([facility])     last seizure 05/1980    Stroke ([facility]) Betweem [id number]    possible TIA, blind in one eye less than a minute duration    Syncope and collapse 02/21/2012    hypersensitive R carotid artery; why pt has a pacermaker    Trouble swallowing 07/22/2021    Unspecified hemorrhagic conditions     brusies easily, fragile skin, bleeds easily      Patient Active Problem List   Diagnosis    Syncope and collapse    Palpitations    Carotid sinus hypersensitivity    Pacemaker Clive Cooliris placed 2/23/12    Occlusion and stenosis of carotid artery without mention of cerebral infarction    " Generalized pain    Lumbar stenosis    Obstructive sleep apnea    Major depressive disorder, recurrent episode, moderate (HCC)    Generalized anxiety disorder    Major neurocognitive disorder probably due to vascular disease, without behavioral disturbance (HCC)    Epididymitis    Lewy body dementia (HCC)    Parkinsonism (HCC)    Mild neurodegenerative dementia (HCC)    History of left-sided carotid endarterectomy    Alzheimer's disease, unspecified (CODE) (HCC)      Past Surgical History:   Procedure Laterality Date    CATARACT EXTRACTION WITH IOL Bilateral 2018    LUMBAR DECOMPRESSION  12/02/2013    Performed by Aleksey Garcia M.D. at [facility]    FORAMINOTOMY  12/02/2013    Performed by Aleksey Garcia M.D. at SUR[facility]ERY [facility]    AR NJX AA&/STRD TFRML EPI LUMBAR/SACRAL 1 LEVEL  10/30/2013    Performed by Demond Sullivan M.D. at [facility]    AR NJX AA&/STRD TFRML EPI LUMBAR/SACRAL EA ADDL  10/30/2013    Performed by Demond Sullivan M.D. at [facility]    CAROTID ENDARTERECTOMY  07/24/2013    Performed by Collins Conroy M.D. at SUR[facility]ERY [facility]    RECOVERY  02/23/2012    Performed by SURGERY, [facility]REC[facility]ERY at [facility]GERY [facility]S    OTHER      pacemaker    TONSILLECTOMY        Family History   Problem Relation Age of Onset    Other Mother         wegeners granulomatosis    Psychiatric Illness Mother     Other Father         parkinsons,dementia    Psychiatric Illness Sister       Psychosocial History: The patient was born in [place], [place]. He denied a known history of birth complications or early developmental delays. He noted he earned a high school diploma in [place], [place]. He then completed 3 full years of college.  He reported he was hyperactive, impulsive, and he attempted early school years. His wife mentioned that she suspects that he may have attention deficit hyperactivity disorder (ADHD).  However, the patient noted his attention and  hyperactivity problems were mild and never significantly interfered with his performance in academic or work settings. He stated that he was prescribed Ritalin by a psychiatrist, which he took for 2 to 3 years in the 90s, and was partially beneficial. He reported he was in the [employer] for 3 years. 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 He has resided in [place], [place], for 44 years. He currently lives with his wife of 54 years and one of his daughters in a private residence.  He has 2 daughters and 5 grandchildren, who live locally. He reported good social support from his family. Hobbies and activities include doing rock steady boxing, yard work, and housework.     Substance Use: The patient denied current use of alcohol, illicit drugs, marijuana, and nicotine products. Per his medical record, he has an 18-pack-year history of cigarette smoking. There is no reported history of substance use disorder or treatment.     Measures Administered: Catarina Naming Test (BNT); Brief Visuospatial Memory Test - Revised (BVMT-R); [place]it[place]-Ernst Executive Functioning System (DKEFS): Color-Word Interference (CWI) & Verbal Fluency (VF); Executive Clock Drawing Test (CLOX); Generalized Anxiety Disorder 7 Item Scale (BRAYAN-7); Lemon Verbal Learning Test - Revised (HVLT-R); Mini-Mental State Examination - 2nd Ed. (MMSE-2); Neuropsychological Assessment Battery (NAB) Form 1: Dots; Parkinson's Disease Questionnaire-39 (PDQ-39); Patient Health Questionnaire (PHQ-9); Test of Practical Judgment (TOPJ); Test of Premorbid Functioning (ToPF); Trail Making Test A & B (TMT); Wechsler Adult Intelligence Scale - 4th Ed. (WAIS-IV): Block Design (BD), Digit Span (DS), Similarities (SI), Information (IN), & Coding (CD); and Wechsler Memory Scale - 4th Ed. Logical Memory (WMS-IV LM). Informant Questionnaires: Activities of  Daily Living Questionnaire (ADLQ) and Neuropsychiatric Inventory Questionnaire (NPI-Q).     Behavioral Observations: The patient arrived at the clinic on time and was accompanied by his wife, who participated in the clinical interview. He was well groomed and dressed. He was alert and oriented to situation, person, and time, but incompletely oriented to place (MMSE-2 Orientation = 9/10; incorrect building name). He was polite and always maintained appropriate interpersonal boundaries. Rapport was easily established. Gait was slow with reduced balance. He exhibited hypomimia and slowed fine motor movements during tasks with a motor component. Expressive and receptive language were intact during conversation. Speech volume was reduced. Speech rate, articulation, and prosody were normal. Speech content was logical and appropriate to context. Mood was euthymic during the appointment. Affect was mood-congruent and appropriate to the situation. Insight into cognitive and emotional functioning was adequate. There was no indication of hallucinations, delusions, or thought disorder during the appointment. Vision and hearing were adequate for the evaluation. He was attentive throughout the evaluation and had no difficulties with retention of task demands. His rate of task execution was slow. He perceived his performance as poor and made negative remarks at times but responded well to encouragement and redirection from the examiner. Overall, he was engaged and cooperative throughout testing.     Results: Please note that scores reported are for professional use only. For diagnostic purposes, a performance score that falls below the 9th percentile of the reference group for that measure may be considered a cognitive deficit depending on the overall pattern of performance. The following clinical descriptors identify performance within the range of percentile scores indicated in the parentheses: Exceptionally High Score (>98th),  Above Average Score (91st-97th), High Average Score (75th-90th), Average Score (25th-74th), Low Average Score (9th-24th), Below Average Score (3rd-8th), and Exceptionally Low Score (<2nd). Please see the test results summary table below for a list of measures administered as well as raw and normative scores, which are listed in the designated columns. All such scores are based on age-corrected norms and certain scores may be adjusted for education and/or other demographic factors as appropriate.    Data Validity: The patient's performance on embedded validity indicators was within the valid range, suggesting he appropriately engaged in testing. The following test results are considered an accurate representation of his current level of cognitive functioning.    Test Results Summary Table:          Self-Report Questionnaires: The patient's responses on self-report inventories of emotional functioning were indicative of mild levels of depression (PHQ-9) and anxiety (BRAYAN-7) over the past two weeks.    Informant Questionnaires: His wife completed a questionnaire about the patient's ability to function independently, implying a moderate level of impairment in activities of daily living, with particular difficulty in the areas of (ADLQ). On a questionnaire regarding neuropsychiatric symptoms, his wife reported observing depression, anxiety, irritability, and nighttime behaviors (NPI-Q).      Thank you for allowing me to participate in the patient's care. If I can be of further assistance, please do not hesitate to contact me.    Juan Manuel Zavala, Ph.D.          Clinical Neuropsychologist          Department of Neurology          [facility]              This report was created using voice recognition software. I have made every reasonable attempt to avoid dictation errors, but this document may contain an error not identified before finalizing. If the error changes the accuracy of the document, I would appreciate it being  brought to my attention. Thank you.    Three hours and 39 minutes were spent in clinical decision-making, chart review, records reviews, interpretation of test results and clinical data, integration of patient data, interactive feedback to the patient, and report preparation (test evaluation services: 63385 = 1, 79185 = 3).

## 2024-11-01 ENCOUNTER — APPOINTMENT (OUTPATIENT)
Dept: SPEECH THERAPY | Facility: OTHER | Age: 79
End: 2024-11-01
Payer: MEDICARE

## 2024-11-01 DIAGNOSIS — R49.0 HYPOKINETIC PARKINSONIAN DYSPHONIA (HCC): ICD-10-CM

## 2024-11-01 DIAGNOSIS — F02.80 PARKINSON'S DISEASE, LEWY BODY (HCC): ICD-10-CM

## 2024-11-01 DIAGNOSIS — G31.83 PARKINSON'S DISEASE, LEWY BODY (HCC): ICD-10-CM

## 2024-11-01 DIAGNOSIS — G20.A1 HYPOKINETIC PARKINSONIAN DYSPHONIA (HCC): ICD-10-CM

## 2024-11-01 PROCEDURE — 92507 TX SP LANG VOICE COMM INDIV: CPT | Mod: GN | Performed by: SPEECH-LANGUAGE PATHOLOGIST

## 2024-11-01 NOTE — OP THERAPY DAILY TREATMENT
Outpatient Speech Therapy  DAILY TREATMENT     [facility] Pathology & Audiology  [address] [address]  Phone:  [phone]  Fax:  [phone]    Date: 11/01/2024    Patient: Augusto Montemayor Jr.  YOB: 1945  MRN: [id number]     Time Calculation    Start time: 1010  Stop time: 1100 Time Calculation (min): 50 minutes         Chief Complaint: Speech Therapy    Visit #: 45    Subjective Evaluation  Augusto arrived to the [facility] clinic 10 minutes late for his session. He was accompanied by his wife, Samantha, and his daughter, Debbi. Samantha reported that he completed 8 home sessions of SPEAK OUT! since Thursday, October, 24th. Augusto stated that he attempted to create a daily planner in the morning, but the tasks became tedious. Since the last visit, Augusto completed a neurological assessment. He stated that the results suggests Lewy Body Dementia.     Speech Therapy Objective   1. Structured conversation: Augusto participated in a structured conversation.He received 3 verbal cues to use intent during the conversation. This is a decrease from 6 verbal cues provided the previous session. When tasked to recall the initial topic, Augusto provided the answer after a semantic cue. This suggests that Augusto is able to hold a conversation, but struggles to remember the initiating topic.     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 His diagnosis of moderate hypokinetic dysarthria is characterized by a weak breathy voice, low volume, and variable speech rate.     Speech Therapy Plan  Complete a Medical Release Form with the client.  Brainstorm stories to practice telling using script training.   Speak with intent.    This was visit 5/40

## 2024-11-04 ENCOUNTER — OFFICE VISIT (OUTPATIENT)
Dept: BEHAVIORAL HEALTH | Facility: CLINIC | Age: 79
End: 2024-11-04
Payer: MEDICARE

## 2024-11-04 ENCOUNTER — SPEECH THERAPY (OUTPATIENT)
Dept: SPEECH THERAPY | Facility: OTHER | Age: 79
End: 2024-11-04
Payer: MEDICARE

## 2024-11-04 DIAGNOSIS — G31.83 PARKINSON'S DISEASE, LEWY BODY (HCC): ICD-10-CM

## 2024-11-04 DIAGNOSIS — F41.1 GENERALIZED ANXIETY DISORDER: ICD-10-CM

## 2024-11-04 DIAGNOSIS — F33.1 MAJOR DEPRESSIVE DISORDER, RECURRENT EPISODE, MODERATE (HCC): ICD-10-CM

## 2024-11-04 DIAGNOSIS — G20.A1 HYPOKINETIC PARKINSONIAN DYSPHONIA (HCC): ICD-10-CM

## 2024-11-04 DIAGNOSIS — R49.0 HYPOKINETIC PARKINSONIAN DYSPHONIA (HCC): ICD-10-CM

## 2024-11-04 DIAGNOSIS — F02.80 PARKINSON'S DISEASE, LEWY BODY (HCC): ICD-10-CM

## 2024-11-04 PROCEDURE — 90792 PSYCH DIAG EVAL W/MED SRVCS: CPT | Performed by: PSYCHIATRY & NEUROLOGY

## 2024-11-04 RX ORDER — FLUOXETINE 10 MG/1
10 CAPSULE ORAL EVERY MORNING
Qty: 30 CAPSULE | Refills: 0 | Status: SHIPPED | OUTPATIENT
Start: 2024-11-04 | End: 2024-12-04

## 2024-11-04 NOTE — PROGRESS NOTES
[facility]   Therapy Progress Note      This provider informed the patient their medical records are totally confidential except for the use by other providers involved in their care, or if the patient signs a release, or to report instances of child or elder abuse, or if it is determined they are an immediate risk to harm themselves or others.    Name: Alex Montemayor Jr.  MRN: [id number]  : 1945  Age: 78 y.o.  Date of assessment: 2024  PCP: MORGAN Garvey      Present Illness:   Chart reviewed prior to seeing him and his wife in my office.  He is 78,  54 years, and has 2 daughters 1 of whom lives with them.  His wife is a retired nurse.  They moved from [place] to [place] 41 years ago.  He is referred for evaluation of depression and anxiety.  He has been depressed intermittently since adolescence.  He had a previous problem with insomnia but is sleeping better now.  Appetite is normal.  He does have problems with energy, short-term memory, and concentration.  He has difficulty motivating himself.  He sees himself as a worrier.  He has some suicidal thoughts but never developed a plan or made an attempt.    He has been diagnosed as having Lewy body dementia and is seeing a neurologist.    Past Psych History:   He previously saw Dr. Carr, previous director of psychiatric services here.  Additional information will need to be obtained.    Substance Abuse History:  No alcohol or substance abuse.  He drinks a half of a beer 6 times per year.    Family History:   His parents are .  Both of his 2 sisters have had problems with depression.  Apparently his mother was also depressed and was thought to have ADD.  His brother is .  His wife has had problems with depression.    Medical History:  Obstructive sleep apnea, pacemaker, GERD, possible TIA, decreased hearing, postural hypotension.  He and his wife both had COVID in 2024.    Psych  Medications:  He has been on Prozac for 2 years currently taking 20 mg a.m.  He would like to increase to 30 mg a.m.  Ambien 5 mg at bedtime has been discontinued.  He was previously on BuSpar and Effexor    Allergies: Serzone, ibuprofen, nonsteroidal anti-inflammatories, Lexapro    Mental Status:   He is alert, oriented, and cooperative.  Relatedness is good.  Grooming is good.  Very soft-spoken.  Decreased hearing.  Speech is normal rate.  Memory is fairly good.  Insight and judgment are fairly good.  No indication of psychotic thinking.    Current Risk:       Suicidal: Not suicidal       Homicidal: Not homicidal       Self-Harm: No plan to harm self       Relapse (Low/Moderate/High): Moderate       Crisis Safety Plan Reviewed: Discussed with patient    Diagnosis:  Major depressive disorder, recurrent  Generalized anxiety disorder  Lewy body dementia    Treatment Plan:  Current treatment plan consists of monthly psychiatric sessions designed to evaluate his depression and anxiety.     Duration cannot be determined at this time.    Goals: Remission of depression with control of anxiety in order to prevent relapse due to the chronic nature of his behavioral health problems and mental illness.  Increase Prozac to a total of 30 mg a.m.  Possible side effects discussed.      Gregory Belcher M.D.     This note was created using voice recognition software (Dragon). The accuracy of the dictation is limited by the abilities of the software. I have reviewed the note prior to signing, however some errors in grammar and context are still possible. If you have any questions related to this note please do not hesitate to contact our office.

## 2024-11-12 NOTE — OP THERAPY DAILY TREATMENT
"  Outpatient Speech Therapy  DAILY TREATMENT     [facility] Speech Pathology & Audiology  [address] [place] [address]  Phone:  [phone]  Fax:  [phone]    Date: 11/04/2024    Patient: Augusto Montemayor Jr.  YOB: 1945  MRN: [id number]     Time Calculation    Start time: 1000  Stop time: 1055 Time Calculation (min): 55 minutes         Chief Complaint: Speech Therapy    Visit #: 46    Subjective Evaluation  Augusto arrived at the [facility] on time for his session. He was accompanied by his wife, Samantha, and his daughter, Debbi, both remained in the waiting room throughout the session. Augusto stated that his \"memory loss\" (e.g., short-term memory) is causing issues and it seems to be getting worse. He did not know if the family figured out access to his MyChart, and seemed disoriented as observed by him stating he did not remember being in the clinic three days prior to the session. However, he stated that he and his wife have a hearing evaluation to assess their hearing function and to tune their hearing aids in the near future.     Speech Therapy Objective   1. Medical Release Form: On October 1st, 2024, Augusto completed a neurological evaluation assessed by Juan Manuel Koch, Ph.D. In order to access this report, Augusto completed a Medical Release Form so the clinician can access the information to drive therapy and better meet Augusto's needs in therapy. Augusto answered all the questions on the form, although some questions required more time in order for him to recall the information (e.g., his phone number, zip code). This data suggests that if Augusto was in danger (i.e., lost, speaking with an officer, etc.) he would be able to accurately convey personal information to an unfamiliar listener who could assist him in returning to safety.     2. Script Rehearsal: To support Augusto's memory and speaking with intent, he was tasked with brainstorming stories he would like to " "share with his family during Thanksgiving dinner. He shared three stories about his time in college. It was noted that his final story reverted back to a previous story, causing some confusion for the listener. This data suggests that Augusto's long-term memory is a strength, but his working memory negatively impacts his verbal cohesiveness.     3. Speaking with Intent: Throughout the session, Augusto was verbally reminded to \"speak with intent\" eight times. This suggests that he still relies on verbal cues in order to speak at an appropriate volume for conversation.         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 His diagnosis of moderate hypokinetic dysarthria is characterized by a weak breathy voice, low volume, and variable speech rate.     Speech Therapy Plan  Select one of the three stories and refine it for cohesion.   Discuss results of the neurological assessment.     This was visit 6/40          "

## 2024-11-18 ENCOUNTER — SPEECH THERAPY (OUTPATIENT)
Dept: SPEECH THERAPY | Facility: OTHER | Age: 79
End: 2024-11-18
Payer: MEDICARE

## 2024-11-18 DIAGNOSIS — G20.A1 HYPOKINETIC PARKINSONIAN DYSPHONIA (HCC): ICD-10-CM

## 2024-11-18 DIAGNOSIS — R49.0 HYPOKINETIC PARKINSONIAN DYSPHONIA (HCC): ICD-10-CM

## 2024-11-18 DIAGNOSIS — G31.83 PARKINSON'S DISEASE, LEWY BODY (HCC): ICD-10-CM

## 2024-11-18 DIAGNOSIS — F02.80 PARKINSON'S DISEASE, LEWY BODY (HCC): ICD-10-CM

## 2024-11-18 NOTE — OP THERAPY DAILY TREATMENT
"  Outpatient Speech Therapy  DAILY TREATMENT     [facility] Speech Pathology & Audiology  [address] [place] [address]  Phone:  [phone]  Fax:  [phone]    Date: 11/18/2024    Patient: Augusto Montemayor Jr.  YOB: 1945  MRN: [id number]     Time Calculation    Start time: 1000  Stop time: 1055 Time Calculation (min): 55 minutes         Chief Complaint: Speech Therapy    Visit #: 47    Subjective Evaluation  Augusto arrived to the [facility] on time. He was accompanied by his wife, Samantha, who remained in the waiting room during his session. Augusto brought a memory book containing photos of his family throughout the years and he shared it with the clinician. He noted that his short-term memory is getting worse and it is regressing faster than he anticipated.     Speech Therapy Objective   1. Timed Planner: In order to support Augusto's short-term memory and organization of daily tasks, he was tasked to refer to an itinerary upon the sound of a timed alarm to direct his next task for the session. Throughout the session, Augusto relied on clinician reminders to refer to the itinerary. This data suggests that the use of an alarm and a daily planner would be beneficial, but Augusto would benefit from continued practice.     2. Biofeedback of Intent: Augusto listened to a recording of a story he shared from the previous session and was tasked with rating his level of intent. When scoring himself he stated, \"Not so good,\" for his use of intent while sharing his story. This suggests that Augusto benefits from biofeedback to support his use of intent, as well as a reminder of the importance of using intent while conversing.    3. Script Training: Augusto was tasked with refining a script with the intention of sharing it with his family on Thanksgiving. With clinician guidance, he clarified key elements of the story, added description, and edited it for cohesion. This data suggest that " Augusto's long-term memory is a strength.     4. Cues for intent: Throughout the session, Augusto was cued to use intent during conversation 11 times. 4 of those time were visual cues (e.g., mimicking throwing his voice at the wall), and 8 cues were verbal cues (e.g., use intent). This suggests that Augusto still relies on cues to use intent while he speaks in order to achieve an appropriate conversational volume.     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 His diagnosis of moderate hypokinetic dysarthria is characterized by a weak breathy voice, low volume, and variable speech rate.     Speech Therapy Plan  Continue Script Training using Intent  Administer the CLQT for updated baseline measurements    This was visit 7/40

## 2024-12-09 ENCOUNTER — OFFICE VISIT (OUTPATIENT)
Dept: BEHAVIORAL HEALTH | Facility: CLINIC | Age: 79
End: 2024-12-09
Payer: MEDICARE

## 2024-12-09 DIAGNOSIS — G31.83 MILD LEWY BODY DEMENTIA WITH PSYCHOTIC DISTURBANCE (HCC): ICD-10-CM

## 2024-12-09 DIAGNOSIS — F02.A2 MILD LEWY BODY DEMENTIA WITH PSYCHOTIC DISTURBANCE (HCC): ICD-10-CM

## 2024-12-09 DIAGNOSIS — F33.1 MAJOR DEPRESSIVE DISORDER, RECURRENT EPISODE, MODERATE (HCC): ICD-10-CM

## 2024-12-09 DIAGNOSIS — F41.1 GENERALIZED ANXIETY DISORDER: ICD-10-CM

## 2024-12-09 PROCEDURE — 99214 OFFICE O/P EST MOD 30 MIN: CPT | Performed by: PSYCHIATRY & NEUROLOGY

## 2024-12-09 RX ORDER — FLUOXETINE 40 MG/1
40 CAPSULE ORAL DAILY
Qty: 30 CAPSULE | Refills: 0 | Status: SHIPPED | OUTPATIENT
Start: 2024-12-09 | End: 2025-01-08

## 2024-12-09 NOTE — PROGRESS NOTES
[facility]   Follow Up Assessment     This provider informed the patient their medical records are totally confidential except for the use by other providers involved in their care, or if the patient signs a release, or to report instances of child or elder abuse, or if it is determined they are an immediate risk to harm themselves or others.    Name: Alex Montemayor Jr.  MRN: [id number]  : 1945  Age: 79 y.o.  Date of assessment: 2024  PCP: MORGAN Garvey      Subjective:  Chart reviewed prior to seeing him and his wife,  a retired nurse,  in my office.  He was last seen on .  He is seeing a neurologist for treatment of Lewy body dementia.  He is currently on Prozac 30 mg a.m. but thus far it is not helping him very much.  He is agreeable to increasing Prozac to 40 mg a.m.  He was previously on Wellbutrin and Effexor.     Objective:  He is alert, oriented, and cooperative.  Very soft-spoken.  Relatedness is good.  Grooming is good.  Speech is normal rate.  Anxious.  Memory is fairly good.  Insight and judgment are fairly good.  No indication of psychotic thinking.    Current Risk:       Suicidal: Not suicidal       Homicidal: Not homicidal       Self-Harm: No plan to harm self       Relapse: (Low/Moderate/High): Moderate       Crisis Safety Plan Reviewed: Discussed with patient    Diagnosis:   Major depressive disorder, recurrent  Generalized anxiety disorder  Lewy body dementia    Treatment Plan:  The current treatment plan consists of monthly psychiatric sessions designed to evaluate his depression and anxiety.    Duration will be for a minimum of 12 months and will be reviewed at each visit.    Goals: Remission of depression with control of anxiety in order to prevent relapse due to the chronic nature of his behavioral health problems and mental illness.  Increase Prozac to 40 mg a.m.        Gregory Belcher M.D.      This note was created using voice recognition  software (Dragon). The accuracy of the dictation is limited by the abilities of the software. I have reviewed the note prior to signing, however some errors in grammar and context are still possible. If you have any questions related to this note please do not hesitate to contact our office.

## 2024-12-27 ENCOUNTER — DOCUMENTATION (OUTPATIENT)
Dept: NEUROLOGY | Facility: MEDICAL CENTER | Age: 79
End: 2024-12-27
Payer: MEDICARE

## 2024-12-28 NOTE — PROGRESS NOTES
Just spoke with Samantha Montemayor (wife) for 15 to 20 minutes.    She had concern about increasing drowsiness and tendency for Augusto to want to sleep more in the last 2-3 weeks or so.  She was also noticing a gradual change in his memory being more noticeable to her in the last 1-2 months with periods of reduced word fluency seeming to occur more than before.    She did not endorse that Augusto was or had been rapidly becoming confused or having increasing behavioral,irritability,mood fluctuations or psychotic symptoms in the last week or 2.    He has been eating and maintaining his weight and tending to eat more cookies and candy despite her telling him not to do that.    He has not been having headaches, dysphagia or new involuntary movements in the last week.    He has not been having dysuria, fever(s), chills, difficulty breathing or new balance changes when walking the the recent days and in fact there has been no increasing gait impairment >  he has not been taking for many months the previously suggested TID dosing of Sinemet 25/100 because of the  the apparent difficulty in taking that medication given it's TID shceudule. He has not been taking for over  a few months now though he remains self ambulatory including today and without falls in recent weeks.    Of note- the only medication change in the last 2 months or so was per his psychiatric (Dr Whaley) was to increase Prozac from 20 mg to 30 mg (for 1 month)  and over the last 2 weeks up to 40 mg a day.  He had been taking the Prozac around noon time which when he was eating his breakfast.    Plans:    A. Reduce prozac to 20 mg from the present and recently 40 mg a day dose given the potential for anticholingeric related drowsiness/sleeping and increasing memory effects.  B. Did not feel that Augusto required immediately being taken to the ED or Urgent Care given our conversation just now= the evolution of symptoms has been very  gradual over atleast 1 month.  C. I did  not feel there was immediate need for a head CT at this time (like today) based on our discussion just now.  SOCRATES Singh and Augusto will be meeting again with Dr. Whaley next week to review the medication> I suggested that there are several other SSRIs with less anticholinergic effects that Fluoxetine.  SHOBHA Casas has an appointment with me in Feb 2025 but I told Samantha that if there was actually sudden and dramatic changes in his orientation, communication ability or walking capacity then an expedite evaluation in the Emergency Room was advised.

## 2025-02-03 ENCOUNTER — APPOINTMENT (OUTPATIENT)
Dept: BEHAVIORAL HEALTH | Facility: CLINIC | Age: 80
End: 2025-02-03
Payer: MEDICARE

## 2025-02-03 DIAGNOSIS — F33.1 MAJOR DEPRESSIVE DISORDER, RECURRENT EPISODE, MODERATE (HCC): ICD-10-CM

## 2025-02-03 PROCEDURE — 99214 OFFICE O/P EST MOD 30 MIN: CPT | Performed by: PSYCHIATRY & NEUROLOGY

## 2025-02-03 NOTE — PROGRESS NOTES
[facility]   Follow Up Assessment     This provider informed the patient their medical records are totally confidential except for the use by other providers involved in their care, or if the patient signs a release, or to report instances of child or elder abuse, or if it is determined they are an immediate risk to harm themselves or others.    Name: Alex Montemayor Jr.  MRN: [id number]  : 1945  Age: 79 y.o.  Date of assessment: 2/3/2025  PCP: MORGAN Garvey      Subjective:  Chart reviewed prior to seeing him and his wife in my office.  They were seen most recently on .  His wife decided to decrease his Prozac to 20 mg a.m. again from 40 mg a.m. because it was not helping his depression. They prefer to not try a different antidepressant because the they will be consulting a cardiologist on  for his mitral valve leakage.  He and his wife will decide what they want to do about possible cardiac surgery.  They prefer not to schedule a follow-up appointment at this time but would like to continue Prozac 20 mg a.m. for now.  He has consulted a neurologist about his Lewy body dementia.  He has had psychological testing here and they would like to have copies before the cardiology consult on .  The  will assist them in obtaining copies of the psychological testing.    Objective:  He is alert, oriented, and cooperative.  Relatedness is fairly good.  Grooming is good.  Speech is normal rate.  Anxious.  Memory is fairly good.  Insight and judgment are fairly good.  No indication of psychotic thinking.    Current Risk:       Suicidal: Not suicidal       Homicidal: Not homicidal       Self-Harm: No plan to harm self       Relapse: (Low/Moderate/High): Moderate       Crisis Safety Plan Reviewed: Discussed with patient    Diagnosis:   Major depressive disorder, recurrent    Treatment Plan:  See subjective.  He and his wife prefer to continue Prozac 20 mg  rohini for now.    Gregory Belcher M.D.      This note was created using voice recognition software (Dragon). The accuracy of the dictation is limited by the abilities of the software. I have reviewed the note prior to signing, however some errors in grammar and context are still possible. If you have any questions related to this note please do not hesitate to contact our office.

## 2025-02-10 ENCOUNTER — APPOINTMENT (OUTPATIENT)
Dept: BEHAVIORAL HEALTH | Facility: CLINIC | Age: 80
End: 2025-02-10
Payer: MEDICARE

## 2025-02-20 ENCOUNTER — OFFICE VISIT (OUTPATIENT)
Dept: NEUROLOGY | Facility: MEDICAL CENTER | Age: 80
End: 2025-02-20
Attending: PSYCHIATRY & NEUROLOGY
Payer: MEDICARE

## 2025-02-20 VITALS
WEIGHT: 136.24 LBS | SYSTOLIC BLOOD PRESSURE: 98 MMHG | BODY MASS INDEX: 22.7 KG/M2 | TEMPERATURE: 97.6 F | HEIGHT: 65 IN | OXYGEN SATURATION: 97 % | HEART RATE: 71 BPM | DIASTOLIC BLOOD PRESSURE: 56 MMHG

## 2025-02-20 DIAGNOSIS — F02.A0 MILD LEWY BODY DEMENTIA WITHOUT BEHAVIORAL DISTURBANCE, PSYCHOTIC DISTURBANCE, MOOD DISTURBANCE, OR ANXIETY (HCC): Primary | ICD-10-CM

## 2025-02-20 DIAGNOSIS — F33.1 MODERATE RECURRENT MAJOR DEPRESSION (HCC): ICD-10-CM

## 2025-02-20 DIAGNOSIS — G20.C PARKINSONISM, UNSPECIFIED PARKINSONISM TYPE (HCC): ICD-10-CM

## 2025-02-20 DIAGNOSIS — Z98.890 HISTORY OF LEFT-SIDED CAROTID ENDARTERECTOMY: ICD-10-CM

## 2025-02-20 DIAGNOSIS — G47.33 OSA (OBSTRUCTIVE SLEEP APNEA): ICD-10-CM

## 2025-02-20 DIAGNOSIS — G31.83 MILD LEWY BODY DEMENTIA WITHOUT BEHAVIORAL DISTURBANCE, PSYCHOTIC DISTURBANCE, MOOD DISTURBANCE, OR ANXIETY (HCC): Primary | ICD-10-CM

## 2025-02-20 PROBLEM — F03.90: Status: RESOLVED | Noted: 2021-05-28 | Resolved: 2025-02-20

## 2025-02-20 PROBLEM — M51.369 DEGENERATION OF LUMBAR INTERVERTEBRAL DISC: Status: ACTIVE | Noted: 2023-06-20

## 2025-02-20 PROBLEM — G30.9 ALZHEIMER'S DISEASE, UNSPECIFIED (CODE) (HCC): Status: RESOLVED | Noted: 2024-08-19 | Resolved: 2025-02-20

## 2025-02-20 PROCEDURE — 3074F SYST BP LT 130 MM HG: CPT | Performed by: PSYCHIATRY & NEUROLOGY

## 2025-02-20 PROCEDURE — 99215 OFFICE O/P EST HI 40 MIN: CPT | Performed by: PSYCHIATRY & NEUROLOGY

## 2025-02-20 PROCEDURE — 3078F DIAST BP <80 MM HG: CPT | Performed by: PSYCHIATRY & NEUROLOGY

## 2025-02-20 PROCEDURE — 99212 OFFICE O/P EST SF 10 MIN: CPT | Performed by: PSYCHIATRY & NEUROLOGY

## 2025-02-20 RX ORDER — PANTOPRAZOLE SODIUM 40 MG/1
20 TABLET, DELAYED RELEASE ORAL DAILY
COMMUNITY
Start: 2025-01-22

## 2025-02-20 RX ORDER — FLUOXETINE 10 MG/1
1 CAPSULE ORAL DAILY
COMMUNITY

## 2025-02-20 RX ORDER — FAMOTIDINE 40 MG/1
1 TABLET, FILM COATED ORAL
COMMUNITY

## 2025-02-20 ASSESSMENT — MONTREAL COGNITIVE ASSESSMENT (MOCA)
7. [VIGILENCE] TAP WHEN HEARING DESIGNATED LETTER: 1/1
DELAYED RECALL SUBSCORE: 2/5
2. COPY DRAWING: 0/1
WHAT IS THE TOTAL SCORE (OUT OF 30): 19
6. READ LIST OF DIGITS [FORWARD/BACKWARD]: 2/2
WHAT IS THE VERSION OF MOCA ADMINISTERED: 7.1
ORIENTATION SUBSCORE: 5/6
8. SERIAL SUBTRACTION OF 7S: 4 OR 5/5
3. DRAW A CLOCK: CONTOUR, NUMBERS, HANDS: 1/3
11. FOR EACH PAIR OF WORDS, WHAT CATEGORY DO THEY BELONG TO (OUT OF 2): 2/2
9. REPEAT EACH SENTENCE: 0/2
5. MEMORY TRIALS: SECOND TRIAL
1. ALTERNATING TRAIL MAKING: 0/1
4. NAME EACH OF THE THREE ANIMALS SHOWN: 3/3
10. [FLUENCY] NAME WORDS STARTING WITH DESIGNATED LETTER: 0/1
CATEGORY CUE (IF APPLICABLE): 1
CATEGORY CUE (IF APPLICABLE): 2

## 2025-02-20 ASSESSMENT — PATIENT HEALTH QUESTIONNAIRE - PHQ9
5. POOR APPETITE OR OVEREATING: 2 - MORE THAN HALF THE DAYS
CLINICAL INTERPRETATION OF PHQ2 SCORE: 2
SUM OF ALL RESPONSES TO PHQ QUESTIONS 1-9: 15

## 2025-02-20 NOTE — PROGRESS NOTES
"Neuro follow up:    Reason: Mild Stage/Degree of Dementia with parkinsonism> Lewy Body Disease.    Extensive Neuropsychological evaluation done by my colleague Dr. Juan Manuel Zavala PhD at [facility] in the last 3-4 months> overall findings supportive of Lewy Body Disease vs Parkinson's Disease.      Problem List Reviewed.    Last seen by Tran in December 2022 at [facility].     He is here with Samantha and worked as a capable  - retired in 2009.     Last with me in 11/2023 and here with his wife today (DPOA)     Problem List reviewed:     Historically about  4 to 5 years or so go when he started to have problems with his remote control (like checking the DVR)- he would hit the wrong buttons and then frustrated. There was also some \"short term memory loss\"> wife would talk about going somewhere and he would forget the conversation with 5 minutes and with slight forgetfulness over the last 2-3 years or so.        The wife  has noticed he has problems with dates and times for over 2 years.     2 or 3 times a week he may see a formed person or animal or \"is something moving throughout the yard\"    The wife does  not endorse any notable cognitive/behavioral fluctuations in the last 12 months.     He has for the last 5-6 years or so wife has noticed he body his kick or jump which can last 1-2 hours and he can infrequently snore and he uses a dental device and he tried a CPAP machine (which drove him insane due to the mask).     Voice has clearly gotten softer in the last 12 months and infrequently is so soft to be described as \"mumbling\" for minutes at a time.     He has also been drooling of out of the right side of his mouth in the last 6 months.     He has  had no falls or near falls in the last 6 months.    He has not had any dysphagia in the last 3 to 6 months.    No delusions,paranoia or  behavioral or personality changes in the last 3 months.    Sleep: averages 7-8 hours most nights of the week in the recent " "months. Denies REM Sleep Behavioral type symptoms such as vivid dreaming.  Rare awakenings to urinate (at most 2 times per night in the recent few months). Denies Excessive Day Time Sleepiness or need for daily or frequent napping in the recent few months.       Doub  denies lack or loss of appetite in the recent months.     Augusto  used to have a left hand rest tremor and then moved to the right hand and handwriting has gotten smaller.         Family Hx: Father- Parkinson's Disease- in mid 50's and  at age 78.      Patient Active Problem List    Diagnosis Date Noted    History of left-sided carotid endarterectomy 2023    Parkinsonism ([facility]) 05/15/2023    Mild neurodegenerative dementia ([facility]) 05/15/2023    Lewy body dementia ([facility]) 2022    Epididymitis 10/14/2021    Major depressive disorder, recurrent episode, moderate ([facility]) 10/02/2020    Generalized anxiety disorder 10/02/2020    Obstructive sleep apnea 2016    Lumbar stenosis 2013    Generalized pain 10/30/2013    Occlusion and stenosis of carotid artery without mention of cerebral infarction 2013    Carotid sinus hypersensitivity 2012    Pacemaker [employer] placed 2012    Syncope and collapse 2012    Palpitations 2012       Past medical history:   Past Medical History:   Diagnosis Date    Anesthesia     became combative with LAST surgery; unable to urinate and ended up  in the hospital    Arrhythmia     Ventricular Tachycardia    Arthritis     all joints    Bronchitis     Cataract 2018    Removed bilat    GERD (gastroesophageal reflux disease)     Heart burn     Heart murmur     Heart valve disease     \"leaky\"    Hiatus hernia syndrome     no repair    High cholesterol     Indigestion     episodic    Major neurocognitive disorder probably due to vascular disease, without behavioral disturbance ([facility]) 2021    KAMI (obstructive sleep apnea)     Uses a dental device    Osteoporosis     " 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    Pain     lumbar spine down L leg/buttock; 5/10    Palpitations 2012    Psychiatric problem     Depression    Seizure ([facility])     last seizure 1980    Stroke ([facility]) [place] [id number]    possible TIA, 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 at SURGERY [facility]    FORAMINOTOMY  2013    Performed by Aleksey Garcia M.D. at [facility] ORS    AR NJX AA&/STRD TFRML EPI LUMBAR/SACRAL 1 LEVEL  10/30/2013    Performed by Demond Sullivan M.D. at [facility]    AR NJX AA&/STRD TFRML EPI LUMBAR/SACRAL EA ADDL  10/30/2013    Performed by Demond Sullivan M.D. at [facility] ORS    CAROTID ENDARTERECTOMY  2013    Performed by Collins Conroy M.D. at SURGERY [facility] ORS    RECOVERY  2012    Performed by SURGERY, [facility] at SURGERY SAME DAY [facility] ORS    OTHER      pacemaker    TONSILLECTOMY           Social history:   Social History     Socioeconomic History    Marital status:      Spouse name: Not on file    Number of children: Not on file    Years of education: Not on file    Highest education level: Not on file   Occupational History    Not on file   Tobacco Use    Smoking status: Former     Current packs/day: 0.00     Average packs/day: 3.0 packs/day for 6.0 years (18.0 ttl pk-yrs)     Types: Cigarettes     Start date: 1961     Quit date: 1967     Years since quittin.1    Smokeless tobacco: Never   Vaping Use    Vaping status: Never Used   Substance and Sexual Activity    Alcohol use:  "Yes     Alcohol/week: 0.0 oz     Comment: RARE 1 every 6 months    Drug use: Never    Sexual activity: Not on file   Other Topics Concern    Not on file   Social History Narrative    Not on file     Social Drivers of Health     Financial Resource Strain: Not on file   Food Insecurity: Not on file   Transportation Needs: Not on file   Physical Activity: Not on file   Stress: Not on file   Social Connections: Not on file   Intimate Partner Violence: Not on file   Housing Stability: Not on file       Family history:   Family History   Problem Relation Age of Onset    Other Mother         wegeners granulomatosis    Psychiatric Illness Mother     Other Father         parkinsons,dementia    Psychiatric Illness Sister          Current medications:   Current Outpatient Medications   Medication    FLUoxetine (PROZAC) 20 MG Cap    Ascorbic Acid (VITAMIN C PO)    MAGNESIUM PO    CALCIUM PO    omeprazole (PRILOSEC) 20 MG delayed-release capsule    zolpidem (AMBIEN) 5 MG Tab    Multiple Vitamin (MULTIVITAMIN PO)    docusate sodium (COLACE) 100 MG CAPS    Probiotic Product (PRO-BIOTIC BLEND PO)    Lutein 20 MG CAPS    carbidopa-levodopa (SINEMET)  MG Tab    famotidine (PEPCID) 40 MG Tab     No current facility-administered medications for this visit.       Medication Allergy:  Allergies   Allergen Reactions    Serzone [Nefazodone Hydrochloride] Anaphylaxis    Ibuprofen Hives     Chest pains if taken long term    Nsaids Hives     Chest pain, if taken long term    Lexapro      Pt reports double vision and confusion           Physical examination:   Vitals:    02/20/25 1248   BP: 98/56   BP Location: Left arm   Patient Position: Sitting   BP Cuff Size: Adult   Pulse: 71   Temp: 36.4 °C (97.6 °F)   TempSrc: Temporal   SpO2: 97%   Weight: 61.8 kg (136 lb 3.9 oz)   Height: 1.651 m (5' 5\")       Normal cephalic atraumatic.  There is full range of movement around the neck in all directions without restrictions or discrete pain " evoked triggers.  No lower extremity edema.      Neurological  Exam:      [place] Cognitive Assessment (MOCA) Version 7.1    Years of Education: 4 years college    TOTAL SCORE:   (to be scanned into the MEDIA section in the E.M.R.)    [place] Cognitive Assessment (MOCA) Version Number: 7.1   VISUOSPACIAL / EXECUTIVE   Clock Drawin/3 (see media section for specifics)  Spatial Drawin/1 (see media section)  Cube Drawin/1    NAMING  Naming: 3/3    MEMORY  Memory: Second trial    ATTENTION  Digits: 2/2  Letters: 1/1  Subtraction: 4 or 5/5    LANGUAGE  Repeat Phrases: 0/2 (this was very hard for Augusto to do with multiple additional words stated that I did not state)  Fluency: 0/1 (stated 7 words in 1 minute that begin with an F)    ABSTRACTION  Abstraction: 2/2    DELAYED RECALL  Recall words: 2/5  Category Cue (if applicable): 2 (stated velvet and red)  Multiple Choice Cue (if applicable):1 (stated scar)    ORIENTATION  Orientation: 5/6 (thought it was the 19th; it was the 20th)    Add 1 point if less than or equal to 12 yr education level:     MOCA TOTAL SCORE:    Biomechanical/Visual Limitations (if applicable):            Mental status: Awake, alert and fully oriented to person, place, time, and situation. Normal attention and concentration.  Did not appear/act combative,irritable,anxious,paranoid/delusional or aggressive to or with me.    Speech and language: Speech is fluent without errors, clear, intact to repetition, and intact to naming.     Follows 3 step motor commands in sequence without significant delay and correctly.    Cranial nerve exam:  II: Pupils are equally round and reactive to light. Visual fields are intact by confrontation.  III, IV, VI: EOMI, no diplopia, no ptosis.  V: Sensation to light touch is normal over V1-3 distributions bilaterally.  .  VII: Facial movements are symmetrical. There is no facial droop. .  VIII: Hearing intact to soft speech and finger rub  bilaterally  IX: Palate elevates symmetrically, uvula is midline. Dysarthria is not present.  XI: Shoulder shrug are symmetrical and strong.   XII: Tongue protrudes midline.      Motor exam:    There is mild slowing of the hands to finger tapping but more so with opening and closing his hands.    Cogwheeling bilaterally.    Finger to nose testing is slow (bilaterally) and slower on the left.    Foot tapping bilaterally is slow.    Muscle strength:    Neck Flexors/Extensors: 5/5       Right  Left  Deltoid   5/5  5/5      Biceps   5/5  5/5  Triceps             5/5  5/5   Wrist extensors 5/5  5/5  Wrist flexors  5/5  5/5     5/5  5/5  Interossei  5/5  5/5  Thenar (APB)  5/5  5/5   Hip flexors  5/5  5/5  Quadriceps  5/5  5/5    Hamstrings  5/5  5/5  Dorsiflexors  5/5  5/5  Plantarflexors  5/5  5/5  Toe extension  5/5  5/5      Reflexes:       Right  Left  Biceps   2/4  2/4  Triceps              2/4  2/4  Brachioradialis 2/4  2/4  Knee jerk  2/4  2/4  Ankle jerk  2/4  2/4     Frontal release signs are absent    bilaterally toes are downgoing to plantar stimulation..    Coordination (finger-to-nose, heel/knee/shin, rapid alternating movements) was normal.     There was no ataxia, no tremors, and no dysmetria.     Station and gait : easily stands up from exam chair without retropulsion,veering,leaning,swaying (to either side).     Short steps with en block turning.    Negative Pull Sign.      Labs and Tests and impression/Plans/Recommendations:        Narrative & Impression     1/13/2022 12:13 PM        HISTORY/REASON FOR EXAM: Atypical Parkinson's versus essential tremor  Bilateral hand tremors and memory loss.     TECHNIQUE/EXAM DESCRIPTION AND NUMBER OF VIEWS:  DaTscan? Ioflupane I123        COMPARISON: None.     PROCEDURE:     Oral administration of Lugol solution to block thyroid uptake.     Under standardized conditions, 3.96 MCI of Ioflupane I123 was injected IV.     3 hour after the injection, SPECT imaging of  "the head were obtained.     FINDINGS:  There is asymmetric slightly reduced uptake in the bilateral caudates, left more than right.     Significantly reduced/near absent uptake in the bilateral putamens        IMPRESSION:        Abnormal pattern of uptake in the striata is suggestive of a Parkinsonian type syndrome.             Lewy Body Disease with Parkinsonism- onset 3 years ago with associated cognitive/memory disturbance.     Abnormal ERIC Scan in 1/2022.     Differential includes Parkinson's Dz vs Lewy Body and overall pattern based on supportive Neuropsychological testing supports Lewy Body Disease.     The clinical issues that support Lewy Body Disease including mild visual hallucinations-  \"a large person standing up next to a piece of furniture.\" It seems to be a man but this entity does not talk to him- \"it seems to be a human being wearing a flowing coat> it is more of a body in motion.\"  This single entity does not threaten him. He also sees animals in the back yard> eagles, cats, polar bears (1 large one and 2 little ones).    There is also drooling a few times a day in the last year or so.     There has also been  REM Sleep Behavioral symptoms and he has started a new dental device for about 3 months and there seems to be improvement in his overall sleep habits and wife has not noticed any total body movements.     However he has no history of cognitive fluctuations per wife in the last 3-6 months.     2. Early to Mild stage of a  Dementia- related to #1> based on information provided by wife and verified on the Global Deterioration Score today (18 today per wife)> wife gave him 3 (2's) and multiple 2(s) today.     MOCA score today of 19 /30 today. (See Media Section)     Functional Activities Questionnaire of 19 today per wife- See Media Section.     We re discussed the consideration of Dozepezil (10 mg a day) vs other cognitive medications like Namenda and he is in agreement to not use them at this " "time or point.     3. Carotid Endarctectomy- Left Side (about 6 years ago)        4. O.S.A- chronic and had been using CPAP for 2 years and then more recently used a dental device for nearly 6 years and due to fitting issues has had a new oral device made and is use.     5. Moderate Severity Depression: Prozac 20 mg a day> prescribed by PCP.  PHQ 9 of 15 today     Plans:        A . We re discussed options for dopamine usage and after our discussion today; Augusto would like to try to slowly increase the dose of Sinemet up to 1 tablets TID (30-60 minutes for meals). hold off on Dopamine medications at this time- we reviewed Sinemet today and it's pros and cons and at this time the decision was not to start such medication(s).    Wife has concern about the multiple days of dosing of using oral medications so we reviewed some other options including Rotigatine (2 mg/24 hr) to start and in the future the daily dose (or amount per patch) could be increased.      B.  Cardiac Pacemaker in place and cardiac follow up visit to be done in the recent months.    C.  He tried 5 mg of Donepezil x  about 2.5 months> likely had joint pain(s) from this medication so it's since been stopped.  Wife nor Augusto want to retry Donepezil or another cognitive \"enhancing\" medication at this time like Exelon or Memantine.     D. Caregiver issues reviewed today with wife.    E. Speech and P.T. expected to be continued.        Time today for this visit was 40 minutes to review the above issues.    Follow up with me in about 6-8 months or so.                                      "

## 2025-02-20 NOTE — ADDENDUM NOTE
Patient:   KASI HYMAN JR            MRN: [id number]            FIN: [id number]              Age:   70 years     Sex:  MALE     :  49   Associated Diagnoses:   Dyspnea; Diarrhea; Dizziness   Author:   JOSHUA GO     Basic Information   Time seen: Date & time 20 18:35:00.   History source: Patient.   Arrival mode: Private vehicle.   History limitation: None.     History of Present Illness   The patient presents with difficulty breathing.  The onset was 2  weeks ago.  70-year-old male past medical history of MI, CABG with pacemaker placement 6 years ago, COPD, CHF, HTN presenting to ED with shortness of breath x2 weeks.  Shortness of breath worse on exertion.  States that it feels kind of similar to previous COPD exacerbations.  Patient additionally notes diarrhea x3 weeks.  States he believes he saw small amount of blood in  diarrhea.  Denies polyps.  Denies recent travel or antibiotic use.  Denies chest pain, palpitations, diaphoresis, abdominal pain, nausea, vomiting, fevers, chills..       Review of Systems   Constitutional symptoms:  No fever, no chills, no weakness.    Skin symptoms:  No rash, no lesion.    Eye symptoms:  Vision unchanged, No pain,    ENMT symptoms:  No ear pain, no sore throat, no nasal congestion.   Respiratory symptoms:  Shortness of breath, no cough, no wheezing.   Cardiovascular symptoms:  No chest pain, no palpitations, no diaphoresis.   Gastrointestinal symptoms:  Diarrhea, no abdominal pain, no nausea, no vomiting.   Genitourinary symptoms:  No dysuria, no hematuria, no discharge.   Musculoskeletal symptoms:  No back pain, no Muscle pain, no Joint pain.   Neurologic symptoms:  Dizziness, no headache, no altered level of consciousness.      Health Status   Allergies:    Allergic Reactions (Selected)  NKA.   Medications:  (Selected)   Prescriptions  Prescribed  spironolactone oral 25 mg tablet: 25 mg = 1 tab, Oral, Daily, Tab, # 30 tab, 0 Refills,  Addended by: JENNY ARMENDARIZ on: 2/20/2025 02:25 PM     Modules accepted: Orders     Maintenance, Pharmacy: [id number]  Documented Medications  Documented  Anoro Ellipta inhaler oral 62.5-25 mcg/puff powder: = 1 puff, Inhaled, Daily, Maintenance  Entresto oral  mg tablet: = 1 tab, Oral, BID, Maintenance  Farxiga 10 mg oral tablet: 10 mg = 1 tab, Oral, Daily, Maintenance  Metoprolol Succinate ER 25 mg oral tablet, extended release: 37.5 mg = 1.5 tab, Oral, Daily, Tab ER, Maintenance  albuterol HFA inhaler oral 90 mcg/puff: = 2 puff, Inhaled, Q4H, PRN for wheezing, Aerosol, Maintenance  aspirin oral 81 mg chewable tablet: 81 mg = 1 tab, Oral, Daily, Tab Chew, Maintenance  atorvastatin oral 20 mg tablet: 20 mg = 1 tab, Oral, Daily, Tab, Maintenance  raNITIdine 150 mg oral tablet: 150 mg = 1 tab, Oral, BID, Tab, Maintenance  tamsulosin oral 0.4 mg capsule: 0.4 mg = 1 cap, Oral, Daily, Cap, Maintenance  torsemide oral 20 mg tablet (Demadex): 20 mg = 1 tab, Oral, BID, Tab, Maintenance  triamcinolone topical 0.1% cream: = 1 application, Topical, BID, Cream, Maintenance  triamcinolone topical 0.1% cream: = 1 application, Topical, TID, Cream, Maintenance  varenicline oral 0.5 mg tablet: 0.5 mg = 1 tab, Oral, BID, after meals, Tab, Maintenance.     Past Medical/ Family/ Social History   Medical history:    All Problems (Selected)  CABG (Coronary artery bypass grafting) planned / [id number] / Confirmed  COPD (chronic obstructive pulmonary disease) / [id number] / Confirmed  GERD (gastroesophageal reflux disease) / [id number] / Confirmed  H/o CHF / [id number] / Confirmed  Hepatitis C / [id number] / Confirmed  HTN (hypertension) / [id number] / Confirmed.   Surgical history:    Hernia repair ([id number]).  bypass surgery.  stent placement.  colonoscopy..     Physical Examination              Vital Signs   Vital Sign   09/14/20 18:02 CDT Heart/Pulse Rate 72 beats/min  Normal    SpO2 98 %  Normal   09/14/20 18:02 CDT NIBP Systolic 108 mm Hg  Normal    NIBP Diastolic 69 mm Hg  Normal     NIBP MAP Manual Entry 82   09/14/20 18:02 CDT Temperature - VS 37 deg_C  Normal    Temperature Source - VS Oral   .   General:  Alert, no acute distress.    Skin:  Warm, dry, intact.    Head:  Normocephalic.   Neck:  Supple, trachea midline.    Eye:  Pupils are equal, round and reactive to light, extraocular movements are intact, normal conjunctiva.   Cardiovascular:  Regular rate and rhythm, No murmur.    Respiratory:  Lungs are clear to auscultation, respirations are non-labored, breath sounds are equal, Symmetrical chest wall expansion.   Chest wall:  No tenderness, No deformity.    Back:  Nontender, Normal range of motion, Normal alignment, no step-offs.   Musculoskeletal:  Normal ROM, normal strength, no tenderness, no swelling, no deformity, No lower extremity edema noted bilaterally.  No calf tenderness noted bilaterally.  Negative Homans sign bilaterally.  .    Gastrointestinal:  Soft, Nontender, Non distended, Normal bowel sounds.   Neurological:  Alert and oriented to person, place, time, and situation, normal sensory observed, normal motor observed, normal speech observed, normal coordination observed, 5/5 strength in bilateral upper and lower extremities with intact sensation.  No pronator drift.  Symmetrical smile without facial droop, patient able to raise bilateral eyebrows, patient able to keep bilateral eyes closed against resistance, no slurred speech noted.   Coordination intact with finger-to-nose test and rapid repetitive movements..   Lymphatics:  No lymphadenopathy.   Psychiatric:  Cooperative, appropriate mood & affect.      Medical Decision Making   Differential Diagnosis:  Pneumonia, bronchitis, congestive heart failure, pulmonary embolism, chronic obstructive pulmonary disease, asthma, pulmonary edema, pleural effusion, acute myocardial infarction, upper respiratory infection, influenza, sinusitis, allergies.   Rationale:  70-year-old male past medical history of MI, CABG with pacemaker  placement 6 years ago, COPD, CHF, HTN presenting to ED with shortness of breath x2 weeks.  Shortness of breath worse on exertion.  States that it feels kind of similar to previous COPD exacerbations.  Patient additionally notes diarrhea x3 weeks.  States he believes he saw small amount of blood in diarrhea.  Denies pus in stool.  Denies recent travel or antibiotic use.   Denies chest pain, palpitations, diaphoresis, abdominal pain, nausea, vomiting, fevers, chills, leg swelling, leg pain, prior DVT/PE, hemoptysis.  Upon arrival to ED patient is nontoxic-appearing and in no acute distress.  Vital signs stable.  On physical exam patient has regular rate and rhythm.  No friction rub or murmurs noted.  Lungs clear bilaterally without rhonchi, rales, wheezing, stridor.  No signs of acute respiratory distress.  Patient found to be neurologically intact without signs of CVA.  No extremity edema, JVD, rhonchi, Rales noted that would indicate acute CHF exacerbation.  BNP found to be  2,095 decreased from previous studies. EKG is stable from previous imaging  and shows no acute ST elevations or would indicate STEMI.  Troponin found to be 0.03.  Chest x-ray shows no acute cardiopulmonary abnormalities including fluid overload, pneumonia, pneumothorax.  Given fact the patient has moderate heart score patient admitted for further work-up of shortness of breath.  Abdomen soft, nondistended, nontender aching intra-abdominal abnormality unlikely.  Patient admitted to Dr. Roland for further evaluation of  diarrhea and shortness of breath.  .   Results review:  Lab results : LABORATORY   09/14/20 19:06 CDT Sodium 137 mmol/L    Potassium 4.2 mmol/L    Chloride 106 mmol/L    Carbon Dioxide (CO2) 24 mmol/L    Anion Gap 11 mmol/L    BUN 20 mg/dL    Creatinine 1.14 mg/dL    BUN/Creatinine Ratio 18    GFR ESTIMATE  75    GFR ESTIMATE NON  65    Calcium 9.4 mg/dL    Glucose Level 100 mg/dL  HI    GOT/AST 30  unit/L    GPT/ALT 39 unit/L    Alk Phosphatase 53 unit/L    Bilirubin Total 0.4 mg/dL    Protein, Total 7.3 gm/dL    Albumin 3.5 gm/dL  LOW    Globulin 3.8 gm/dL    A/G Ratio 0.9  LOW    Troponin I 0.03 ng/mL    NT proBNP 2,095 pg/mL  HI    WBC 4.2 THOUSAND/mcL    RBC 5.01 MILLION/mcL    Hemoglobin 13.5 gm/dL    Hematocrit 41.0 %    MCV 81.8 fL    MCH 26.9 pg    MCHC 32.9 gm/dL    RDW-CV 15.9 %  HI    Platelet 161 THOUSAND/mcL    Neutrophils 54 %    Abs Neut 2.3 THOUSAND/mcL    Segs NOT APPLICABLE    Lymph 29 %    Absolute Lymph 1.2 THOUSAND/mcL    Monocytes 14 %    Abs Mono 0.6 THOUSAND/mcL    Eosinophils 2 %    Absolute Eos 0.1 THOUSAND/mcL    Basophils 1 %    Absolute Baso 0.0 THOUSAND/mcL    Analyzer ANC NOT APPLICABLE    Percent Immature Granulocytes 0 %    Absolute Immature Granulocytes 0.0 THOUSAND/mcL    NRBC 0 /100 WBC   .   Radiology results:    Result title:  XR CHEST PORTABLE 1V  Result status:  Final  Verified by:  SEVERINO, RIRI on 09/14/2020 19:20  IMPRESSION:Heart size and pulmonary vessel caliber normal.  Left chest cardiac pacer with leads in RA and RV.  Sternotomy wires and mediastinal staples.  Aortic calcification.  Multiple healed right rib fracture deformities. Slight linear scarring or atelectasis in the left lateral lower lung.  No consolidation.  Skinfold projects over left upper lung..     Impression and Plan   Diagnosis   Dyspnea (EQW85-EX R06.00, Discharge, Medical)   Diarrhea (TVB71-ZZ R19.7, Discharge, Medical)   Dizziness (PNW65-ND R42, Discharge, Medical)   Plan   Condition: Stable.    Disposition: Admit time  09/14/20 21:00:00, Place in Observation Telemetry Unit, SUZANNE MARTINEZ   Counseled: Patient, Regarding diagnosis, Regarding diagnostic results, Regarding treatment plan, Regarding prescription, Patient indicated understanding of instructions.

## 2025-05-30 NOTE — TELEPHONE ENCOUNTER
Received request via: Patient    Was the patient seen in the last year in this department? Yes    Does the patient have an active prescription (recently filled or refills available) for medication(s) requested? No    Pharmacy Name: [facility]    Does the patient have [facility] and need 100-day supply? (This applies to ALL medications) Patient does not have SCP

## 2025-06-09 ENCOUNTER — OFFICE VISIT (OUTPATIENT)
Dept: BEHAVIORAL HEALTH | Facility: CLINIC | Age: 80
End: 2025-06-09
Payer: MEDICARE

## 2025-06-09 DIAGNOSIS — F33.1 MAJOR DEPRESSIVE DISORDER, RECURRENT EPISODE, MODERATE (HCC): Primary | ICD-10-CM

## 2025-06-09 PROCEDURE — 99214 OFFICE O/P EST MOD 30 MIN: CPT | Performed by: PSYCHIATRY & NEUROLOGY

## 2025-06-09 RX ORDER — FLUOXETINE 10 MG/1
10 CAPSULE ORAL DAILY
Qty: 90 CAPSULE | Refills: 0 | Status: SHIPPED | OUTPATIENT
Start: 2025-06-09 | End: 2025-09-07

## 2025-06-09 NOTE — PROGRESS NOTES
[facility]   Follow Up Assessment     This provider informed the patient their medical records are totally confidential except for the use by other providers involved in their care, or if the patient signs a release, or to report instances of child or elder abuse, or if it is determined they are an immediate risk to harm themselves or others.    Name: Alex Montemayor Jr.  MRN: [id number]  : 1945  Age: 79 y.o.  Date of assessment: 2025  PCP: MORGAN Garvey      Subjective:  Chart reviewed prior to seeing him and his wife in my office.  He was last seen on February 3.  He is recovering from pneumonia which he acquired 6 weeks ago.  We reviewed his medications, processes, doses, etc.  He would like to increase Prozac to a total of 30 mg a.m.  Help his recurrent depression.  They would like to have individual and couples psychotherapy scheduled here.    Objective:  He is alert, oriented, and cooperative.  Relatedness is good.  Grooming is good.  He is soft-spoken.  Sad and anxious.  Memory is fair.  Insight and judgment are fairly good.  No indication of psychotic thinking.    Current Risk:       Suicidal: Not suicidal       Homicidal: Not homicidal       Self-Harm: No plan to harm self       Relapse: (Low/Moderate/High): Moderate       Crisis Safety Plan Reviewed: Discussed with patient    Diagnosis:   Major depressive disorder, recurrent  Lewy body dementia    Treatment Plan:  The current treatment plan consists of quarterly psychiatric sessions designed to evaluate his recurrent depression.    Duration will be for a minimum of 12 months and will be reviewed at each visit.    Goals: Remission of depression to prevent relapse due to the chronic nature of his behavioral health problems and mental illness.  Increase Prozac to 10+20 mg equal 30 mg a.m.  Possible side effects discussed.    Gregory Belcher M.D.      This note was created using voice recognition software (Dragon). The  accuracy of the dictation is limited by the abilities of the software. I have reviewed the note prior to signing, however some errors in grammar and context are still possible. If you have any questions related to this note please do not hesitate to contact our office.

## 2025-06-19 ENCOUNTER — HOSPITAL ENCOUNTER (OUTPATIENT)
Dept: LAB | Facility: MEDICAL CENTER | Age: 80
End: 2025-06-19
Attending: PSYCHIATRY & NEUROLOGY
Payer: MEDICARE

## 2025-06-19 ENCOUNTER — OFFICE VISIT (OUTPATIENT)
Dept: NEUROLOGY | Facility: MEDICAL CENTER | Age: 80
End: 2025-06-19
Attending: PSYCHIATRY & NEUROLOGY
Payer: MEDICARE

## 2025-06-19 ENCOUNTER — TELEPHONE (OUTPATIENT)
Dept: PHARMACY | Facility: MEDICAL CENTER | Age: 80
End: 2025-06-19

## 2025-06-19 VITALS
WEIGHT: 130.29 LBS | HEART RATE: 74 BPM | OXYGEN SATURATION: 96 % | SYSTOLIC BLOOD PRESSURE: 104 MMHG | BODY MASS INDEX: 21.71 KG/M2 | DIASTOLIC BLOOD PRESSURE: 60 MMHG | HEIGHT: 65 IN | RESPIRATION RATE: 16 BRPM | TEMPERATURE: 98.1 F

## 2025-06-19 DIAGNOSIS — R44.1 FORMED VISUAL HALLUCINATIONS: ICD-10-CM

## 2025-06-19 DIAGNOSIS — R13.10 DYSPHAGIA, UNSPECIFIED TYPE: ICD-10-CM

## 2025-06-19 DIAGNOSIS — G47.33 OSA (OBSTRUCTIVE SLEEP APNEA): ICD-10-CM

## 2025-06-19 DIAGNOSIS — F02.A0 MILD LEWY BODY DEMENTIA WITHOUT BEHAVIORAL DISTURBANCE, PSYCHOTIC DISTURBANCE, MOOD DISTURBANCE, OR ANXIETY (HCC): Primary | ICD-10-CM

## 2025-06-19 DIAGNOSIS — G20.C PARKINSONISM, UNSPECIFIED PARKINSONISM TYPE (HCC): ICD-10-CM

## 2025-06-19 DIAGNOSIS — F02.A0 MILD LEWY BODY DEMENTIA WITHOUT BEHAVIORAL DISTURBANCE, PSYCHOTIC DISTURBANCE, MOOD DISTURBANCE, OR ANXIETY (HCC): ICD-10-CM

## 2025-06-19 DIAGNOSIS — Z98.890 HISTORY OF LEFT-SIDED CAROTID ENDARTERECTOMY: ICD-10-CM

## 2025-06-19 DIAGNOSIS — G31.83 MILD LEWY BODY DEMENTIA WITHOUT BEHAVIORAL DISTURBANCE, PSYCHOTIC DISTURBANCE, MOOD DISTURBANCE, OR ANXIETY (HCC): Primary | ICD-10-CM

## 2025-06-19 DIAGNOSIS — H35.30 MACULAR DEGENERATION OF BOTH EYES, UNSPECIFIED TYPE: ICD-10-CM

## 2025-06-19 DIAGNOSIS — G31.83 MILD LEWY BODY DEMENTIA WITHOUT BEHAVIORAL DISTURBANCE, PSYCHOTIC DISTURBANCE, MOOD DISTURBANCE, OR ANXIETY (HCC): ICD-10-CM

## 2025-06-19 PROBLEM — N52.9 MALE ERECTILE DYSFUNCTION, UNSPECIFIED: Status: ACTIVE | Noted: 2020-02-02

## 2025-06-19 LAB
ALBUMIN SERPL BCP-MCNC: 4.2 G/DL (ref 3.2–4.9)
ALBUMIN/GLOB SERPL: 1.7 G/DL
ALP SERPL-CCNC: 103 U/L (ref 30–99)
ALT SERPL-CCNC: 30 U/L (ref 2–50)
ANION GAP SERPL CALC-SCNC: 11 MMOL/L (ref 7–16)
AST SERPL-CCNC: 27 U/L (ref 12–45)
BASOPHILS # BLD AUTO: 0.4 % (ref 0–1.8)
BASOPHILS # BLD: 0.03 K/UL (ref 0–0.12)
BILIRUB SERPL-MCNC: 0.4 MG/DL (ref 0.1–1.5)
BUN SERPL-MCNC: 20 MG/DL (ref 8–22)
CALCIUM ALBUM COR SERPL-MCNC: 9.1 MG/DL (ref 8.5–10.5)
CALCIUM SERPL-MCNC: 9.3 MG/DL (ref 8.5–10.5)
CHLORIDE SERPL-SCNC: 100 MMOL/L (ref 96–112)
CO2 SERPL-SCNC: 24 MMOL/L (ref 20–33)
CREAT SERPL-MCNC: 0.67 MG/DL (ref 0.5–1.4)
EOSINOPHIL # BLD AUTO: 0.14 K/UL (ref 0–0.51)
EOSINOPHIL NFR BLD: 1.8 % (ref 0–6.9)
ERYTHROCYTE [DISTWIDTH] IN BLOOD BY AUTOMATED COUNT: 52.3 FL (ref 35.9–50)
FOLATE SERPL-MCNC: 12 NG/ML
GFR SERPLBLD CREATININE-BSD FMLA CKD-EPI: 95 ML/MIN/1.73 M 2
GLOBULIN SER CALC-MCNC: 2.5 G/DL (ref 1.9–3.5)
GLUCOSE SERPL-MCNC: 82 MG/DL (ref 65–99)
HCT VFR BLD AUTO: 39.3 % (ref 42–52)
HGB BLD-MCNC: 13.3 G/DL (ref 14–18)
IMM GRANULOCYTES # BLD AUTO: 0.04 K/UL (ref 0–0.11)
IMM GRANULOCYTES NFR BLD AUTO: 0.5 % (ref 0–0.9)
LYMPHOCYTES # BLD AUTO: 0.79 K/UL (ref 1–4.8)
LYMPHOCYTES NFR BLD: 10.4 % (ref 22–41)
MCH RBC QN AUTO: 29.8 PG (ref 27–33)
MCHC RBC AUTO-ENTMCNC: 33.8 G/DL (ref 32.3–36.5)
MCV RBC AUTO: 88.1 FL (ref 81.4–97.8)
MONOCYTES # BLD AUTO: 0.81 K/UL (ref 0–0.85)
MONOCYTES NFR BLD AUTO: 10.7 % (ref 0–13.4)
NEUTROPHILS # BLD AUTO: 5.78 K/UL (ref 1.82–7.42)
NEUTROPHILS NFR BLD: 76.2 % (ref 44–72)
NRBC # BLD AUTO: 0 K/UL
NRBC BLD-RTO: 0 /100 WBC (ref 0–0.2)
PLATELET # BLD AUTO: 226 K/UL (ref 164–446)
PMV BLD AUTO: 9.4 FL (ref 9–12.9)
POTASSIUM SERPL-SCNC: 4.1 MMOL/L (ref 3.6–5.5)
PROT SERPL-MCNC: 6.7 G/DL (ref 6–8.2)
RBC # BLD AUTO: 4.46 M/UL (ref 4.7–6.1)
SODIUM SERPL-SCNC: 135 MMOL/L (ref 135–145)
TSH SERPL-ACNC: 2.3 UIU/ML (ref 0.38–5.33)
VIT B12 SERPL-MCNC: 469 PG/ML (ref 211–911)
WBC # BLD AUTO: 7.6 K/UL (ref 4.8–10.8)

## 2025-06-19 PROCEDURE — 82607 VITAMIN B-12: CPT

## 2025-06-19 PROCEDURE — 3078F DIAST BP <80 MM HG: CPT | Performed by: PSYCHIATRY & NEUROLOGY

## 2025-06-19 PROCEDURE — 85025 COMPLETE CBC W/AUTO DIFF WBC: CPT

## 2025-06-19 PROCEDURE — 99214 OFFICE O/P EST MOD 30 MIN: CPT | Performed by: PSYCHIATRY & NEUROLOGY

## 2025-06-19 PROCEDURE — 99215 OFFICE O/P EST HI 40 MIN: CPT | Performed by: PSYCHIATRY & NEUROLOGY

## 2025-06-19 PROCEDURE — 84443 ASSAY THYROID STIM HORMONE: CPT | Mod: GA

## 2025-06-19 PROCEDURE — 82746 ASSAY OF FOLIC ACID SERUM: CPT

## 2025-06-19 PROCEDURE — 80053 COMPREHEN METABOLIC PANEL: CPT

## 2025-06-19 PROCEDURE — 36415 COLL VENOUS BLD VENIPUNCTURE: CPT

## 2025-06-19 PROCEDURE — 3074F SYST BP LT 130 MM HG: CPT | Performed by: PSYCHIATRY & NEUROLOGY

## 2025-06-19 PROCEDURE — 84425 ASSAY OF VITAMIN B-1: CPT

## 2025-06-19 RX ORDER — CARBIDOPA AND LEVODOPA 25; 100 MG/1; MG/1
TABLET ORAL
Qty: 60 TABLET | Refills: 4 | Status: SHIPPED | OUTPATIENT
Start: 2025-06-19 | End: 2026-06-17

## 2025-06-19 RX ORDER — METOPROLOL SUCCINATE 25 MG/1
25 TABLET, EXTENDED RELEASE ORAL DAILY
COMMUNITY
Start: 2025-04-09

## 2025-06-19 RX ORDER — ROSUVASTATIN CALCIUM 5 MG/1
5 TABLET, COATED ORAL
COMMUNITY

## 2025-06-19 ASSESSMENT — PATIENT HEALTH QUESTIONNAIRE - PHQ9
5. POOR APPETITE OR OVEREATING: 0 - NOT AT ALL
CLINICAL INTERPRETATION OF PHQ2 SCORE: 6
SUM OF ALL RESPONSES TO PHQ QUESTIONS 1-9: 20

## 2025-06-19 NOTE — PROGRESS NOTES
"Neuro follow up:     Last visit in 2/20/2025.    Reason: Mild Stage/Degree of Dementia with parkinsonism> Lewy Body Disease.     Extensive Neuropsychological evaluation done by my colleague Dr. Juan Manuel Zavala PhD at [facility] in the last 3-4 months> overall findings supportive of Lewy Body Disease vs Parkinson's Disease.      Problem List Reviewed.  Macular Degeneration (Wet)     Last seen by Tran in December 2022 at [facility].     He is here with Samantha and worked as a capable  - retired in 2009. Here  with his wife today (DPOA)       Problem List reviewed:     Historically about  4 to 5 years or so go when he started to have problems with his remote control (like checking the DVR)- he would hit the wrong buttons and then frustrated. There was also some \"short term memory loss\"> wife would talk about going somewhere and he would forget the conversation with 5 minutes and with slight forgetfulness over the last 2-3 years or so.        The wife  has noticed he has problems with dates and times for over 2 years.     2 or 3 times a week he may see a formed person or animal or \"is something moving throughout the yard\"    The wife does  not endorse any notable cognitive/behavioral fluctuations in the last 12 months.     He has for the last 5-6 years or so wife has noticed he body his kick or jump which can last 1-2 hours and he can infrequently snore and he uses a dental device and he tried a CPAP machine (which drove him insane due to the mask).     Voice has clearly gotten softer in the last 12 months and infrequently is so soft to be described as \"mumbling\" for minutes at a time.     He has also been drooling of out of the right side of his mouth in the last 6 months.     He has  had no falls or near falls in the last 6 months.    He has not had any dysphagia in the last 3 to 6 months.     There has no delusions,paranoia or  behavioral or personality changes in the last 3 months.     Sleep: averages 7-8 " hours most nights of the week in the recent months. Denies REM Sleep Behavioral type symptoms such as vivid dreaming.  Rare awakenings to urinate (at most 2 times per night in the recent few months). Denies Excessive Day Time Sleepiness or need for daily or frequent napping in the recent few months.       Doub  denies lack or loss of appetite in the recent months.     Augusto  used to have a left hand rest tremor and then moved to the right hand and handwriting has gotten smaller.         Family Hx: Father- Parkinson's Disease- in mid 50's and  at age 78.         Patient Active Problem List    Diagnosis Date Noted    Moderate recurrent major depression ([facility]) 2025    History of left-sided carotid endarterectomy 2023    Degeneration of lumbar intervertebral disc 2023    Parkinsonism ([facility]) 05/15/2023    Mild neurodegenerative dementia ([facility]) 05/15/2023    Lewy body dementia ([facility]) 2022    Epididymitis 10/14/2021    Major depressive disorder, recurrent episode, moderate ([facility]) 10/02/2020    Generalized anxiety disorder 10/02/2020    Obstructive sleep apnea 2016    Lumbar stenosis 2013    Generalized pain 10/30/2013    Occlusion and stenosis of carotid artery without mention of cerebral infarction 2013    Carotid sinus hypersensitivity 2012    Pacemaker [employer] placed 2012    Syncope and collapse 2012    Palpitations 2012       Past medical history:   Past Medical History[1]    Past surgical history:   Past Surgical History[2]      Social history:   Social History     Socioeconomic History    Marital status:      Spouse name: Not on file    Number of children: Not on file    Years of education: Not on file    Highest education level: Not on file   Occupational History    Not on file   Tobacco Use    Smoking status: Former     Current packs/day: 0.00     Average packs/day: 3.0 packs/day for 6.0 years (18.0 ttl pk-yrs)     Types:  Cigarettes     Start date: 1961     Quit date: 1967     Years since quittin.5    Smokeless tobacco: Never   Vaping Use    Vaping status: Never Used   Substance and Sexual Activity    Alcohol use: Yes     Alcohol/week: 0.0 oz     Comment: RARE 1 every 6 months    Drug use: Never    Sexual activity: Not on file   Other Topics Concern    Not on file   Social History Narrative    Not on file     Social Drivers of Health     Financial Resource Strain: Not on file   Food Insecurity: Not on file   Transportation Needs: Not on file   Physical Activity: Not on file   Stress: Not on file   Social Connections: Not on file   Intimate Partner Violence: Not on file   Housing Stability: Not on file       Family history:   Family History   Problem Relation Age of Onset    Other Mother         wegeners granulomatosis    Psychiatric Illness Mother     Other Father         parkinsons,dementia    Psychiatric Illness Sister          Current medications:   Current Outpatient Medications   Medication    FLUoxetine (PROZAC) 20 MG Cap    FLUoxetine (PROZAC) 10 MG Cap    Ascorbic Acid (VITAMIN C PO)    CALCIUM PO    omeprazole (PRILOSEC) 20 MG delayed-release capsule    docusate sodium (COLACE) 100 MG CAPS    Probiotic Product (PRO-BIOTIC BLEND PO)    Lutein 20 MG CAPS    rotigotine (NEUPRO) 2 MG/24HR patch    pantoprazole (PROTONIX) 40 MG Tablet Delayed Response    famotidine (PEPCID) 40 MG Tab    carbidopa-levodopa (SINEMET)  MG Tab    MAGNESIUM PO    zolpidem (AMBIEN) 5 MG Tab    famotidine (PEPCID) 40 MG Tab    Multiple Vitamin (MULTIVITAMIN PO)     No current facility-administered medications for this visit.       Medication Allergy:  Allergies[3]        Physical examination:   Vitals:    25 1435   BP: 104/60   BP Location: Left arm   Patient Position: Sitting   BP Cuff Size: Adult   Pulse: 74   Resp: 16   Temp: 36.7 °C (98.1 °F)   TempSrc: Temporal   SpO2: 96%   Weight: 59.1 kg (130 lb 4.7 oz)   Height: 1.651 m  "(5' 5\")       Normal cephalic atraumatic.  There is full range of movement around the neck in all directions without restrictions or discrete pain evoked triggers.  No lower extremity edema.      Neurological  Exam:    Augusto had difficulty telling me the president of the [place]- \"Collins Piña\"    He could not tell me the  of the [place].    There is mild psychomotor slowing of general responses.    Mental status: Awake, alert and fully oriented to person, place, time, and situation. Normal attention and concentration.      Did not appear/act combative,irritable,anxious,paranoid/delusional or aggressive to or with me.    Speech and language: Speech is clearly soft but understandable.     Follows 3 step motor commands in sequence without significant delay and correctly.    Face,velvet,Hinduism,scar,red> face,red stated immediately  Second time-  \"radha,jersey,red\"    Cranial nerve exam:  II: Pupils are equally round and reactive to light. Visual fields are intact by confrontation.  III, IV, VI: EOMI, no diplopia, no ptosis.  Snellen Card-  20/40-50 bilaterally with readers on    V: Sensation to light touch is normal over V1-3 distributions bilaterally.  .  VII: Facial movements are symmetrical. There is no facial droop. .  VIII: Hearing intact to soft speech and finger rub bilaterally  IX: Palate elevates symmetrically, uvula is midline. Dysarthria is not present.  XI: Shoulder shrug are symmetrical and strong.   XII: Tongue protrudes midline.      Motor exam:    Cog wheeling of both upper limbs.    Mild reduced opening and closing of the hands.    Reduced foot tapping bilaterally.    No involuntary movements of the limbs or body.      Muscle strength:    Neck Flexors/Extensors: 5/5       Right  Left  Deltoid   5/5  5/5      Biceps   5/5  5/5  Triceps              5/5  5/5   Wrist extensors 5/5  5/5  Wrist flexors  5/5  5/5     5/5  5/5  Interossei  5/5  5/5  Thenar (APB)  5/5  5/5   Hip " flexors  5/5  5/5  Quadriceps  5/5  5/5    Hamstrings  5/5  5/5  Dorsiflexors  5/5  5/5  Plantarflexors  5/5  5/5  Toe extension  5/5  5/5      Sensory exam:    Vibratory: 6-8 seconds at the great toes; 8-10 seconds at the ankles and knees and symmetrical.  Normal proprioception at great toes      Reflexes:       Right  Left  Biceps   2/4  2/4  Triceps              2/4  2/4  Brachioradialis 2/4  2/4  Knee jerk  2/4  2/4  Ankle jerk  2/4  2/4     Frontal release signs are absent    bilaterally toes are downgoing to plantar stimulation..    Coordination (finger-to-nose, heel/knee/shin, rapid alternating movements) was normal.     There was no ataxia, no tremors, and no limb dysmetria.     Station and gait > stooped upper body posture easily stands up from exam chair without retropulsion,veering,leaning,swaying (to either side).     Positive Pull Sign> takes 4 steps backwards.    Has en block turning to both left and right.    Labs and Tests:     NEUROIMAGING:       CT-PET METABOLIC EVALUATION - BRAIN  Order: [id number]   Status: Final result       Next appt: None       Dx: Degenerative disease of nervous syste...    Test Result Released: No (inaccessible [facility])    0 Result Notes  Details    Reading Physician Reading Date Result Priority   Piero Tilley M.D.  [phone] 6/6/2023 Routine     Narrative & Impression     6/6/2023 1:25 PM     HISTORY/REASON FOR EXAM:  evaluate for Lewy Body Disease  Mild narrowing degenerative dementia.     TECHNIQUE/EXAM DESCRIPTION AND NUMBER OF VIEWS:  PET CT Metabolic evaluation of the brain.     Initially, 10.76 mCi F-18 FDG was administered intravenously under standardized conditions. Approximately 30 minutes after FDG administration, the patient was placed in the supine position on the PET CT table for brain imaging. CT images, PET images and   fused CT/PET images were reviewed on a computer workstation. Blood glucose level was 84 mg/dL. The low dose unenhanced CT images were  "used for attenuation correction and anatomic correlation only.     COMPARISON: CT head 1/22/2021     FINDINGS:  Mild decreased activity in the parietal regions bilaterally.  Brain volume: No significant atrophy  Additional findings: None. .     IMPRESSION:     Mild decreased activity in the parietal regions bilaterally, with relative sparing of the occipital lobes, favoring Alzheimer's type dementia over dementia with Lewy bodies.        Exam Ended: 06/06/23  2:23 PM Last Resulted: 06/06/23  2:47 PM               Mid Stage of Lewy Body Disease with Parkinsonism- onset 3 to 4 years ago with associated cognitive/memory disturbance.     Lewy Body Composite Score today per wife of 9 (very consistent with Lewy Body Disease)-  see media section for specifics.    Abnormal ERIC Scan in 1/2022.     He denies any sp difficulties with postural dizziness,faintness or sp syncopal events in the last few weeks.    Augusto clearly has parkinsonian features with softening of voice, evolving postural instability, difficulty with gait initiation and evolving shortening of steps over atleast the last 12 months.     The clinical issues that support Lewy Body Disease including mild visual hallucinations-  \"a large person standing up next to a piece of furniture.\" It seems to be a man but this entity does not talk to him- \"it seems to be a human being wearing a flowing coat> it is more of a body in motion.\"  This single entity does not threaten him. He also sees animals in the back yard> eagles, cats, polar bears (1 large one and 2 little ones).    There is also drooling a few times a day in the last year or so.     There has also been  REM Sleep Behavioral symptoms and he has started a new dental device for about 3 months and there seems to be improvement in his overall sleep habits and wife has not noticed any total body movements.     However he has no history of cognitive fluctuations per wife in the last 3-6 months.     -Mild stage " "of a  Dementia- related to #1> based on information provided by wife and verified on the Global Deterioration Score today (18 today per wife)> wife gave him 3 (2's) and multiple 2(s) today.     I re discussed the consideration of Dozepezil (10 mg a day) vs other cognitive medications like Namenda and he is in agreement to not use them at this time or point.     3. Carotid Endarctectomy- Left Side (about 6 years ago)        4. O.S.A- chronic and had been using CPAP for 2 years and then more recently used a dental device for nearly 6 years and due to fitting issues has had a new oral device made and is use.     5. Moderate Severity Depression: Prozac 30 mg a day>  PHQ 9 of 20  today (Sees Dr. Belcher- Psychiatrist  and saw him last week)     Plans:        A . We re discussed options for dopamine usage and after our discussion today; Augusto would like to try to slowly increase the dose of Sinemet up to 1 tablets TID (30-60 minutes for meals). hold off on Dopamine medications at this time- we reviewed Sinemet today and it's pros and cons and at this time the decision was not to start such medication(s).     Rotigatine (2 mg/24 hr) was prescribed and too extensive.        B.  Cardiac Pacemaker in place and cardiac follow up visit to be done in the recent months.     C.  He tried 5 mg of Donepezil x  about 2.5 months> likely had joint pain(s) from this medication so it's since been stopped. Augusto nor his wife want to retry Donepezil or another cognitive \"enhancing\" medication at this time like Exelon or Memantine.     D. Caregiver issues reviewed today with wife.     E. Speech and P.T. expected to be continued and starting up in July.    F.  SINEMET 25/100 to be started=  1/2 tablet PO TID (30-60 minutes before meals) x 2 weeks, then 1 PO TID.  Goals of Rx to improve walking ability and ability to initiate walking and turning and GI side effects, potential for increased dizziness/lightheaded,faintness (and need to stand up " "slowly from sitting) and visual hallucination(s) increased frequency/severity reviewed at length today.     ( I advised Augusto and wife (to remind Augusto) to remind Augusto to get up very slowly for the 1st 5 days on this medication on the 1/2 tablet and then again on the 1 tablet TID dose)      Time today for this visit was  40  minutes to review the above issues.     Follow up with me in about 6-9 months or so.         [1]   Past Medical History:  Diagnosis Date    Anesthesia     became combative with LAST surgery; unable to urinate and ended up  in the hospital    Arrhythmia     Ventricular Tachycardia    Arthritis     all joints    Bronchitis     Cataract 2018    Removed bilat    GERD (gastroesophageal reflux disease)     Heart burn     Heart murmur     Heart valve disease     \"leaky\"    Hiatus hernia syndrome     no repair    High cholesterol     Indigestion     episodic    Major neurocognitive disorder probably due to vascular disease, without behavioral disturbance (HCC) 05/28/2021    KAMI (obstructive sleep apnea)     Uses a dental device    Osteoporosis     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    Pain     lumbar spine down L leg/buttock; 5/10    Palpitations 02/21/2012    Psychiatric problem     Depression    Seizure ([facility])     last seizure 05/1980    Stroke ([facility]) [place] [id number]    possible TIA, blind in one eye less than a minute duration    Syncope and collapse 02/21/2012    hypersensitive R carotid artery; why pt has a pacermaker    Trouble swallowing 07/22/2021    Unspecified hemorrhagic conditions     brusies easily, fragile skin, bleeds easily   [2]   Past Surgical History:  Procedure Laterality Date    CATARACT EXTRACTION WITH IOL Bilateral 2018    LUMBAR DECOMPRESSION  12/02/2013    Performed by Aleksey Garcia M.D. at SURGERY [facility]    FORAMINOTOMY  12/02/2013    Performed by Aleksey Garcia M.D. at SURGERY [facility] ORS    FL NJX AA&/STRD TFRML EPI LUMBAR/SACRAL 1 LEVEL  10/30/2013    " Performed by Demond Sullivan M.D. at SURGERY [facility] ORS    CT NJX AA&/STRD TFRML EPI LUMBAR/SACRAL EA ADDL  10/30/2013    Performed by Demond Sullivan M.D. at SURGERY [facility] ORS    CAROTID ENDARTERECTOMY  07/24/2013    Performed by Collins Conroy M.D. at SURGERY [facility] ORS    RECOVERY  02/23/2012    Performed by SURGERY, CATH-RECOVERY at [facility]ERY [facility]    OTHER      pacemaker    TONSILLECTOMY     [3]   Allergies  Allergen Reactions    Serzone [Nefazodone Hydrochloride] Anaphylaxis    Ibuprofen Hives     Chest pains if taken long term    Nsaids Hives     Chest pain, if taken long term    Lexapro      Pt reports double vision and confusion

## 2025-06-19 NOTE — TELEPHONE ENCOUNTER
Received Renewal  request via [employer]  for carbidopa-levodopa (SINEMET)  MG Tab. (Quantity:60, Day Supply:30)     Insurance: [employer]  Member ID:  [id number]  BIN: [id number]  PCN: [id number]  Group: [id number]     Ran Test claim via [place] & medication Pays for a $6.23 copay. Will outreach to patient to offer specialty pharmacy services and or release to preferred pharmacy    Will release to pt's preferred pharmacy on file.

## 2025-06-20 ENCOUNTER — DOCUMENTATION (OUTPATIENT)
Dept: NEUROLOGY | Facility: MEDICAL CENTER | Age: 80
End: 2025-06-20
Payer: MEDICARE

## 2025-06-20 DIAGNOSIS — D64.9 LOW HEMATOCRIT: Primary | ICD-10-CM

## 2025-06-20 DIAGNOSIS — R74.8 ELEVATED ALKALINE PHOSPHATASE LEVEL: ICD-10-CM

## 2025-06-23 LAB — VIT B1 BLD-MCNC: 170 NMOL/L (ref 70–180)

## 2025-08-21 ENCOUNTER — APPOINTMENT (OUTPATIENT)
Dept: NEUROLOGY | Facility: MEDICAL CENTER | Age: 80
End: 2025-08-21
Attending: PSYCHIATRY & NEUROLOGY
Payer: MEDICARE

## (undated) DEVICE — SYRINGE 50ML CATHETER TIP (40EA/BX 4BX/CA)

## (undated) DEVICE — SPONGE GAUZE NON-STERILE 4X4 - (2000/CA 10PK/CA)

## (undated) DEVICE — BASIN EMESIS DISP. - (250/CA)

## (undated) DEVICE — SODIUM CHL IRRIGATION 0.9% 1000ML (12EA/CA)